# Patient Record
Sex: FEMALE | Race: BLACK OR AFRICAN AMERICAN | NOT HISPANIC OR LATINO | Employment: UNEMPLOYED | URBAN - METROPOLITAN AREA
[De-identification: names, ages, dates, MRNs, and addresses within clinical notes are randomized per-mention and may not be internally consistent; named-entity substitution may affect disease eponyms.]

---

## 2020-06-12 ENCOUNTER — ANESTHESIA EVENT (OUTPATIENT)
Dept: SURGERY | Facility: CLINIC | Age: 40
End: 2020-06-12
Payer: MEDICAID

## 2020-06-12 ENCOUNTER — APPOINTMENT (OUTPATIENT)
Dept: ULTRASOUND IMAGING | Facility: CLINIC | Age: 40
End: 2020-06-12
Attending: EMERGENCY MEDICINE
Payer: MEDICAID

## 2020-06-12 ENCOUNTER — HOSPITAL ENCOUNTER (OUTPATIENT)
Facility: CLINIC | Age: 40
Discharge: HOME | End: 2020-06-13
Attending: EMERGENCY MEDICINE | Admitting: OBSTETRICS & GYNECOLOGY
Payer: MEDICAID

## 2020-06-12 ENCOUNTER — ANESTHESIA (OUTPATIENT)
Dept: SURGERY | Facility: CLINIC | Age: 40
End: 2020-06-12
Payer: MEDICAID

## 2020-06-12 ENCOUNTER — APPOINTMENT (OUTPATIENT)
Dept: GENERAL RADIOLOGY | Facility: CLINIC | Age: 40
End: 2020-06-12
Attending: EMERGENCY MEDICINE
Payer: MEDICAID

## 2020-06-12 DIAGNOSIS — N83.8 OVARIAN MASS: ICD-10-CM

## 2020-06-12 DIAGNOSIS — Z98.890 POST-OPERATIVE STATE: Primary | ICD-10-CM

## 2020-06-12 DIAGNOSIS — N94.89 ADNEXAL MASS: ICD-10-CM

## 2020-06-12 LAB
ALBUMIN SERPL-MCNC: 3.6 G/DL (ref 3.4–5)
ALBUMIN UR-MCNC: 10 MG/DL
ALP SERPL-CCNC: 56 U/L (ref 40–150)
ALT SERPL W P-5'-P-CCNC: 21 U/L (ref 0–50)
ANION GAP SERPL CALCULATED.3IONS-SCNC: 7 MMOL/L (ref 3–14)
APPEARANCE UR: CLEAR
AST SERPL W P-5'-P-CCNC: 20 U/L (ref 0–45)
B-HCG FREE SERPL-ACNC: <5 IU/L
BASOPHILS # BLD AUTO: 0 10E9/L (ref 0–0.2)
BASOPHILS NFR BLD AUTO: 0.3 %
BILIRUB SERPL-MCNC: 0.6 MG/DL (ref 0.2–1.3)
BILIRUB UR QL STRIP: NEGATIVE
BUN SERPL-MCNC: 8 MG/DL (ref 7–30)
CALCIUM SERPL-MCNC: 8.9 MG/DL (ref 8.5–10.1)
CHLORIDE SERPL-SCNC: 109 MMOL/L (ref 94–109)
CO2 SERPL-SCNC: 25 MMOL/L (ref 20–32)
COLOR UR AUTO: YELLOW
CREAT SERPL-MCNC: 0.76 MG/DL (ref 0.52–1.04)
DIFFERENTIAL METHOD BLD: NORMAL
EOSINOPHIL # BLD AUTO: 0 10E9/L (ref 0–0.7)
EOSINOPHIL NFR BLD AUTO: 0.8 %
ERYTHROCYTE [DISTWIDTH] IN BLOOD BY AUTOMATED COUNT: 14.1 % (ref 10–15)
GFR SERPL CREATININE-BSD FRML MDRD: >90 ML/MIN/{1.73_M2}
GLUCOSE SERPL-MCNC: 83 MG/DL (ref 70–99)
GLUCOSE UR STRIP-MCNC: NEGATIVE MG/DL
HCT VFR BLD AUTO: 36.6 % (ref 35–47)
HGB BLD-MCNC: 12.2 G/DL (ref 11.7–15.7)
HGB UR QL STRIP: ABNORMAL
IMM GRANULOCYTES # BLD: 0 10E9/L (ref 0–0.4)
IMM GRANULOCYTES NFR BLD: 0.3 %
KETONES UR STRIP-MCNC: 10 MG/DL
LEUKOCYTE ESTERASE UR QL STRIP: ABNORMAL
LYMPHOCYTES # BLD AUTO: 1.5 10E9/L (ref 0.8–5.3)
LYMPHOCYTES NFR BLD AUTO: 37.7 %
MCH RBC QN AUTO: 27.1 PG (ref 26.5–33)
MCHC RBC AUTO-ENTMCNC: 33.3 G/DL (ref 31.5–36.5)
MCV RBC AUTO: 81 FL (ref 78–100)
MONOCYTES # BLD AUTO: 0.3 10E9/L (ref 0–1.3)
MONOCYTES NFR BLD AUTO: 7.3 %
MUCOUS THREADS #/AREA URNS LPF: PRESENT /LPF
NEUTROPHILS # BLD AUTO: 2.1 10E9/L (ref 1.6–8.3)
NEUTROPHILS NFR BLD AUTO: 53.6 %
NITRATE UR QL: NEGATIVE
NRBC # BLD AUTO: 0 10*3/UL
NRBC BLD AUTO-RTO: 0 /100
PH UR STRIP: 5 PH (ref 5–7)
PLATELET # BLD AUTO: 260 10E9/L (ref 150–450)
POTASSIUM SERPL-SCNC: 3.6 MMOL/L (ref 3.4–5.3)
PROT SERPL-MCNC: 7.3 G/DL (ref 6.8–8.8)
RBC # BLD AUTO: 4.5 10E12/L (ref 3.8–5.2)
RBC #/AREA URNS AUTO: 76 /HPF (ref 0–2)
SARS-COV-2 PCR COMMENT: NORMAL
SARS-COV-2 RNA SPEC QL NAA+PROBE: NEGATIVE
SARS-COV-2 RNA SPEC QL NAA+PROBE: NORMAL
SODIUM SERPL-SCNC: 141 MMOL/L (ref 133–144)
SOURCE: ABNORMAL
SP GR UR STRIP: 1.02 (ref 1–1.03)
SPECIMEN SOURCE: NORMAL
SPECIMEN SOURCE: NORMAL
SQUAMOUS #/AREA URNS AUTO: 2 /HPF (ref 0–1)
UROBILINOGEN UR STRIP-MCNC: NORMAL MG/DL (ref 0–2)
WBC # BLD AUTO: 4 10E9/L (ref 4–11)
WBC #/AREA URNS AUTO: 5 /HPF (ref 0–5)

## 2020-06-12 PROCEDURE — 74018 RADEX ABDOMEN 1 VIEW: CPT

## 2020-06-12 PROCEDURE — 25000125 ZZHC RX 250: Performed by: NURSE ANESTHETIST, CERTIFIED REGISTERED

## 2020-06-12 PROCEDURE — 99285 EMERGENCY DEPT VISIT HI MDM: CPT | Mod: 25

## 2020-06-12 PROCEDURE — 27210794 ZZH OR GENERAL SUPPLY STERILE: Performed by: OBSTETRICS & GYNECOLOGY

## 2020-06-12 PROCEDURE — 25000128 H RX IP 250 OP 636: Performed by: ANESTHESIOLOGY

## 2020-06-12 PROCEDURE — 36000058 ZZH SURGERY LEVEL 3 EA 15 ADDTL MIN: Performed by: OBSTETRICS & GYNECOLOGY

## 2020-06-12 PROCEDURE — 96361 HYDRATE IV INFUSION ADD-ON: CPT | Mod: 59

## 2020-06-12 PROCEDURE — 36000056 ZZH SURGERY LEVEL 3 1ST 30 MIN: Performed by: OBSTETRICS & GYNECOLOGY

## 2020-06-12 PROCEDURE — 88313 SPECIAL STAINS GROUP 2: CPT | Mod: 26 | Performed by: OBSTETRICS & GYNECOLOGY

## 2020-06-12 PROCEDURE — 88313 SPECIAL STAINS GROUP 2: CPT | Performed by: OBSTETRICS & GYNECOLOGY

## 2020-06-12 PROCEDURE — 25800030 ZZH RX IP 258 OP 636: Performed by: ANESTHESIOLOGY

## 2020-06-12 PROCEDURE — 25000128 H RX IP 250 OP 636: Performed by: NURSE ANESTHETIST, CERTIFIED REGISTERED

## 2020-06-12 PROCEDURE — 96374 THER/PROPH/DIAG INJ IV PUSH: CPT

## 2020-06-12 PROCEDURE — 81001 URINALYSIS AUTO W/SCOPE: CPT | Performed by: EMERGENCY MEDICINE

## 2020-06-12 PROCEDURE — 84702 CHORIONIC GONADOTROPIN TEST: CPT

## 2020-06-12 PROCEDURE — 25000128 H RX IP 250 OP 636: Performed by: EMERGENCY MEDICINE

## 2020-06-12 PROCEDURE — 88341 IMHCHEM/IMCYTCHM EA ADD ANTB: CPT | Mod: 26 | Performed by: OBSTETRICS & GYNECOLOGY

## 2020-06-12 PROCEDURE — 37000008 ZZH ANESTHESIA TECHNICAL FEE, 1ST 30 MIN: Performed by: OBSTETRICS & GYNECOLOGY

## 2020-06-12 PROCEDURE — 76830 TRANSVAGINAL US NON-OB: CPT

## 2020-06-12 PROCEDURE — 40000170 ZZH STATISTIC PRE-PROCEDURE ASSESSMENT II: Performed by: OBSTETRICS & GYNECOLOGY

## 2020-06-12 PROCEDURE — 88305 TISSUE EXAM BY PATHOLOGIST: CPT | Performed by: OBSTETRICS & GYNECOLOGY

## 2020-06-12 PROCEDURE — C9803 HOPD COVID-19 SPEC COLLECT: HCPCS

## 2020-06-12 PROCEDURE — 25000566 ZZH SEVOFLURANE, EA 15 MIN: Performed by: OBSTETRICS & GYNECOLOGY

## 2020-06-12 PROCEDURE — 71000012 ZZH RECOVERY PHASE 1 LEVEL 1 FIRST HR: Performed by: OBSTETRICS & GYNECOLOGY

## 2020-06-12 PROCEDURE — 80053 COMPREHEN METABOLIC PANEL: CPT | Performed by: EMERGENCY MEDICINE

## 2020-06-12 PROCEDURE — 71000013 ZZH RECOVERY PHASE 1 LEVEL 1 EA ADDTL HR: Performed by: OBSTETRICS & GYNECOLOGY

## 2020-06-12 PROCEDURE — 37000009 ZZH ANESTHESIA TECHNICAL FEE, EACH ADDTL 15 MIN: Performed by: OBSTETRICS & GYNECOLOGY

## 2020-06-12 PROCEDURE — 25800030 ZZH RX IP 258 OP 636: Performed by: EMERGENCY MEDICINE

## 2020-06-12 PROCEDURE — 85025 COMPLETE CBC W/AUTO DIFF WBC: CPT | Performed by: EMERGENCY MEDICINE

## 2020-06-12 PROCEDURE — 88305 TISSUE EXAM BY PATHOLOGIST: CPT | Mod: 26 | Performed by: OBSTETRICS & GYNECOLOGY

## 2020-06-12 PROCEDURE — 88112 CYTOPATH CELL ENHANCE TECH: CPT | Performed by: OBSTETRICS & GYNECOLOGY

## 2020-06-12 PROCEDURE — U0003 INFECTIOUS AGENT DETECTION BY NUCLEIC ACID (DNA OR RNA); SEVERE ACUTE RESPIRATORY SYNDROME CORONAVIRUS 2 (SARS-COV-2) (CORONAVIRUS DISEASE [COVID-19]), AMPLIFIED PROBE TECHNIQUE, MAKING USE OF HIGH THROUGHPUT TECHNOLOGIES AS DESCRIBED BY CMS-2020-01-R: HCPCS | Performed by: EMERGENCY MEDICINE

## 2020-06-12 PROCEDURE — 88342 IMHCHEM/IMCYTCHM 1ST ANTB: CPT | Mod: 26 | Performed by: OBSTETRICS & GYNECOLOGY

## 2020-06-12 PROCEDURE — 88112 CYTOPATH CELL ENHANCE TECH: CPT | Mod: 26 | Performed by: OBSTETRICS & GYNECOLOGY

## 2020-06-12 PROCEDURE — 25800025 ZZH RX 258: Performed by: OBSTETRICS & GYNECOLOGY

## 2020-06-12 PROCEDURE — 88341 IMHCHEM/IMCYTCHM EA ADD ANTB: CPT | Performed by: OBSTETRICS & GYNECOLOGY

## 2020-06-12 PROCEDURE — 88342 IMHCHEM/IMCYTCHM 1ST ANTB: CPT | Performed by: OBSTETRICS & GYNECOLOGY

## 2020-06-12 PROCEDURE — 00000155 ZZHCL STATISTIC H-CELL BLOCK W/STAIN: Performed by: OBSTETRICS & GYNECOLOGY

## 2020-06-12 PROCEDURE — 25000125 ZZHC RX 250: Performed by: OBSTETRICS & GYNECOLOGY

## 2020-06-12 RX ORDER — NALOXONE HYDROCHLORIDE 0.4 MG/ML
.1-.4 INJECTION, SOLUTION INTRAMUSCULAR; INTRAVENOUS; SUBCUTANEOUS
Status: DISCONTINUED | OUTPATIENT
Start: 2020-06-12 | End: 2020-06-13 | Stop reason: HOSPADM

## 2020-06-12 RX ORDER — LIDOCAINE 40 MG/G
CREAM TOPICAL
Status: DISCONTINUED | OUTPATIENT
Start: 2020-06-12 | End: 2020-06-13 | Stop reason: HOSPADM

## 2020-06-12 RX ORDER — DEXAMETHASONE SODIUM PHOSPHATE 4 MG/ML
INJECTION, SOLUTION INTRA-ARTICULAR; INTRALESIONAL; INTRAMUSCULAR; INTRAVENOUS; SOFT TISSUE PRN
Status: DISCONTINUED | OUTPATIENT
Start: 2020-06-12 | End: 2020-06-12

## 2020-06-12 RX ORDER — GLYCOPYRROLATE 0.2 MG/ML
INJECTION, SOLUTION INTRAMUSCULAR; INTRAVENOUS PRN
Status: DISCONTINUED | OUTPATIENT
Start: 2020-06-12 | End: 2020-06-12

## 2020-06-12 RX ORDER — PROPOFOL 10 MG/ML
INJECTION, EMULSION INTRAVENOUS PRN
Status: DISCONTINUED | OUTPATIENT
Start: 2020-06-12 | End: 2020-06-12

## 2020-06-12 RX ORDER — LIDOCAINE HYDROCHLORIDE 20 MG/ML
INJECTION, SOLUTION INFILTRATION; PERINEURAL PRN
Status: DISCONTINUED | OUTPATIENT
Start: 2020-06-12 | End: 2020-06-12

## 2020-06-12 RX ORDER — HYDROMORPHONE HYDROCHLORIDE 1 MG/ML
.3-.5 INJECTION, SOLUTION INTRAMUSCULAR; INTRAVENOUS; SUBCUTANEOUS EVERY 5 MIN PRN
Status: DISCONTINUED | OUTPATIENT
Start: 2020-06-12 | End: 2020-06-12 | Stop reason: HOSPADM

## 2020-06-12 RX ORDER — DOCUSATE SODIUM 100 MG/1
100 CAPSULE, LIQUID FILLED ORAL 2 TIMES DAILY
Qty: 60 CAPSULE | Refills: 0 | Status: SHIPPED | OUTPATIENT
Start: 2020-06-12 | End: 2020-07-24

## 2020-06-12 RX ORDER — HYDROCODONE BITARTRATE AND ACETAMINOPHEN 5; 325 MG/1; MG/1
1 TABLET ORAL EVERY 6 HOURS PRN
Qty: 10 TABLET | Refills: 0 | Status: ON HOLD | OUTPATIENT
Start: 2020-06-12 | End: 2020-07-27

## 2020-06-12 RX ORDER — MEPERIDINE HYDROCHLORIDE 25 MG/ML
12.5 INJECTION INTRAMUSCULAR; INTRAVENOUS; SUBCUTANEOUS EVERY 5 MIN PRN
Status: DISCONTINUED | OUTPATIENT
Start: 2020-06-12 | End: 2020-06-12 | Stop reason: HOSPADM

## 2020-06-12 RX ORDER — PROPOFOL 10 MG/ML
INJECTION, EMULSION INTRAVENOUS CONTINUOUS PRN
Status: DISCONTINUED | OUTPATIENT
Start: 2020-06-12 | End: 2020-06-12

## 2020-06-12 RX ORDER — KETOROLAC TROMETHAMINE 10 MG/1
10 TABLET, FILM COATED ORAL EVERY 6 HOURS PRN
Qty: 20 TABLET | Refills: 0 | Status: SHIPPED | OUTPATIENT
Start: 2020-06-12 | End: 2021-04-15

## 2020-06-12 RX ORDER — ALBUTEROL SULFATE 0.83 MG/ML
2.5 SOLUTION RESPIRATORY (INHALATION) EVERY 4 HOURS PRN
Status: DISCONTINUED | OUTPATIENT
Start: 2020-06-12 | End: 2020-06-12 | Stop reason: HOSPADM

## 2020-06-12 RX ORDER — NEOSTIGMINE METHYLSULFATE 1 MG/ML
VIAL (ML) INJECTION PRN
Status: DISCONTINUED | OUTPATIENT
Start: 2020-06-12 | End: 2020-06-12

## 2020-06-12 RX ORDER — METOCLOPRAMIDE HYDROCHLORIDE 5 MG/ML
10 INJECTION INTRAMUSCULAR; INTRAVENOUS EVERY 6 HOURS PRN
Status: DISCONTINUED | OUTPATIENT
Start: 2020-06-12 | End: 2020-06-13 | Stop reason: HOSPADM

## 2020-06-12 RX ORDER — BUPIVACAINE HYDROCHLORIDE AND EPINEPHRINE 2.5; 5 MG/ML; UG/ML
INJECTION, SOLUTION EPIDURAL; INFILTRATION; INTRACAUDAL; PERINEURAL PRN
Status: DISCONTINUED | OUTPATIENT
Start: 2020-06-12 | End: 2020-06-12 | Stop reason: HOSPADM

## 2020-06-12 RX ORDER — SODIUM CHLORIDE, SODIUM LACTATE, POTASSIUM CHLORIDE, CALCIUM CHLORIDE 600; 310; 30; 20 MG/100ML; MG/100ML; MG/100ML; MG/100ML
INJECTION, SOLUTION INTRAVENOUS CONTINUOUS
Status: DISCONTINUED | OUTPATIENT
Start: 2020-06-12 | End: 2020-06-12 | Stop reason: HOSPADM

## 2020-06-12 RX ORDER — ONDANSETRON 2 MG/ML
4 INJECTION INTRAMUSCULAR; INTRAVENOUS EVERY 6 HOURS PRN
Status: DISCONTINUED | OUTPATIENT
Start: 2020-06-12 | End: 2020-06-13 | Stop reason: HOSPADM

## 2020-06-12 RX ORDER — NALOXONE HYDROCHLORIDE 0.4 MG/ML
INJECTION, SOLUTION INTRAMUSCULAR; INTRAVENOUS; SUBCUTANEOUS PRN
Status: DISCONTINUED | OUTPATIENT
Start: 2020-06-12 | End: 2020-06-12

## 2020-06-12 RX ORDER — MAGNESIUM HYDROXIDE 1200 MG/15ML
LIQUID ORAL PRN
Status: DISCONTINUED | OUTPATIENT
Start: 2020-06-12 | End: 2020-06-12 | Stop reason: HOSPADM

## 2020-06-12 RX ORDER — HYDROCODONE BITARTRATE AND ACETAMINOPHEN 5; 325 MG/1; MG/1
1 TABLET ORAL
Status: DISCONTINUED | OUTPATIENT
Start: 2020-06-12 | End: 2020-06-13 | Stop reason: HOSPADM

## 2020-06-12 RX ORDER — METOCLOPRAMIDE 5 MG/1
10 TABLET ORAL EVERY 6 HOURS PRN
Status: DISCONTINUED | OUTPATIENT
Start: 2020-06-12 | End: 2020-06-13 | Stop reason: HOSPADM

## 2020-06-12 RX ORDER — ACETAMINOPHEN 325 MG/1
650 TABLET ORAL EVERY 6 HOURS PRN
Status: DISCONTINUED | OUTPATIENT
Start: 2020-06-12 | End: 2020-06-13 | Stop reason: HOSPADM

## 2020-06-12 RX ORDER — ONDANSETRON 4 MG/1
4 TABLET, ORALLY DISINTEGRATING ORAL EVERY 30 MIN PRN
Status: DISCONTINUED | OUTPATIENT
Start: 2020-06-12 | End: 2020-06-12 | Stop reason: HOSPADM

## 2020-06-12 RX ORDER — ONDANSETRON 4 MG/1
4 TABLET, ORALLY DISINTEGRATING ORAL EVERY 6 HOURS PRN
Status: DISCONTINUED | OUTPATIENT
Start: 2020-06-12 | End: 2020-06-13 | Stop reason: HOSPADM

## 2020-06-12 RX ORDER — KETOROLAC TROMETHAMINE 15 MG/ML
15 INJECTION, SOLUTION INTRAMUSCULAR; INTRAVENOUS ONCE
Status: COMPLETED | OUTPATIENT
Start: 2020-06-12 | End: 2020-06-12

## 2020-06-12 RX ORDER — ONDANSETRON 4 MG/1
4 TABLET, ORALLY DISINTEGRATING ORAL
Status: COMPLETED | OUTPATIENT
Start: 2020-06-12 | End: 2020-06-12

## 2020-06-12 RX ORDER — FENTANYL CITRATE 50 UG/ML
INJECTION, SOLUTION INTRAMUSCULAR; INTRAVENOUS PRN
Status: DISCONTINUED | OUTPATIENT
Start: 2020-06-12 | End: 2020-06-12

## 2020-06-12 RX ORDER — IBUPROFEN 600 MG/1
600 TABLET, FILM COATED ORAL ONCE
Status: DISCONTINUED | OUTPATIENT
Start: 2020-06-12 | End: 2020-06-12

## 2020-06-12 RX ORDER — PROCHLORPERAZINE MALEATE 10 MG
10 TABLET ORAL EVERY 6 HOURS PRN
Status: DISCONTINUED | OUTPATIENT
Start: 2020-06-12 | End: 2020-06-13 | Stop reason: HOSPADM

## 2020-06-12 RX ORDER — ONDANSETRON 2 MG/ML
INJECTION INTRAMUSCULAR; INTRAVENOUS PRN
Status: DISCONTINUED | OUTPATIENT
Start: 2020-06-12 | End: 2020-06-12

## 2020-06-12 RX ORDER — FAMOTIDINE 20 MG/1
20 TABLET, FILM COATED ORAL 2 TIMES DAILY
Status: DISCONTINUED | OUTPATIENT
Start: 2020-06-13 | End: 2020-06-13 | Stop reason: HOSPADM

## 2020-06-12 RX ORDER — HYDROMORPHONE HYDROCHLORIDE 1 MG/ML
0.2 INJECTION, SOLUTION INTRAMUSCULAR; INTRAVENOUS; SUBCUTANEOUS
Status: DISCONTINUED | OUTPATIENT
Start: 2020-06-12 | End: 2020-06-13 | Stop reason: HOSPADM

## 2020-06-12 RX ORDER — KETOROLAC TROMETHAMINE 30 MG/ML
30 INJECTION, SOLUTION INTRAMUSCULAR; INTRAVENOUS EVERY 6 HOURS
Status: DISCONTINUED | OUTPATIENT
Start: 2020-06-13 | End: 2020-06-13 | Stop reason: HOSPADM

## 2020-06-12 RX ORDER — FENTANYL CITRATE 50 UG/ML
25-50 INJECTION, SOLUTION INTRAMUSCULAR; INTRAVENOUS EVERY 5 MIN PRN
Status: DISCONTINUED | OUTPATIENT
Start: 2020-06-12 | End: 2020-06-12 | Stop reason: HOSPADM

## 2020-06-12 RX ORDER — ONDANSETRON 2 MG/ML
4 INJECTION INTRAMUSCULAR; INTRAVENOUS EVERY 30 MIN PRN
Status: DISCONTINUED | OUTPATIENT
Start: 2020-06-12 | End: 2020-06-12 | Stop reason: HOSPADM

## 2020-06-12 RX ORDER — KETOROLAC TROMETHAMINE 30 MG/ML
INJECTION, SOLUTION INTRAMUSCULAR; INTRAVENOUS PRN
Status: DISCONTINUED | OUTPATIENT
Start: 2020-06-12 | End: 2020-06-12

## 2020-06-12 RX ADMIN — ONDANSETRON 4 MG: 2 INJECTION INTRAMUSCULAR; INTRAVENOUS at 22:19

## 2020-06-12 RX ADMIN — SODIUM CHLORIDE 1000 ML: 9 INJECTION, SOLUTION INTRAVENOUS at 11:13

## 2020-06-12 RX ADMIN — PROPOFOL 150 MCG/KG/MIN: 10 INJECTION, EMULSION INTRAVENOUS at 18:03

## 2020-06-12 RX ADMIN — SODIUM CHLORIDE, POTASSIUM CHLORIDE, SODIUM LACTATE AND CALCIUM CHLORIDE: 600; 310; 30; 20 INJECTION, SOLUTION INTRAVENOUS at 17:15

## 2020-06-12 RX ADMIN — MIDAZOLAM 2 MG: 1 INJECTION INTRAMUSCULAR; INTRAVENOUS at 17:55

## 2020-06-12 RX ADMIN — PROPOFOL 50 MG: 10 INJECTION, EMULSION INTRAVENOUS at 19:08

## 2020-06-12 RX ADMIN — NEOSTIGMINE METHYLSULFATE 3.5 MG: 1 INJECTION, SOLUTION INTRAVENOUS at 20:20

## 2020-06-12 RX ADMIN — KETOROLAC TROMETHAMINE 15 MG: 30 INJECTION, SOLUTION INTRAMUSCULAR at 20:08

## 2020-06-12 RX ADMIN — HYDROMORPHONE HYDROCHLORIDE 0.5 MG: 1 INJECTION, SOLUTION INTRAMUSCULAR; INTRAVENOUS; SUBCUTANEOUS at 19:12

## 2020-06-12 RX ADMIN — KETOROLAC TROMETHAMINE 15 MG: 15 INJECTION, SOLUTION INTRAMUSCULAR; INTRAVENOUS at 12:46

## 2020-06-12 RX ADMIN — ONDANSETRON 4 MG: 4 TABLET, ORALLY DISINTEGRATING ORAL at 10:46

## 2020-06-12 RX ADMIN — GLYCOPYRROLATE 0.4 MG: 0.2 INJECTION, SOLUTION INTRAMUSCULAR; INTRAVENOUS at 20:20

## 2020-06-12 RX ADMIN — SODIUM CHLORIDE, POTASSIUM CHLORIDE, SODIUM LACTATE AND CALCIUM CHLORIDE: 600; 310; 30; 20 INJECTION, SOLUTION INTRAVENOUS at 17:55

## 2020-06-12 RX ADMIN — FENTANYL CITRATE 50 MCG: 50 INJECTION, SOLUTION INTRAMUSCULAR; INTRAVENOUS at 18:26

## 2020-06-12 RX ADMIN — FENTANYL CITRATE 100 MCG: 50 INJECTION, SOLUTION INTRAMUSCULAR; INTRAVENOUS at 18:03

## 2020-06-12 RX ADMIN — DEXAMETHASONE SODIUM PHOSPHATE 4 MG: 4 INJECTION, SOLUTION INTRA-ARTICULAR; INTRALESIONAL; INTRAMUSCULAR; INTRAVENOUS; SOFT TISSUE at 18:03

## 2020-06-12 RX ADMIN — HYDROMORPHONE HYDROCHLORIDE 0.5 MG: 1 INJECTION, SOLUTION INTRAMUSCULAR; INTRAVENOUS; SUBCUTANEOUS at 18:49

## 2020-06-12 RX ADMIN — NALOXONE HYDROCHLORIDE 40 MCG: 0.4 INJECTION, SOLUTION INTRAMUSCULAR; INTRAVENOUS; SUBCUTANEOUS at 20:45

## 2020-06-12 RX ADMIN — LIDOCAINE HYDROCHLORIDE 100 MG: 20 INJECTION, SOLUTION INFILTRATION; PERINEURAL at 18:03

## 2020-06-12 RX ADMIN — ONDANSETRON 4 MG: 2 INJECTION INTRAMUSCULAR; INTRAVENOUS at 18:03

## 2020-06-12 RX ADMIN — SODIUM CHLORIDE, POTASSIUM CHLORIDE, SODIUM LACTATE AND CALCIUM CHLORIDE: 600; 310; 30; 20 INJECTION, SOLUTION INTRAVENOUS at 18:35

## 2020-06-12 RX ADMIN — ROCURONIUM BROMIDE 50 MG: 10 INJECTION INTRAVENOUS at 18:03

## 2020-06-12 RX ADMIN — FENTANYL CITRATE 50 MCG: 50 INJECTION, SOLUTION INTRAMUSCULAR; INTRAVENOUS at 18:42

## 2020-06-12 RX ADMIN — PROPOFOL 200 MG: 10 INJECTION, EMULSION INTRAVENOUS at 18:03

## 2020-06-12 RX ADMIN — ROCURONIUM BROMIDE 10 MG: 10 INJECTION INTRAVENOUS at 18:35

## 2020-06-12 ASSESSMENT — MIFFLIN-ST. JEOR: SCORE: 1338.6

## 2020-06-12 ASSESSMENT — ENCOUNTER SYMPTOMS
FEVER: 0
DYSURIA: 0
BACK PAIN: 1
CHILLS: 1
NAUSEA: 1
FREQUENCY: 0
VOMITING: 0
ABDOMINAL PAIN: 1
DIARRHEA: 0

## 2020-06-12 NOTE — ANESTHESIA PREPROCEDURE EVALUATION
Anesthesia Pre-Procedure Evaluation    Patient: Jefe Blanc   MRN: [id number] : 1980          Preoperative Diagnosis: Pelvic pain [R10.2]    Procedure(s):  Diagnostic laparoscopy, possible ovarian torsion, bilateral ovarian cysts.    Past Medical History:   Diagnosis Date     Diverticulitis     of esophagus     Ovarian cyst      Past Surgical History:   Procedure Laterality Date     ADENOIDECTOMY       ENT SURGERY      tubes     post partum tubal ligation         Anesthesia Evaluation     .             ROS/MED HX    ENT/Pulmonary:  - neg pulmonary ROS    (-) sleep apnea   Neurologic:  - neg neurologic ROS     Cardiovascular:  - neg cardiovascular ROS       METS/Exercise Tolerance:     Hematologic:         Musculoskeletal:         GI/Hepatic:  - neg GI/hepatic ROS      (-) GERD   Renal/Genitourinary:  - ROS Renal section negative       Endo:  - neg endo ROS       Psychiatric:         Infectious Disease:         Malignancy:         Other: Comment: Ovarian torsion                         Physical Exam  Normal systems: dental    Airway   Mallampati: II  TM distance: >3 FB  Neck ROM: full    Dental     Cardiovascular   Rhythm and rate: regular      Pulmonary    breath sounds clear to auscultation            Lab Results   Component Value Date    WBC 4.0 2020    HGB 12.2 2020    HCT 36.6 2020     2020     2020    POTASSIUM 3.6 2020    CHLORIDE 109 2020    CO2 25 2020    BUN 8 2020    CR 0.76 2020    GLC 83 2020    IRON 8.9 2020    ALBUMIN 3.6 2020    PROTTOTAL 7.3 2020    ALT 21 2020    AST 20 2020    ALKPHOS 56 2020    BILITOTAL 0.6 2020       Preop Vitals  BP Readings from Last 3 Encounters:   20 (!) 131/99    Pulse Readings from Last 3 Encounters:   20 64      Resp Readings from Last 3 Encounters:   20 14    SpO2 Readings from Last 3 Encounters:   20 100%      Temp  "Readings from Last 1 Encounters:   06/12/20 36.7  C (98  F) (Oral)    Ht Readings from Last 1 Encounters:   06/12/20 1.6 m (5' 3\")      Wt Readings from Last 1 Encounters:   06/12/20 68.9 kg (152 lb)    Estimated body mass index is 26.93 kg/m  as calculated from the following:    Height as of this encounter: 1.6 m (5' 3\").    Weight as of this encounter: 68.9 kg (152 lb).       Anesthesia Plan      History & Physical Review  History and physical reviewed and following examination; no interval change.    ASA Status:  2 .    NPO Status:  > 8 hours    Plan for General (ETT) with Intravenous and Propofol induction. Maintenance will be Balanced.    PONV prophylaxis:  Ondansetron (or other 5HT-3) and Dexamethasone or Solumedrol  Low dose propofol infusion for PONV prophylaxis (20-30 mcg/kg/min)          Postoperative Care  Postoperative pain management:  Multi-modal analgesia.      Consents  Anesthetic plan, risks, benefits and alternatives discussed with:  Patient..                 Chavez Patel MD  "

## 2020-06-12 NOTE — ED PROVIDER NOTES
History     Chief Complaint:  Abdominal Pain    HPI   Jefe Blanc is a 40 year old female with a previous history of ovarian cysts who presents with abdominal pain. The patient reports that since yesterday she has been experiencing left lower abdominal pain that is describes as a cramping sensation. She has associated nausea when there is pain, but notes that this is typical for her with pain. The patient also has some radiating pain to her back but is unsure if this related to her being busy at work. She stated that the pain is similar to her menstrual cycles. She has urgency to urinate which has been occurring for the past 6 months, but there are no other urinary symptoms including dysuria or frequency. The reports she does get the chills and is cold and this is believed to be due to her low iron. She denies a fever, diarrhea and vomiting. She states that she still has her appendix. In October she had an ovarian cyst that ruptured. The patient has had an IUD in place since February.     Allergies:  Azithromyin     Medications:    No current medications.     Past Medical History:    No known past medical history.    Past Surgical History:    No know past surgical history.    Family History:    No family history on file.    Social History:  The patient presented alone.  Smoking Status: Never  Smokeless Tobacco: Never Used  Alcohol Use: Positive   Drug Use: Negative    Review of Systems   Constitutional: Positive for chills. Negative for fever.   Gastrointestinal: Positive for abdominal pain and nausea. Negative for diarrhea and vomiting.   Genitourinary: Positive for urgency. Negative for dysuria and frequency.   Musculoskeletal: Positive for back pain.   10 point review of systems performed and is negative except as above and in HPI.  Physical Exam     Patient Vitals for the past 24 hrs:   BP Temp Temp src Pulse Heart Rate Resp SpO2 Height Weight   06/12/20 1530 -- -- -- -- -- -- 100 % -- --   06/12/20 1520  "118/87 -- -- 64 -- -- -- -- --   06/12/20 1415 (!) 149/110 -- -- 65 -- -- 100 % -- --   06/12/20 1036 (!) 143/95 98  F (36.7  C) Oral -- 77 16 98 % 1.6 m (5' 3\") 68.9 kg (152 lb)      Physical Exam  General: Resting on the gurney, appears uncomfortable  Head:  The scalp, face, and head appear normal  Mouth/Throat: Mucus membranes are moist  CV:  Regular rate    Normal S1 and S2  No pathological murmur   Resp:  Breath sounds clear and equal bilaterally    Non-labored, no retractions or accessory muscle use    No coarseness    No wheezing   GI:  Abdomen is soft, no rigidity    Diffuse lower abdominal tenderness to palpation right worse than left    No voluntary guarding     No rebound  MS:  Normal motor assessment of all extremities.    Good capillary refill noted.     Skin:  No rash or lesions noted.  Neuro:   Speech is normal and fluent. No apparent deficit.  Psych: Awake. Alert.  Normal affect.      Appropriate interactions.     Emergency Department Course     Imaging:  Radiology findings were communicated with the patient who voiced understanding of the findings.    Abdomen XR 1 vw  Intrauterine device projects centrally within the pelvis.  Minimal amount of stool. Nonobstructed bowel gas pattern.  MEL HOLDEN MD  Reading per radiology    US Pelvis Cmplt w Transvag & Doppler LmtPel Duplex Limited  1.  Negative for ovarian torsion.    2.  There are bilateral ovarian masses. The largest measures 6.8 cm on  the left, and the largest on the right measures 4.0 cm. These are  indeterminate in nature. They may represent endometriomas, hemorrhagic  cysts, or mature cystic teratomas, despite the lack of a visualized  cystic component or ectodermal elements. Please consider outpatient  gynecologic surgical consultation and consideration of further  evaluation with MRI.    3.  IUD present but not well seen. Although it appears in good  position, the integrity of the IUD is not confirmed, and can be  assessed by plain " pelvic radiograph.    4.  There are a couple of fibroids and uterus. In addition, the  myometrium is heterogeneous and the endometrium is ill-defined.  Underlying adenomyosis is not excluded.    Discussed with Genesis Chapman by Dr. Perez via telephone at 1313 on  6/12/2020.  KAMALJIT PEREZ MD  Reading per radiology     Laboratory:  Laboratory findings were communicated with the patient who voiced understanding of the findings.    Asymptomatic COVID-19 Virus (Coronavirus) by PCR Nasopharyngeal swab: Pending     UA: [place] 10 (A), Blood moderate (A), Protein Albumin 10 (A), Leukocyte Esterase trace (A), RBC 76 (H), Squamous Epithelial 2 (H), Mucous present (A), o/w WNL.    CBC: WBC 4.0, HGB 12.2,   CMP: WNL (Creatinine 0.76)    ISTAT HCG Quantitative: <5.0     Interventions:  1046 Zofran 4 mg PO  1113 NS 1000 mL IV  1246 Toradol 15 mg IV      Emergency Department Course:    1040 Nursing notes and vitals reviewed. I performed an exam of the patient as documented above.     1055 IV was inserted and blood was drawn for laboratory testing, results above.     1149 The patient was sent for a US while in the emergency department, results above.      1240 The patient provided a urine sample here in the emergency department. This was sent for laboratory testing, findings above.     1313 I spoke with Dr. Perez of the radiology service regarding patient's presentation, findings, and plan of care.     1411 I spoke with Dr. Rocha of the OB/GYN service regarding patient's presentation, findings, and plan of care.     1416 The patient was sent for a XR while in the emergency department, results above.      1536 I spoke with OB/GYN post assessment. A COVID-19 nasal swab sample was obtained for laboratory testing as documented above.      Prior to transfer to OR, I personally reviewed the results with the patient and all related questions were answered. The patient verbalized understanding and is amenable to plan.      Impression & Plan      Medical Decision Making:  Jefe Blanc is a 40 year old female who presents with pelvic pain.  They look overall well.  A broad differential diagnosis was considered including colitis, appendicitis, intestinal cramping, pyelonephritis, UTI, kidney stone, constipation, diverticulitis, volvulus, ileus, obstruction, pregnancy (ectopic or intrauterine), ovarian cyst (enlarged or ruptured), ovarian torsion, PID, etc as possibilities.    The workup in the ED shows likely ovarian cyst as source of pain, however there is a large concern for ovarian torsion therefore I consulted with OB/GYN who is planning to bring her to the OR. OB/GYN evaluated the patient in the ER after imaging obtained, they discussed the plan to go to the OR with the patient and she agreed.  Patient is hemodynamically stable in ED. The patient is in agreement with the plan and is stable to transfer to the OR.    Covid-19  Jefe Blanc was evaluated during a global COVID-19 pandemic. Applicable protocols for evaluation were followed during the patient's care. COVID-19 was considered as part of the patient's evaluation.   The plan for testing is:  An asymptomatic test was obtained during this visit prior to transfer to OR.    Diagnosis:    ICD-10-CM    1. Ovarian mass  N83.8 Asymptomatic COVID-19 Virus (Coronavirus) by PCR     Disposition:   The patient was sent to the OR under the care of OB/GYN.     Scribe Disclosure:  I, Orla Severson, am serving as a scribe at 11:00 AM on 6/12/2020 to document services personally performed by Genesis Alanis MD based on my observations and the provider's statements to me.    EMERGENCY DEPARTMENT       Genesis Alanis MD  06/12/20 [id number]

## 2020-06-12 NOTE — ED NOTES
Report received. Patient visualized. Vital signs stable. COVID swab obtained and sent. OBGYN at bedside. Will continue to monitor

## 2020-06-12 NOTE — CONSULTS
OB/GYN Consult  Jefe Blanc   1980  MRN [id number]    CC: pelvic pain     Consultation requested by Dr. Alanis     HPI: Jefe Blanc is a 40 year old  who presents with pelvic pain. She notes that she has had pain for the last several months since september, intermittent in nature, became more severe and constant today. She notes she came in due to pain being an 8/10 that was cramping feeling in nature. She notes that she was evaluated for this pain 1 time previously in 10/2019 at [facility]. She notes that was the time she was dx with ovarian cysts. She notes heat and ibuprofen help with pain. She got some toradol and notes some decrease in pain.     She notes nausea today with the pain, no vomiting. She last ate at 6:30 am a mini muffin as well as some water.     She previously saw [facility] who does not come to [facility]. She last saw them 1 year ago when her IUD was placed. She has had irregular periods since IUD. She notes that prior to the IUD, she had heavy periods with clots and severe pain with cramping.  Since the IUD, she notes that periods are much lighter, but abnormal.     She finished her period 1 week ago and then has had some bleeding again today.     Last BM was this am and was softer than usual, but formed.     Gyn Hx:  Contraception: IUD , tubal ligation   LMP: Patient's last menstrual period was 2020.  OB Hx: ,  5 , 1 EAB   Pap smears: no abnormals per patient     Past Medical History:   Diagnosis Date     Diverticulitis     of esophagus     Ovarian cyst     denies bleeding/clotting disorders. Denies HTN    Past Surgical History:   Procedure Laterality Date     ADENOIDECTOMY       ENT SURGERY      tubes     post partum tubal ligation          No current facility-administered medications on file prior to encounter.   No current outpatient medications on file prior to encounter.       Allergies   Allergen Reactions     Azithromycin         Social  "History     Socioeconomic History     Marital status: Single     Spouse name: Not on file     Number of children: Not on file     Years of education: Not on file     Highest education level: Not on file   Occupational History     Not on file   Social Needs     Financial resource strain: Not on file     Food insecurity     Worry: Not on file     Inability: Not on file     Transportation needs     Medical: Not on file     Non-medical: Not on file   Tobacco Use     Smoking status: Never Smoker     Smokeless tobacco: Never Used   Substance and Sexual Activity     Alcohol use: Yes     Comment: rare     Drug use: Never     Sexual activity: Not on file   Lifestyle     Physical activity     Days per week: Not on file     Minutes per session: Not on file     Stress: Not on file   Relationships     Social connections     Talks on phone: Not on file     Gets together: Not on file     Attends Church service: Not on file     Active member of club or organization: Not on file     Attends meetings of clubs or organizations: Not on file     Relationship status: Not on file     Intimate partner violence     Fear of current or ex partner: Not on file     Emotionally abused: Not on file     Physically abused: Not on file     Forced sexual activity: Not on file   Other Topics Concern     Not on file   Social History Narrative     Not on file        Objective:  Vitals:    06/12/20 1036 06/12/20 1415 06/12/20 1520 06/12/20 1530   BP: (!) 143/95 (!) 149/110 118/87    Pulse:  65 64    Resp: 16      Temp: 98  F (36.7  C)      TempSrc: Oral      SpO2: 98% 100%  100%   Weight: 68.9 kg (152 lb)      Height: 1.6 m (5' 3\")        Gen: appears uncomfortable, cooperative  Heart: RRR with no murmurs noted  Lungs: CTAB, no wheezes, rubs or rhonchi noted. Good air movement throughout and no labored breathing  Abd: Soft, non distended,  no organomegaly or masses noted. Tender in lower abdomen.     Labs/Imaging:  Results for orders placed or " performed during the hospital encounter of 06/12/20 (from the past 24 hour(s))   CBC with platelets differential   Result Value Ref Range    WBC 4.0 4.0 - 11.0 10e9/L    RBC Count 4.50 3.8 - 5.2 10e12/L    Hemoglobin 12.2 11.7 - 15.7 g/dL    Hematocrit 36.6 35.0 - 47.0 %    MCV 81 78 - 100 fl    MCH 27.1 26.5 - 33.0 pg    MCHC 33.3 31.5 - 36.5 g/dL    RDW 14.1 10.0 - 15.0 %    Platelet Count 260 150 - 450 10e9/L    Diff Method Automated Method     % Neutrophils 53.6 %    % Lymphocytes 37.7 %    % Monocytes 7.3 %    % Eosinophils 0.8 %    % Basophils 0.3 %    % Immature Granulocytes 0.3 %    Nucleated RBCs 0 0 /100    Absolute Neutrophil 2.1 1.6 - 8.3 10e9/L    Absolute Lymphocytes 1.5 0.8 - 5.3 10e9/L    Absolute Monocytes 0.3 0.0 - 1.3 10e9/L    Absolute Eosinophils 0.0 0.0 - 0.7 10e9/L    Absolute Basophils 0.0 0.0 - 0.2 10e9/L    Abs Immature Granulocytes 0.0 0 - 0.4 10e9/L    Absolute Nucleated RBC 0.0    Comprehensive metabolic panel   Result Value Ref Range    Sodium 141 133 - 144 mmol/L    Potassium 3.6 3.4 - 5.3 mmol/L    Chloride 109 94 - 109 mmol/L    Carbon Dioxide 25 20 - 32 mmol/L    Anion Gap 7 3 - 14 mmol/L    Glucose 83 70 - 99 mg/dL    Urea Nitrogen 8 7 - 30 mg/dL    Creatinine 0.76 0.52 - 1.04 mg/dL    GFR Estimate >90 >60 mL/min/[1.73_m2]    GFR Estimate If Black >90 >60 mL/min/[1.73_m2]    Calcium 8.9 8.5 - 10.1 mg/dL    Bilirubin Total 0.6 0.2 - 1.3 mg/dL    Albumin 3.6 3.4 - 5.0 g/dL    Protein Total 7.3 6.8 - 8.8 g/dL    Alkaline Phosphatase 56 40 - 150 U/L    ALT 21 0 - 50 U/L    AST 20 0 - 45 U/L   ISTAT HCG Quantitative Pregnancy POCT   Result Value Ref Range    HCG Quantitative Serum <5.0 <5.0 IU/L   UA with Microscopic   Result Value Ref Range    Color Urine Yellow     Appearance Urine Clear     Glucose Urine Negative NEG^Negative mg/dL    Bilirubin Urine Negative NEG^Negative    Ketones Urine 10 (A) NEG^Negative mg/dL    Specific Gravity Urine 1.017 1.003 - 1.035    Blood Urine Moderate  (A) NEG^Negative    pH Urine 5.0 5.0 - 7.0 pH    Protein Albumin Urine 10 (A) NEG^Negative mg/dL    Urobilinogen mg/dL Normal 0.0 - 2.0 mg/dL    Nitrite Urine Negative NEG^Negative    Leukocyte Esterase Urine Trace (A) NEG^Negative    Source Midstream Urine     WBC Urine 5 0 - 5 /HPF    RBC Urine 76 (H) 0 - 2 /HPF    Squamous Epithelial /HPF Urine 2 (H) 0 - 1 /HPF    Mucous Urine Present (A) NEG^Negative /LPF   US Pelvis Cmplt w Transvag & Doppler LmtPel Duplex Limited    Narrative    EXAMINATION: US PELVIS COMPLETE W TRANSVAGINAL AND DOPPLER LIMITED,  6/12/2020 12:50 PM     COMPARISON: None.    HISTORY: Pelvic pain    TECHNIQUE: The pelvis was scanned in standard fashion with  transabdominal and transvaginal transducer(s) using both grey scale  and limited color Doppler techniques as needed.    FINDINGS:    Uterus measures 9.6 x 5.9 x 5.6 cm. Heterogeneous, poorly visualized  myometrium with a 2.6 x 2.3 x 2.4 cm subserosal or intramural fibroid  on the right and a 1.4 x 2.4 x 1.4 cm intramural or submucosal fibroid  in the mid uterine body.    Endometrium measures 6 mm and is difficult to visualize. An IUD is  present. It is difficult to visualize in its entirety. In particular,  the arms are not well seen.    The right ovary measures 7.1 x 3.7 x 3.8 cm. Normal flow. In the right  ovary, there is a homogeneously hypoechoic lesion measuring 1.6 x 1.3  cm. There is also a larger, homogeneously hyperechoic lesion with  increased through transmission, measuring 3.0 x 3.7 x 4.0 cm. There is  some detectable internal vascularity. No significant large shadowing  calcification. There are some small, separate simple appearing  follicles in the right ovary.    The left ovary measures 6.8 x 7.3 x 4.2 cm. Normal flow. In the left  ovary is a homogeneously hyperechoic lobulated mass measuring 6.8 x  5.9 x 3.6 cm. This also has increased through transmission and is  without significant shadowing calcification. There is some  detectable  internal vascularity.    There is mild anechoic free fluid.      Impression    IMPRESSION:     1.  Negative for ovarian torsion.    2.  There are bilateral ovarian masses. The largest measures 6.8 cm on  the left, and the largest on the right measures 4.0 cm. These are  indeterminate in nature. They may represent endometriomas, hemorrhagic  cysts, or mature cystic teratomas, despite the lack of a visualized  cystic component or ectodermal elements. Please consider outpatient  gynecologic surgical consultation and consideration of further  evaluation with MRI.    3.  IUD present but not well seen. Although it appears in good  position, the integrity of the IUD is not confirmed, and can be  assessed by plain pelvic radiograph.    4.  There are a couple of fibroids and uterus. In addition, the  myometrium is heterogeneous and the endometrium is ill-defined.  Underlying adenomyosis is not excluded.    Discussed with Genesis Chapman by Dr. Perez via telephone at 1313 on  6/12/2020.    KAMALJIT PEREZ MD   Abdomen XR 1 vw    Narrative    ABDOMEN ONE VIEW June 12, 2020 2:24 PM     HISTORY: Intrauterine device, unclear on ultrasound, rad request.    COMPARISON: None.       Impression    IMPRESSION: Intrauterine device projects centrally within the pelvis.  Minimal amount of stool. Nonobstructed bowel gas pattern.    MEL HOLDEN MD       Assessment/Plan: Jefe Blanc is a 40 year old  here with pelvic pain, found to have bilateral ovarian masses, largest 6.8cm, concerning for possible ovarian torsion due to large ovarian cysts, appear to be endometriomas.     Plan for dx laparoscopy, discussed risks/benefits/alternatives of procedure. Patient consents.   NPO until then.   Pain mgmt per ER  Plan for discontinue to home post op     Odilia Nieves MD  6/12/2020  3:57 PM    Mona JIMENEZ

## 2020-06-12 NOTE — ED NOTES
Bed: ED19  Expected date: 6/12/20  Expected time: 10:30 AM  Means of arrival:   Comments:  Triage

## 2020-06-13 VITALS
TEMPERATURE: 97.9 F | RESPIRATION RATE: 16 BRPM | HEIGHT: 63 IN | DIASTOLIC BLOOD PRESSURE: 71 MMHG | WEIGHT: 152 LBS | BODY MASS INDEX: 26.93 KG/M2 | HEART RATE: 68 BPM | OXYGEN SATURATION: 98 % | SYSTOLIC BLOOD PRESSURE: 120 MMHG

## 2020-06-13 PROBLEM — N94.89 ADNEXAL MASS: Status: ACTIVE | Noted: 2020-06-13

## 2020-06-13 PROCEDURE — 25000132 ZZH RX MED GY IP 250 OP 250 PS 637: Performed by: OBSTETRICS & GYNECOLOGY

## 2020-06-13 PROCEDURE — 25000128 H RX IP 250 OP 636: Performed by: OBSTETRICS & GYNECOLOGY

## 2020-06-13 PROCEDURE — 25000125 ZZHC RX 250: Performed by: OBSTETRICS & GYNECOLOGY

## 2020-06-13 RX ADMIN — FAMOTIDINE 20 MG: 20 TABLET ORAL at 07:57

## 2020-06-13 RX ADMIN — KETOROLAC TROMETHAMINE 30 MG: 30 INJECTION, SOLUTION INTRAMUSCULAR at 06:53

## 2020-06-13 RX ADMIN — KETOROLAC TROMETHAMINE 30 MG: 30 INJECTION, SOLUTION INTRAMUSCULAR at 01:24

## 2020-06-13 RX ADMIN — FAMOTIDINE 20 MG: 10 INJECTION, SOLUTION INTRAVENOUS at 01:25

## 2020-06-13 RX ADMIN — ONDANSETRON 4 MG: 4 TABLET, ORALLY DISINTEGRATING ORAL at 01:47

## 2020-06-13 NOTE — PROGRESS NOTES
"Gyn Post-operative Note POD# 1    S:  Patient doing well.  Pain much better than when she came in to the ED last night.  Well controlled on pain medication. Ambulating. Tolerating small amounts of regular diet.  Bryan out.  Voiding.  Passing flatus.      O:   /70 (BP Location: Right arm)   Pulse 68   Temp 98  F (36.7  C) (Oral)   Resp 10   Ht 1.6 m (5' 3\")   Wt 68.9 kg (152 lb)   LMP 06/05/2020   SpO2 97%   BMI 26.93 kg/m     No intake/output data recorded.  Gen- A&O, NAD  Abd- soft, non-tender, non-distended, no guarding or rebound  Incision(s)- C/D/I (surgical glue)  Ext- Non-tender, no edema    Labs:    Hemoglobin   Date Value Ref Range Status   06/12/2020 12.2 11.7 - 15.7 g/dL Final       A/P:  40 year old POD# 1 s/p laparoscopic left ovarian detorsion, left salpingo-oophorectomy, right ovarian cystectomy and removal of right ovarian solid mass.       1.  Routine post-op cares  2.  Advance cares per kiki  3.  Anticipate d/c home today.  4.  Pathology pending    Arelis Montero MD  6/13/2020  6:39 AM   "

## 2020-06-13 NOTE — PROGRESS NOTES
Called by RN due to concern for sleepiness in going home.     Patient unable to go home too sleepy.     Will admit pt overnight to outpt in a bed. Plan for discharge to home tomorrow am.     Discussed with on call MD to make aware   Orders written     Odilia Nieves MD  6/12/2020  10:42 PM

## 2020-06-13 NOTE — DISCHARGE INSTRUCTIONS
We took your left ovary and fallopian tube due to torsion (twisting) and solid mass on your left ovary. Your right ovary had a cyst that was removed.     Please call [phone] ([facility]) to schedule your post op exam. You will need to be seen in 2 weeks to discuss your surgery.     Please call if you have fever, severe abdominal pain, significant bleeding.       Today you received Toradol, an antiinflammatory medication similar to Ibuprofen.  You should not take other antiinflammatory medication, such as Ibuprofen, Motrin, Advil, Aleve, Naprosyn, etc until 0200 6/13/2020.       Same Day Surgery Discharge Instructions for  Sedation and General Anesthesia       It's not unusual to feel dizzy, light-headed or faint for up to 24 hours after surgery or while taking pain medication.  If you have these symptoms: sit for a few minutes before standing and have someone assist you when you get up to walk or use the bathroom.      You should rest and relax for the next 24 hours. We recommend you make arrangements to have an adult stay with you for at least 24 hours after your discharge.  Avoid hazardous and strenuous activity.      DO NOT DRIVE any vehicle or operate mechanical equipment for 24 hours following the end of your surgery.  Even though you may feel normal, your reactions may be affected by the medication you have received.      Do not drink alcoholic beverages for 24 hours following surgery.       Slowly progress to your regular diet as you feel able. It's not unusual to feel nauseated and/or vomit after receiving anesthesia.  If you develop these symptoms, drink clear liquids (apple juice, ginger ale, broth, 7-up, etc. ) until you feel better.  If your nausea and vomiting persists for 24 hours, please notify your surgeon.        All narcotic pain medications, along with inactivity and anesthesia, can cause constipation. Drinking plenty of liquids and increasing fiber intake will help.      For any  questions of a medical nature, call your surgeon.      Do not make important decisions for 24 hours.      If you had general anesthesia, you may have a sore throat for a couple of days related to the breathing tube used during surgery.  You may use Cepacol lozenges to help with this discomfort.  If it worsens or if you develop a fever, contact your surgeon.       If you feel your pain is not well managed with the pain medications prescribed by your surgeon, please contact your surgeon's office to let them know so they can address your concerns.       CoVid 19 Information    We want to give you information regarding Covid. Please consult your primary care provider with any questions you might have.     Patient who have symptoms (cough, fever, or shortness of breath), need to isolate for 7 days from when symptoms started OR 72 hours after fever resolves (without fever reducing medications) AND improvement of respiratory symptoms (whichever is longer).      Isolate yourself at home (in own room/own bathroom if possible)    Do Not allow any visitors    Do Not go to work or school    Do Not go to Jehovah's witness,  centers, shopping, or other public places.    Do Not shake hands.    Avoid close and intimate contact with others (hugging, kissing).    Follow CDC recommendations for household cleaning of frequently touched services.     After the initial 7 days, continue to isolate yourself from household members as much as possible. To continue decrease the risk of community spread and exposure, you and any members of your household should limit activities in public for 14 days after starting home isolation.     You can reference the following CDC link for helpful home isolation/care tips:  https://www.cdc.gov/coronavirus/2019-ncov/downloads/10Things.pdf    Protect Others:    Cover Your Mouth and Nose with a mask, disposable tissue or wash cloth to avoid spreading germs to others.    Wash your hands and face frequently  with soap and water    Call Your Primary Doctor If: Breathing difficulty develops or you become worse.    For more information about COVID19 and options for caring for yourself at home, please visit the CDC website at https://www.cdc.gov/coronavirus/2019-ncov/about/steps-when-sick.html  For more options for care at [facility], please visit our website at [email or web address]          HOME CARE FOLLOWING LAPAROSCOPY    Diet  You have no restrictions on your diet.  During the evening following surgery, drink plenty of fluids and eat a light supper.    Nausea  The anesthesia may produce some nausea.  If you feel nauseated, stay in bed and try drinking fluids such as 7-Up, tea, or soup.    Discomfort  The amount of discomfort you can expect is very unpredictable.  If you have pain that cannot be controlled with Tylenol or with the prescription you may have received, you should notify your physician.  The following complaints are not uncommon and should not be cause for concern:   1.  Abdominal tenderness; abdominal cramping   2.  Low backache or pain radiating to your shoulders, chest or back. This is a result of the gas used to inflate your abdomen during surgery. Lying flat in bed seems to help relieve this.   3.  Sore throat for a day or two resulting from the anesthesia tube used during surgery.   4.  Black or blue pablo on your abdomen.     Drainage  You may expect a small amount of drainage from the incision on your abdomen and you may change the bandage when necessary.  You will also have a small amount of vaginal drainage for several days; this is normal and no cause for concern.  If excessive bleeding occurs, notify your physician.      If dye was used during your procedure, your urine will initially be bright blue. It will gradually return to yellow throughout the day. Drinking plenty of fluids will help to filter the dye from your urine.    Fever  A low grade fever (not over  100 F) is usual after this procedure.  Do not hesitate to notify your physician if your fever seems excessive.    Stitches  If your stitches are the type that must be removed, your doctor will instruct you to return to their office.    Activity  Rest on the day of surgery then you may resume your normal activity, as tolerated. Avoid heavy lifting for one week.    You may shower.  Do not douche, and do not use tampons.             Emergency Care  Contact your physician if you have any of these problems:   1.  A fever over 100 F   2.  A large amount of bleeding or drainage   3.  Severe pain        **If you have questions or concerns about your procedure,   call Dr. Nieves at [phone]**

## 2020-06-13 NOTE — PROGRESS NOTES
Discharge to home. VSS. Pain well controlled. Incisions CDI. No nausea. Tolerated breakfast. Voiding. Ambulatory. Went over discharge instructions with patient. Questions answered. Meds picked up by family yesterday from pharmacy. PIV removed. Med rec was showing as incompleted; verified with Dr. Montero that she had reviewed meds that were initiated. Also, electronic signature order not showing as complete but verbal order from Dr. Montero, ok to discharge patient. Belongings sent with pt. Pt left unit via wheelchair with NA at 1045 to discharge home.

## 2020-06-13 NOTE — OR NURSING
Per patient request, her visa credit card was given to her sister to pay for the prescriptions.   Sister will pick them up from the pharmacy tomorrow when patient is discharged.

## 2020-06-13 NOTE — PLAN OF CARE
Patient arrived from PACU at approximately 2330.  A&Ox4.  VSS, RA, Capno WNL.  Complained of abdominal pain; controlled with Toradol.  Zofran x 1 given for mild nausea.  Regular diet.  PIV SL.  3 lap sites to abdomen, covered with band-aids; CDI.  Discharge to home probably today.

## 2020-06-13 NOTE — BRIEF OP NOTE
[facility] [place]    Brief Operative Note    Pre-operative diagnosis: Pelvic pain [R10.2]  Post-operative diagnosis   1. Pelvic pain  2. bilateral ovarian masses (pathology pending)  3. left ovarian torsion and pelvic congestion  4. right ovarian simple and hemorrhagic cysts   5. Endometriosis   6. Fibroid uterus     Procedure:   1.laparoscopic left ovarian detorsion  2.left salpingo-oophorectomy  3.right ovarian cystectomy and removal of right ovarian solid mass    Surgeon: Surgeon(s) and Role:     * Odilia Nieves MD - Primary     * Arelis Montero MD - Assisting  Anesthesia: General   Estimated blood loss: 25 ml   Drains: None  Specimens:   ID Type Source Tests Collected by Time Destination   1 :  Washings Abdomen CYTOLOGY NON GYN Odilia Nieves MD 6/12/2020  6:52 PM    A : RIGHT OVARIAN CYST Tissue Ovary, Right SURGICAL PATHOLOGY EXAM Odilia Nieves MD 6/12/2020  7:20 PM    B : PORTION OF RIGHT OVARY Tissue Ovary, Right SURGICAL PATHOLOGY EXAM Odilia Nieves MD 6/12/2020  7:40 PM    C : LEFT FALLOPIAN TUBE AND OVARY Tissue Fallopian Tube and Ovary, Left SURGICAL PATHOLOGY EXAM Odilia Nieves MD 6/12/2020  7:49 PM      Findings:   See op note   Complications: None.  Implants: * No implants in log *    ml clear yellow  IVF 1800 ml.

## 2020-06-13 NOTE — PROGRESS NOTES
To Whom It May Concern:    Jefe Blanc was treated on an emergency basis at [facility] on 6/12-6/13/2020. She underwent surgery and requires post operative recovery. I recommend she stay home from work through at least Thursday 6/18. If returning to work this week, reduced hours (1/2 days) would be preferred.    Her activity restrictions are: no lifting >15 lbs x 3 weeks.    Thank you,    Arelis Montero MD

## 2020-06-13 NOTE — ANESTHESIA CARE TRANSFER NOTE
Patient: Jefe Blanc    Procedure(s):  Diagnostic laparoscopy, possible ovarian torsion, bilateral ovarian cysts.    Diagnosis: Pelvic pain [R10.2]  Diagnosis Additional Information: No value filed.    Anesthesia Type:   General     Note:  Airway :Face Mask  Patient transferred to:PACU  Handoff Report: Identifed the Patient, Identified the Reponsible Provider, Reviewed the pertinent medical history, Discussed the surgical course, Reviewed Intra-OP anesthesia mangement and issues during anesthesia, Set expectations for post-procedure period and Allowed opportunity for questions and acknowledgement of understanding      Vitals: (Last set prior to Anesthesia Care Transfer)    CRNA VITALS  6/12/2020 2018 - 6/12/2020 2054 6/12/2020             NIBP:  (!) 151/123    NIBP Mean:  136                Electronically Signed By: TORRES Hammond CRNA  June 12, 2020  8:54 PM

## 2020-06-13 NOTE — ANESTHESIA POSTPROCEDURE EVALUATION
Patient: Jefe Blanc    Procedure(s):  Diagnostic laparoscopy, possible ovarian torsion, bilateral ovarian cysts.    Diagnosis:Pelvic pain [R10.2]  Diagnosis Additional Information: No value filed.    Anesthesia Type:  General    Note:  Anesthesia Post Evaluation    Patient location during evaluation: Bedside  Patient participation: Able to fully participate in evaluation  Level of consciousness: awake and alert  Pain management: adequate  Airway patency: patent  Cardiovascular status: acceptable  Respiratory status: acceptable  Hydration status: acceptable  PONV: none             Last vitals:  Vitals:    06/12/20 2130 06/12/20 2140 06/12/20 2150   BP: (!) 131/90 132/88 (!) 132/90   Pulse:  55 56   Resp: 9 10 9   Temp:   36.5  C (97.7  F)   SpO2: 100% 99% 96%         Electronically Signed By: Chavez Patel MD  June 12, 2020  10:26 PM

## 2020-06-13 NOTE — OR NURSING
Patient still very sleepy, arousable, but drifts off to sleep midsentence.  Spoke with anesthesiologist, Dr. Patel, will call Surgeon to see if pt can spend the night.

## 2020-06-14 NOTE — OP NOTE
Procedure Date: 06/12/2020      SURGEON:  Odilia Nieves MD      ASSISTANT:  Arelis Montero MD.  Dr. Montero was medically necessary due to the difficulty in procedure and need for further assistance with retraction and assistance with surgical removal of right ovarian cyst due to the large nature of the ovaries.      COMPLICATIONS:  None.      PROCEDURES PERFORMED:   1. Laparoscopic left ovarian detorsion.   2. Left salpingo-oophorectomy.   3. Right ovarian cystectomy and removal of right ovarian solid mass.      PREOPERATIVE DIAGNOSES:  Pelvic pain, bilateral ovarian enlargement with bilateral ovarian cystic masses and concern for ovarian torsion.      POSTOPERATIVE DIAGNOSES:   1. Pelvic pain.   2. Bilateral ovarian masses.   3. Left ovarian torsion and pelvic congestion with solid mass   4. Right ovarian simple hemorrhagic cyst.   5. Endometriosis.   6. Fibroid uterus.      ESTIMATED BLOOD LOSS:  25 mL.      INTRAVENOUS FLUIDS:  1800 mL.      URINE OUTPUT:  350 mL of clear yellow urine.      ANESTHESIA:  General with endotracheal tube.      SPECIMENS:   1. Pelvic washings.   2. Right ovarian cyst.   3. right ovary - solid nodule.   4. Left fallopian tube and ovary.      FINDINGS:   1. Normal-appearing external genitalia.   2. Cervix with benign appearance and IUD strings coming from the external os.   3. Bimanual revealed a pelvic mass in the pelvis.  The uterus was freely mobile, but palpates enlarged.   4. Bilateral ovaries significantly enlarged laparoscopically.  Right ovary with a hemorrhagic as well as simple cyst as well as solid component of the right ovary filled with gelatinous cyst tissue.   No torsion evident.   5. Left ovary was significantly enlarged and entirely solid in nature with fatty predominance, cystic areas torsion x1 with left ovary.   6. Bilateral fallopian tubes with evidence of prior tubal ligation.   7. Cul-de-sac with endometriosis-appearing lesion.   8. Enlarged fibroid uterus  "with large fibroid on the anterior fundal surface that was approximately 4 cm in diameter.   9. Liver edge and gallbladder appeared normal.   10. Endometriosis appearing lesions in cul de sac      INDICATIONS FOR PROCEDURE:  Ms. Blanc is a 40-year-old, -0-1-5 who presented to the ER with pelvic pain.  She noted pelvic pain that was chronic in nature for the past several months since 2019 and initially intermittent.  She was seen previously in the ER in 10/2019 for worsening pain and was diagnosed with pelvic cysts at that time.  She was having severe worsening of her pelvic pain today as well as vaginal bleeding.  She does have nausea today and last ate at 6:30 in the morning.  She had an ultrasound in the ER, which showed right ovary measuring 7.1 x 3.7 x 3.8 cm with normal blood flow with a homogeneously hypoechoic lesion measuring 1.6 x 1.3 cm as well as a hyperechoic lesion measuring 3 x 3.7 x 4 cm with internal vascularity.  Septate follicles are also notable in the right ovary.  The left ovary measured 6.8 x 7.3 x 4.2 cm with normal flow.  The left ovary had a hyperechoic, lobulated mass measuring 6.8 x 5.1 x 3.6 with increased hyperechoicity and internal vascularity as well as some mild \"free fluid.\"      Due to the severity of the patient's pain and concern for intermittent ovarian torsion due to large size of the ovaries with ovarian cyst, the patient was recommended to proceed with a diagnostic laparoscopy, possible detorsion of ovary as well as possible ovarian cystectomy.  The patient understood and consented.      Risks and benefits were discussed with the patient including risk of injury to bladder, bowel and surrounding organs, infection, bleeding requiring a blood transfusion or further surgeries, risk of need for additional procedures if extensive endometriosis was diagnosed, risk of anesthesia including stroke, heart attack or death.  The patient understood.      DESCRIPTION:  The patient " was taken to the OR, where a timeout was performed to confirm correct patient and correct procedure.  The patient was established under general anesthesia with endotracheal tube, and appropriate wait time of 14 minutes was done prior to entering the OR due to COVID unknown status.  The patient was then prepped and draped after positioning in the dorsal lithotomy position.  The attention was then turned to the pelvis, where Bryan catheter was placed.  [place] speculum was placed into the vagina, and the cervix was grasped on the anterior lip of the cervix with a single-tooth tenaculum.  The acorn manipulator was then placed in the cervix.  Prior to starting the procedure, bimanual examination was performed.  The uterus was found to be enlarged, and bilateral pelvic masses were notable on exam.      Gloves were changed and attention was turned to the laparoscopic portion of this procedure.  A scalpel was used to make a small umbilical incision that was 5 mm after injection with local.  The Veress needle was inserted, and opening pressure was obtained at 5 mmHg, and insufflation was obtained up to 15 mmHg with carbon dioxide.  The laparoscopic port was placed under direct visualization with the laparoscopic camera, and entry was confirmed with laparoscopic camera. Directly under the laparoscope was inspected and found to be free of injury.  The patient was positioned in Trendelenburg, and the pelvis was inspected.  Here, moderate amount of clear free fluid was notable.  Large bilateral ovarian masses were notable.  The left ovary was primarily solid in component and had a torsion x 1.  The infundibulopelvic ligament appeared to be congested and dilated, confirming intermittent torsion.  The uterus was enlarged, and a large anterior fundal fibroid was notable, approximately 4 cm. The right ovary was enlarged as well and solid and cystic in appearance.     The local was injected into the right lower quadrant.  A scalpel  was used to make a 5 mm incision, and trocar was inserted under direct visualization.  A similar 5 mm port was made on the left lower quadrant.  Due to the large nature of the ovaries, although likely benign, pelvic washings were obtained.  The liver edge was identified and normal, as well as the gallbladder.  In the cul-de-sac, there were endometriosis-appearing lesions notable with white lesions on the serosa.  The bilateral fallopian tubes had the appearance of a prior tubal ligation.      Decision at this point in time was made to proceed with a left salpingo-oophorectomy due to the large nature and torsion and inability to debulk the ovary based noncystic nature.  The left IP ligament was grasped with the LigaSure device, and it was cauterized and transected with good hemostasis.  The left utero-ovarian ligament was then grasped with the LigaSure device, and it was cauterized and transected.  The ovary and fallopian tube were the freed and hemostasis was confirmed.      The attention was then turned to the right ovary, which was inspected and found to be similarly sized, approximately 7 cm, but had a less solid nature and had 2 apparent large ovarian cysts, one hemorrhagic in nature, which was ruptured with the monopolar cautery device and deflated.  The serous ovarian cyst was also similarly ruptured.  The central portion of the ovary appeared to be cystic in nature, and so the wall of the ovary was incised with the monopolar cutting device, and cyst wall was undermined with the laparoscopic graspers as well as the [place] graspers, and ovarian cyst material was removed and sent to Pathology for further review.  The ovary also had a small nodule in the right ovary.  The apical portion of this was excised with the LigaSure device and sent to Pathology separately.      The specimens were placed into a 15 mm bag after extending th LLQ incision and placing a 12 mm port. The mass was brought to the surface and  removed with dissection.  Left ovarian tissue was solid and fat-like in appearance, and specimens were sent to Pathology separately.  The pelvis was then irrigated thoroughly, and right ovary was not hemostatic in the lateral portion of the incision, and this was cauterized with the LigaSure device, and Surgicel surgical powder was placed into the ovarian cystectomy site with optimal hemostasis.  Left salpingo-oophorectomy site was inspected and found to be hemostatic.      The larger incision that was approximately 2-3 cm long was closed in the fascia with 0 Vicryl on a UR-6 needle with good fascial closure.  Pneumoperitoneum was evacuated and other ports removed. The 5 mm port incisions were closed with Monocryl in a single interrupted stitch.  The larger port skin incision was closed with 4-0 Monocryl in a running nonlocked fashion.  Good hemostasis was noted.  Exofin skin glue was applied.      Attention was then turned to the pelvis, where a single-tooth tenaculum and acorn manipulator were removed.  Good hemostasis was noted on the cervix, and IUD strings were still visualized in the external cervical os.  The patient's course as well as plan of care was discussed with the patient's sister, and the patient was moved to the recovery area in a stable fashion.  All needle, instrument and sponge counts were correct x 2 at the end of the case.            ODILIA NIEVES MD             D: 2020   T: 2020   MT: SERGIO      Name:     SHANIQUE KUNZ   MRN:      [id number]        Account:        [id number]   :      1980           Procedure Date: 2020      Document: [id number]       cc: Odilia Nieves MD

## 2020-06-16 LAB — COPATH REPORT: NORMAL

## 2020-06-17 ENCOUNTER — TELEPHONE (OUTPATIENT)
Dept: OBGYN | Facility: CLINIC | Age: 40
End: 2020-06-17

## 2020-06-17 LAB — COPATH REPORT: NORMAL

## 2020-06-17 NOTE — TELEPHONE ENCOUNTER
Called patient post op to discuss her surgery with her and see how she was doing post op. Called to discuss her pathology report.     Patient did not  (5:24 PM)    Called again to see if she would  (5:35pm )    She picked up and is doing okay from surgical standpoint. She is taking the toradol Q 6 hrs, only required norco x 3 doses. LLQ incision is the most painful.     Some pain overall with movement. Has voided, had first BM yesterday, no issues with this.     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 Discussed usually bowel in origin and we cannot know further prognosis without further primary info/if there are other mets. Discussed recommendation to send to GI as well as Gyn Onc for further care. Pt without insurance, will also have  from our office call her and help see if she can set something up.     Patient understands POC.    Odilia Nieves MD   6/17/2020  5:46 PM

## 2020-06-23 ENCOUNTER — HOSPITAL ENCOUNTER (OUTPATIENT)
Dept: MAMMOGRAPHY | Facility: CLINIC | Age: 40
Discharge: HOME | End: 2020-06-23
Attending: OBSTETRICS & GYNECOLOGY | Admitting: OBSTETRICS & GYNECOLOGY
Payer: MEDICAID

## 2020-06-23 DIAGNOSIS — Z12.31 VISIT FOR SCREENING MAMMOGRAM: ICD-10-CM

## 2020-06-23 PROCEDURE — 77067 SCR MAMMO BI INCL CAD: CPT

## 2020-06-26 ENCOUNTER — TRANSFERRED RECORDS (OUTPATIENT)
Dept: HEALTH INFORMATION MANAGEMENT | Facility: CLINIC | Age: 40
End: 2020-06-26

## 2020-07-13 DIAGNOSIS — Z11.59 ENCOUNTER FOR SCREENING FOR OTHER VIRAL DISEASES: Primary | ICD-10-CM

## 2020-07-13 PROBLEM — C56.3 OVARIAN CANCER, BILATERAL (H): Status: ACTIVE | Noted: 2020-07-13

## 2020-07-24 ENCOUNTER — PATIENT OUTREACH (OUTPATIENT)
Dept: CARE COORDINATION | Facility: CLINIC | Age: 40
End: 2020-07-24

## 2020-07-24 DIAGNOSIS — Z11.59 ENCOUNTER FOR SCREENING FOR OTHER VIRAL DISEASES: ICD-10-CM

## 2020-07-24 DIAGNOSIS — F32.A DEPRESSION: Primary | ICD-10-CM

## 2020-07-24 PROCEDURE — U0003 INFECTIOUS AGENT DETECTION BY NUCLEIC ACID (DNA OR RNA); SEVERE ACUTE RESPIRATORY SYNDROME CORONAVIRUS 2 (SARS-COV-2) (CORONAVIRUS DISEASE [COVID-19]), AMPLIFIED PROBE TECHNIQUE, MAKING USE OF HIGH THROUGHPUT TECHNOLOGIES AS DESCRIBED BY CMS-2020-01-R: HCPCS | Performed by: OBSTETRICS & GYNECOLOGY

## 2020-07-24 NOTE — PROGRESS NOTES
PTA medications updated by Medication Scribe prior to surgery via phone call with patient      -LAST DOSES ENTERED BY NURSE-    Medication history sources: Patient, Surescripts and H&P  Medication history source reliability: Good  Adherence assessment: N/A Not Observed    Significant changes made to the medication list:  None      Additional medication history information:   None        Prior to Admission medications    Medication Sig Last Dose Taking? Auth Provider   HYDROcodone-acetaminophen (NORCO) 5-325 MG tablet Take 1 tablet by mouth every 6 hours as needed for pain  at PRN Yes Odilia Nieves MD   ketorolac (TORADOL) 10 MG tablet Take 1 tablet (10 mg) by mouth every 6 hours as needed for moderate pain  at PRN Yes Odilia Nieves MD   levonorgestrel (MIRENA) 20 MCG/24HR IUD 1 each by Intrauterine route once IN PLACE Yes Reported, Patient

## 2020-07-25 LAB
SARS-COV-2 RNA SPEC QL NAA+PROBE: NOT DETECTED
SPECIMEN SOURCE: NORMAL

## 2020-07-26 ENCOUNTER — ANESTHESIA EVENT (OUTPATIENT)
Dept: SURGERY | Facility: CLINIC | Age: 40
End: 2020-07-26
Payer: MEDICAID

## 2020-07-27 ENCOUNTER — HOSPITAL ENCOUNTER (INPATIENT)
Facility: CLINIC | Age: 40
LOS: 9 days | Discharge: HOME | End: 2020-08-05
Attending: OBSTETRICS & GYNECOLOGY | Admitting: OBSTETRICS & GYNECOLOGY
Payer: MEDICAID

## 2020-07-27 ENCOUNTER — ANESTHESIA (OUTPATIENT)
Dept: SURGERY | Facility: CLINIC | Age: 40
End: 2020-07-27
Payer: MEDICAID

## 2020-07-27 DIAGNOSIS — Z98.890 POST-OPERATIVE STATE: ICD-10-CM

## 2020-07-27 DIAGNOSIS — R19.00 PELVIC MASS IN FEMALE: ICD-10-CM

## 2020-07-27 DIAGNOSIS — C56.3 OVARIAN CANCER, BILATERAL (H): Primary | ICD-10-CM

## 2020-07-27 LAB
B-HCG SERPL-ACNC: <1 IU/L (ref 0–5)
CREAT SERPL-MCNC: 0.68 MG/DL (ref 0.52–1.04)
GFR SERPL CREATININE-BSD FRML MDRD: >90 ML/MIN/{1.73_M2}
HGB BLD-MCNC: 12.3 G/DL (ref 11.7–15.7)
PLATELET # BLD AUTO: 213 10E9/L (ref 150–450)

## 2020-07-27 PROCEDURE — 88342 IMHCHEM/IMCYTCHM 1ST ANTB: CPT | Mod: 26 | Performed by: OBSTETRICS & GYNECOLOGY

## 2020-07-27 PROCEDURE — 88305 TISSUE EXAM BY PATHOLOGIST: CPT | Mod: 26 | Performed by: OBSTETRICS & GYNECOLOGY

## 2020-07-27 PROCEDURE — 37000009 ZZH ANESTHESIA TECHNICAL FEE, EACH ADDTL 15 MIN: Performed by: OBSTETRICS & GYNECOLOGY

## 2020-07-27 PROCEDURE — 25000566 ZZH SEVOFLURANE, EA 15 MIN: Performed by: OBSTETRICS & GYNECOLOGY

## 2020-07-27 PROCEDURE — 27210794 ZZH OR GENERAL SUPPLY STERILE: Performed by: OBSTETRICS & GYNECOLOGY

## 2020-07-27 PROCEDURE — 25800030 ZZH RX IP 258 OP 636: Performed by: ANESTHESIOLOGY

## 2020-07-27 PROCEDURE — 88341 IMHCHEM/IMCYTCHM EA ADD ANTB: CPT | Mod: 26 | Performed by: OBSTETRICS & GYNECOLOGY

## 2020-07-27 PROCEDURE — 25000128 H RX IP 250 OP 636: Performed by: NURSE ANESTHETIST, CERTIFIED REGISTERED

## 2020-07-27 PROCEDURE — 36000063 ZZH SURGERY LEVEL 4 EA 15 ADDTL MIN: Performed by: OBSTETRICS & GYNECOLOGY

## 2020-07-27 PROCEDURE — 85018 HEMOGLOBIN: CPT | Performed by: OBSTETRICS & GYNECOLOGY

## 2020-07-27 PROCEDURE — 0UT00ZZ RESECTION OF RIGHT OVARY, OPEN APPROACH: ICD-10-PCS | Performed by: OBSTETRICS & GYNECOLOGY

## 2020-07-27 PROCEDURE — 0UT90ZL RESECTION OF UTERUS, SUPRACERVICAL, OPEN APPROACH: ICD-10-PCS | Performed by: OBSTETRICS & GYNECOLOGY

## 2020-07-27 PROCEDURE — 85049 AUTOMATED PLATELET COUNT: CPT | Performed by: NURSE PRACTITIONER

## 2020-07-27 PROCEDURE — P9041 ALBUMIN (HUMAN),5%, 50ML: HCPCS | Performed by: NURSE ANESTHETIST, CERTIFIED REGISTERED

## 2020-07-27 PROCEDURE — 0DBW0ZZ EXCISION OF PERITONEUM, OPEN APPROACH: ICD-10-PCS | Performed by: OBSTETRICS & GYNECOLOGY

## 2020-07-27 PROCEDURE — 25000125 ZZHC RX 250: Performed by: ANESTHESIOLOGY

## 2020-07-27 PROCEDURE — 25800030 ZZH RX IP 258 OP 636: Performed by: NURSE ANESTHETIST, CERTIFIED REGISTERED

## 2020-07-27 PROCEDURE — 0DJW4ZZ INSPECTION OF PERITONEUM, PERCUTANEOUS ENDOSCOPIC APPROACH: ICD-10-PCS | Performed by: OBSTETRICS & GYNECOLOGY

## 2020-07-27 PROCEDURE — 71000012 ZZH RECOVERY PHASE 1 LEVEL 1 FIRST HR: Performed by: OBSTETRICS & GYNECOLOGY

## 2020-07-27 PROCEDURE — 84702 CHORIONIC GONADOTROPIN TEST: CPT | Performed by: OBSTETRICS & GYNECOLOGY

## 2020-07-27 PROCEDURE — 07BD0ZZ EXCISION OF AORTIC LYMPHATIC, OPEN APPROACH: ICD-10-PCS | Performed by: OBSTETRICS & GYNECOLOGY

## 2020-07-27 PROCEDURE — 25000128 H RX IP 250 OP 636: Performed by: OBSTETRICS & GYNECOLOGY

## 2020-07-27 PROCEDURE — 25000128 H RX IP 250 OP 636: Performed by: ANESTHESIOLOGY

## 2020-07-27 PROCEDURE — 25000125 ZZHC RX 250: Performed by: NURSE ANESTHETIST, CERTIFIED REGISTERED

## 2020-07-27 PROCEDURE — 25000128 H RX IP 250 OP 636: Performed by: NURSE PRACTITIONER

## 2020-07-27 PROCEDURE — 36415 COLL VENOUS BLD VENIPUNCTURE: CPT | Performed by: OBSTETRICS & GYNECOLOGY

## 2020-07-27 PROCEDURE — 36415 COLL VENOUS BLD VENIPUNCTURE: CPT | Performed by: NURSE PRACTITIONER

## 2020-07-27 PROCEDURE — 88307 TISSUE EXAM BY PATHOLOGIST: CPT | Mod: 26 | Performed by: OBSTETRICS & GYNECOLOGY

## 2020-07-27 PROCEDURE — 0DBU0ZZ EXCISION OF OMENTUM, OPEN APPROACH: ICD-10-PCS | Performed by: OBSTETRICS & GYNECOLOGY

## 2020-07-27 PROCEDURE — 36000093 ZZH SURGERY LEVEL 4 1ST 30 MIN: Performed by: OBSTETRICS & GYNECOLOGY

## 2020-07-27 PROCEDURE — 88341 IMHCHEM/IMCYTCHM EA ADD ANTB: CPT | Performed by: OBSTETRICS & GYNECOLOGY

## 2020-07-27 PROCEDURE — 37000008 ZZH ANESTHESIA TECHNICAL FEE, 1ST 30 MIN: Performed by: OBSTETRICS & GYNECOLOGY

## 2020-07-27 PROCEDURE — 25000125 ZZHC RX 250: Performed by: OBSTETRICS & GYNECOLOGY

## 2020-07-27 PROCEDURE — 88309 TISSUE EXAM BY PATHOLOGIST: CPT | Mod: 26 | Performed by: OBSTETRICS & GYNECOLOGY

## 2020-07-27 PROCEDURE — 82565 ASSAY OF CREATININE: CPT | Performed by: NURSE PRACTITIONER

## 2020-07-27 PROCEDURE — 71000013 ZZH RECOVERY PHASE 1 LEVEL 1 EA ADDTL HR: Performed by: OBSTETRICS & GYNECOLOGY

## 2020-07-27 PROCEDURE — 12000000 ZZH R&B MED SURG/OB

## 2020-07-27 PROCEDURE — 88305 TISSUE EXAM BY PATHOLOGIST: CPT | Performed by: OBSTETRICS & GYNECOLOGY

## 2020-07-27 PROCEDURE — 0DTF0ZZ RESECTION OF RIGHT LARGE INTESTINE, OPEN APPROACH: ICD-10-PCS | Performed by: COLON & RECTAL SURGERY

## 2020-07-27 PROCEDURE — 8E0W0CZ ROBOTIC ASSISTED PROCEDURE OF TRUNK REGION, OPEN APPROACH: ICD-10-PCS | Performed by: OBSTETRICS & GYNECOLOGY

## 2020-07-27 PROCEDURE — C1765 ADHESION BARRIER: HCPCS | Performed by: OBSTETRICS & GYNECOLOGY

## 2020-07-27 PROCEDURE — 88342 IMHCHEM/IMCYTCHM 1ST ANTB: CPT | Performed by: OBSTETRICS & GYNECOLOGY

## 2020-07-27 PROCEDURE — 88307 TISSUE EXAM BY PATHOLOGIST: CPT | Performed by: OBSTETRICS & GYNECOLOGY

## 2020-07-27 PROCEDURE — 25800030 ZZH RX IP 258 OP 636: Performed by: NURSE PRACTITIONER

## 2020-07-27 PROCEDURE — 0UT50ZZ RESECTION OF RIGHT FALLOPIAN TUBE, OPEN APPROACH: ICD-10-PCS | Performed by: OBSTETRICS & GYNECOLOGY

## 2020-07-27 PROCEDURE — 88309 TISSUE EXAM BY PATHOLOGIST: CPT | Performed by: OBSTETRICS & GYNECOLOGY

## 2020-07-27 PROCEDURE — 40000170 ZZH STATISTIC PRE-PROCEDURE ASSESSMENT II: Performed by: OBSTETRICS & GYNECOLOGY

## 2020-07-27 PROCEDURE — 25000132 ZZH RX MED GY IP 250 OP 250 PS 637: Performed by: OBSTETRICS & GYNECOLOGY

## 2020-07-27 RX ORDER — ONDANSETRON 2 MG/ML
4 INJECTION INTRAMUSCULAR; INTRAVENOUS EVERY 30 MIN PRN
Status: DISCONTINUED | OUTPATIENT
Start: 2020-07-27 | End: 2020-07-27 | Stop reason: HOSPADM

## 2020-07-27 RX ORDER — DEXAMETHASONE SODIUM PHOSPHATE 4 MG/ML
INJECTION, SOLUTION INTRA-ARTICULAR; INTRALESIONAL; INTRAMUSCULAR; INTRAVENOUS; SOFT TISSUE PRN
Status: DISCONTINUED | OUTPATIENT
Start: 2020-07-27 | End: 2020-07-27

## 2020-07-27 RX ORDER — HYDROMORPHONE HYDROCHLORIDE 1 MG/ML
.3-.5 INJECTION, SOLUTION INTRAMUSCULAR; INTRAVENOUS; SUBCUTANEOUS
Status: DISCONTINUED | OUTPATIENT
Start: 2020-07-27 | End: 2020-08-05 | Stop reason: HOSPADM

## 2020-07-27 RX ORDER — ONDANSETRON 4 MG/1
4 TABLET, ORALLY DISINTEGRATING ORAL EVERY 6 HOURS PRN
Status: DISCONTINUED | OUTPATIENT
Start: 2020-07-27 | End: 2020-08-05 | Stop reason: HOSPADM

## 2020-07-27 RX ORDER — FENTANYL CITRATE 50 UG/ML
25-50 INJECTION, SOLUTION INTRAMUSCULAR; INTRAVENOUS
Status: DISCONTINUED | OUTPATIENT
Start: 2020-07-27 | End: 2020-07-27 | Stop reason: HOSPADM

## 2020-07-27 RX ORDER — NALOXONE HYDROCHLORIDE 0.4 MG/ML
.1-.4 INJECTION, SOLUTION INTRAMUSCULAR; INTRAVENOUS; SUBCUTANEOUS
Status: DISCONTINUED | OUTPATIENT
Start: 2020-07-27 | End: 2020-07-27

## 2020-07-27 RX ORDER — ALBUMIN, HUMAN INJ 5% 5 %
SOLUTION INTRAVENOUS CONTINUOUS PRN
Status: DISCONTINUED | OUTPATIENT
Start: 2020-07-27 | End: 2020-07-27

## 2020-07-27 RX ORDER — NALOXONE HYDROCHLORIDE 0.4 MG/ML
.1-.4 INJECTION, SOLUTION INTRAMUSCULAR; INTRAVENOUS; SUBCUTANEOUS
Status: DISCONTINUED | OUTPATIENT
Start: 2020-07-27 | End: 2020-07-27 | Stop reason: HOSPADM

## 2020-07-27 RX ORDER — ONDANSETRON 4 MG/1
4 TABLET, ORALLY DISINTEGRATING ORAL EVERY 30 MIN PRN
Status: DISCONTINUED | OUTPATIENT
Start: 2020-07-27 | End: 2020-07-27 | Stop reason: HOSPADM

## 2020-07-27 RX ORDER — ACETAMINOPHEN 325 MG/1
975 TABLET ORAL ONCE
Status: COMPLETED | OUTPATIENT
Start: 2020-07-27 | End: 2020-07-27

## 2020-07-27 RX ORDER — SODIUM CHLORIDE, SODIUM LACTATE, POTASSIUM CHLORIDE, CALCIUM CHLORIDE 600; 310; 30; 20 MG/100ML; MG/100ML; MG/100ML; MG/100ML
INJECTION, SOLUTION INTRAVENOUS CONTINUOUS
Status: DISCONTINUED | OUTPATIENT
Start: 2020-07-27 | End: 2020-07-27 | Stop reason: HOSPADM

## 2020-07-27 RX ORDER — MEPERIDINE HYDROCHLORIDE 25 MG/ML
12.5 INJECTION INTRAMUSCULAR; INTRAVENOUS; SUBCUTANEOUS
Status: DISCONTINUED | OUTPATIENT
Start: 2020-07-27 | End: 2020-07-27 | Stop reason: HOSPADM

## 2020-07-27 RX ORDER — HYDRALAZINE HYDROCHLORIDE 20 MG/ML
2.5-5 INJECTION INTRAMUSCULAR; INTRAVENOUS EVERY 10 MIN PRN
Status: DISCONTINUED | OUTPATIENT
Start: 2020-07-27 | End: 2020-07-27 | Stop reason: HOSPADM

## 2020-07-27 RX ORDER — MAGNESIUM HYDROXIDE 1200 MG/15ML
LIQUID ORAL PRN
Status: DISCONTINUED | OUTPATIENT
Start: 2020-07-27 | End: 2020-07-27 | Stop reason: HOSPADM

## 2020-07-27 RX ORDER — PROPOFOL 10 MG/ML
INJECTION, EMULSION INTRAVENOUS CONTINUOUS PRN
Status: DISCONTINUED | OUTPATIENT
Start: 2020-07-27 | End: 2020-07-27

## 2020-07-27 RX ORDER — HYDROCODONE BITARTRATE AND ACETAMINOPHEN 5; 325 MG/1; MG/1
1-2 TABLET ORAL EVERY 4 HOURS PRN
Status: DISCONTINUED | OUTPATIENT
Start: 2020-07-27 | End: 2020-08-05 | Stop reason: HOSPADM

## 2020-07-27 RX ORDER — KETOROLAC TROMETHAMINE 30 MG/ML
30 INJECTION, SOLUTION INTRAMUSCULAR; INTRAVENOUS EVERY 6 HOURS PRN
Status: DISCONTINUED | OUTPATIENT
Start: 2020-07-27 | End: 2020-07-27 | Stop reason: HOSPADM

## 2020-07-27 RX ORDER — DEXTROSE MONOHYDRATE, SODIUM CHLORIDE, AND POTASSIUM CHLORIDE 50; 1.49; 4.5 G/1000ML; G/1000ML; G/1000ML
INJECTION, SOLUTION INTRAVENOUS CONTINUOUS
Status: DISCONTINUED | OUTPATIENT
Start: 2020-07-27 | End: 2020-07-31

## 2020-07-27 RX ORDER — PROPOFOL 10 MG/ML
INJECTION, EMULSION INTRAVENOUS PRN
Status: DISCONTINUED | OUTPATIENT
Start: 2020-07-27 | End: 2020-07-27

## 2020-07-27 RX ORDER — HYDROMORPHONE HYDROCHLORIDE 1 MG/ML
0.5 INJECTION, SOLUTION INTRAMUSCULAR; INTRAVENOUS; SUBCUTANEOUS ONCE
Status: COMPLETED | OUTPATIENT
Start: 2020-07-27 | End: 2020-07-27

## 2020-07-27 RX ORDER — LIDOCAINE HYDROCHLORIDE 20 MG/ML
INJECTION, SOLUTION INFILTRATION; PERINEURAL PRN
Status: DISCONTINUED | OUTPATIENT
Start: 2020-07-27 | End: 2020-07-27

## 2020-07-27 RX ORDER — DEXAMETHASONE SODIUM PHOSPHATE 4 MG/ML
4 INJECTION, SOLUTION INTRA-ARTICULAR; INTRALESIONAL; INTRAMUSCULAR; INTRAVENOUS; SOFT TISSUE EVERY 10 MIN PRN
Status: DISCONTINUED | OUTPATIENT
Start: 2020-07-27 | End: 2020-07-27 | Stop reason: HOSPADM

## 2020-07-27 RX ORDER — CEFAZOLIN SODIUM 1 G/3ML
1 INJECTION, POWDER, FOR SOLUTION INTRAMUSCULAR; INTRAVENOUS SEE ADMIN INSTRUCTIONS
Status: DISCONTINUED | OUTPATIENT
Start: 2020-07-27 | End: 2020-07-27 | Stop reason: HOSPADM

## 2020-07-27 RX ORDER — LIDOCAINE 40 MG/G
CREAM TOPICAL
Status: DISCONTINUED | OUTPATIENT
Start: 2020-07-27 | End: 2020-08-05 | Stop reason: HOSPADM

## 2020-07-27 RX ORDER — KETOROLAC TROMETHAMINE 30 MG/ML
30 INJECTION, SOLUTION INTRAMUSCULAR; INTRAVENOUS EVERY 6 HOURS PRN
Status: DISCONTINUED | OUTPATIENT
Start: 2020-07-27 | End: 2020-07-28

## 2020-07-27 RX ORDER — FENTANYL CITRATE 0.05 MG/ML
25-50 INJECTION, SOLUTION INTRAMUSCULAR; INTRAVENOUS
Status: DISCONTINUED | OUTPATIENT
Start: 2020-07-27 | End: 2020-07-27 | Stop reason: HOSPADM

## 2020-07-27 RX ORDER — LABETALOL HYDROCHLORIDE 5 MG/ML
10 INJECTION, SOLUTION INTRAVENOUS
Status: DISCONTINUED | OUTPATIENT
Start: 2020-07-27 | End: 2020-07-27 | Stop reason: HOSPADM

## 2020-07-27 RX ORDER — PROCHLORPERAZINE MALEATE 10 MG
10 TABLET ORAL EVERY 6 HOURS PRN
Status: DISCONTINUED | OUTPATIENT
Start: 2020-07-27 | End: 2020-08-05 | Stop reason: HOSPADM

## 2020-07-27 RX ORDER — HYDROCODONE BITARTRATE AND ACETAMINOPHEN 5; 325 MG/1; MG/1
1 TABLET ORAL EVERY 6 HOURS PRN
Qty: 10 TABLET | Refills: 0 | Status: SHIPPED | OUTPATIENT
Start: 2020-07-27 | End: 2021-04-15

## 2020-07-27 RX ORDER — CEFAZOLIN SODIUM 2 G/100ML
2 INJECTION, SOLUTION INTRAVENOUS
Status: COMPLETED | OUTPATIENT
Start: 2020-07-27 | End: 2020-07-27

## 2020-07-27 RX ORDER — HYDROMORPHONE HYDROCHLORIDE 1 MG/ML
.3-.5 INJECTION, SOLUTION INTRAMUSCULAR; INTRAVENOUS; SUBCUTANEOUS EVERY 5 MIN PRN
Status: DISCONTINUED | OUTPATIENT
Start: 2020-07-27 | End: 2020-07-27 | Stop reason: HOSPADM

## 2020-07-27 RX ORDER — SENNOSIDES A AND B 8.6 MG/1
1-3 TABLET, FILM COATED ORAL 2 TIMES DAILY PRN
Qty: 30 TABLET | Refills: 0 | Status: SHIPPED | OUTPATIENT
Start: 2020-07-27 | End: 2021-04-15

## 2020-07-27 RX ORDER — ONDANSETRON 2 MG/ML
4 INJECTION INTRAMUSCULAR; INTRAVENOUS EVERY 6 HOURS PRN
Status: DISCONTINUED | OUTPATIENT
Start: 2020-07-27 | End: 2020-08-05 | Stop reason: HOSPADM

## 2020-07-27 RX ORDER — NALOXONE HYDROCHLORIDE 0.4 MG/ML
.1-.4 INJECTION, SOLUTION INTRAMUSCULAR; INTRAVENOUS; SUBCUTANEOUS
Status: DISCONTINUED | OUTPATIENT
Start: 2020-07-27 | End: 2020-08-05 | Stop reason: HOSPADM

## 2020-07-27 RX ORDER — CEFAZOLIN SODIUM 1 G/3ML
1 INJECTION, POWDER, FOR SOLUTION INTRAMUSCULAR; INTRAVENOUS EVERY 8 HOURS
Status: COMPLETED | OUTPATIENT
Start: 2020-07-28 | End: 2020-07-28

## 2020-07-27 RX ORDER — FENTANYL CITRATE 50 UG/ML
INJECTION, SOLUTION INTRAMUSCULAR; INTRAVENOUS PRN
Status: DISCONTINUED | OUTPATIENT
Start: 2020-07-27 | End: 2020-07-27

## 2020-07-27 RX ORDER — FENTANYL CITRATE 0.05 MG/ML
25-50 INJECTION, SOLUTION INTRAMUSCULAR; INTRAVENOUS
Status: CANCELLED | OUTPATIENT
Start: 2020-07-27

## 2020-07-27 RX ORDER — HYDROMORPHONE HYDROCHLORIDE 1 MG/ML
.3-.5 INJECTION, SOLUTION INTRAMUSCULAR; INTRAVENOUS; SUBCUTANEOUS EVERY 10 MIN PRN
Status: DISCONTINUED | OUTPATIENT
Start: 2020-07-27 | End: 2020-07-27 | Stop reason: HOSPADM

## 2020-07-27 RX ORDER — ONDANSETRON 2 MG/ML
INJECTION INTRAMUSCULAR; INTRAVENOUS PRN
Status: DISCONTINUED | OUTPATIENT
Start: 2020-07-27 | End: 2020-07-27

## 2020-07-27 RX ADMIN — HYDROMORPHONE HYDROCHLORIDE 0.25 MG: 1 INJECTION, SOLUTION INTRAMUSCULAR; INTRAVENOUS; SUBCUTANEOUS at 13:53

## 2020-07-27 RX ADMIN — ROCURONIUM BROMIDE 10 MG: 10 INJECTION INTRAVENOUS at 15:25

## 2020-07-27 RX ADMIN — SUGAMMADEX 200 MG: 100 INJECTION, SOLUTION INTRAVENOUS at 15:55

## 2020-07-27 RX ADMIN — HYDROMORPHONE HYDROCHLORIDE 0.3 MG: 1 INJECTION, SOLUTION INTRAMUSCULAR; INTRAVENOUS; SUBCUTANEOUS at 20:58

## 2020-07-27 RX ADMIN — DEXMEDETOMIDINE HYDROCHLORIDE 8 MCG: 100 INJECTION, SOLUTION INTRAVENOUS at 14:08

## 2020-07-27 RX ADMIN — ALBUMIN HUMAN: 0.05 INJECTION, SOLUTION INTRAVENOUS at 14:37

## 2020-07-27 RX ADMIN — LIDOCAINE HYDROCHLORIDE 0.3 ML: 10 INJECTION, SOLUTION EPIDURAL; INFILTRATION; INTRACAUDAL; PERINEURAL at 12:14

## 2020-07-27 RX ADMIN — HYDROMORPHONE HYDROCHLORIDE 0.5 MG: 1 INJECTION, SOLUTION INTRAMUSCULAR; INTRAVENOUS; SUBCUTANEOUS at 22:55

## 2020-07-27 RX ADMIN — ROCURONIUM BROMIDE 40 MG: 10 INJECTION INTRAVENOUS at 13:33

## 2020-07-27 RX ADMIN — FENTANYL CITRATE 50 MCG: 50 INJECTION, SOLUTION INTRAMUSCULAR; INTRAVENOUS at 13:44

## 2020-07-27 RX ADMIN — CEFAZOLIN 1 G: 1 INJECTION, POWDER, FOR SOLUTION INTRAMUSCULAR; INTRAVENOUS at 15:41

## 2020-07-27 RX ADMIN — ROCURONIUM BROMIDE 10 MG: 10 INJECTION INTRAVENOUS at 14:52

## 2020-07-27 RX ADMIN — DEXAMETHASONE SODIUM PHOSPHATE 4 MG: 4 INJECTION, SOLUTION INTRA-ARTICULAR; INTRALESIONAL; INTRAMUSCULAR; INTRAVENOUS; SOFT TISSUE at 14:04

## 2020-07-27 RX ADMIN — CEFAZOLIN SODIUM 2 G: 2 INJECTION, SOLUTION INTRAVENOUS at 13:45

## 2020-07-27 RX ADMIN — POTASSIUM CHLORIDE, DEXTROSE MONOHYDRATE AND SODIUM CHLORIDE: 150; 5; 450 INJECTION, SOLUTION INTRAVENOUS at 18:28

## 2020-07-27 RX ADMIN — PROPOFOL 170 MG: 10 INJECTION, EMULSION INTRAVENOUS at 13:33

## 2020-07-27 RX ADMIN — HYDROMORPHONE HYDROCHLORIDE 0.5 MG: 1 INJECTION, SOLUTION INTRAMUSCULAR; INTRAVENOUS; SUBCUTANEOUS at 16:49

## 2020-07-27 RX ADMIN — PHENYLEPHRINE HYDROCHLORIDE 50 MCG: 10 INJECTION INTRAVENOUS at 14:27

## 2020-07-27 RX ADMIN — SODIUM CHLORIDE, POTASSIUM CHLORIDE, SODIUM LACTATE AND CALCIUM CHLORIDE: 600; 310; 30; 20 INJECTION, SOLUTION INTRAVENOUS at 13:33

## 2020-07-27 RX ADMIN — MIDAZOLAM 2 MG: 1 INJECTION INTRAMUSCULAR; INTRAVENOUS at 13:20

## 2020-07-27 RX ADMIN — HYDROMORPHONE HYDROCHLORIDE 0.25 MG: 1 INJECTION, SOLUTION INTRAMUSCULAR; INTRAVENOUS; SUBCUTANEOUS at 14:00

## 2020-07-27 RX ADMIN — HYDROMORPHONE HYDROCHLORIDE 0.25 MG: 1 INJECTION, SOLUTION INTRAMUSCULAR; INTRAVENOUS; SUBCUTANEOUS at 14:13

## 2020-07-27 RX ADMIN — ONDANSETRON 4 MG: 2 INJECTION INTRAMUSCULAR; INTRAVENOUS at 15:42

## 2020-07-27 RX ADMIN — ROCURONIUM BROMIDE 10 MG: 10 INJECTION INTRAVENOUS at 13:43

## 2020-07-27 RX ADMIN — LIDOCAINE HYDROCHLORIDE 100 MG: 20 INJECTION, SOLUTION INFILTRATION; PERINEURAL at 13:33

## 2020-07-27 RX ADMIN — DEXMEDETOMIDINE HYDROCHLORIDE 8 MCG: 100 INJECTION, SOLUTION INTRAVENOUS at 13:47

## 2020-07-27 RX ADMIN — ACETAMINOPHEN 975 MG: 325 TABLET, FILM COATED ORAL at 11:39

## 2020-07-27 RX ADMIN — KETOROLAC TROMETHAMINE 30 MG: 30 INJECTION, SOLUTION INTRAMUSCULAR at 22:52

## 2020-07-27 RX ADMIN — ROCURONIUM BROMIDE 10 MG: 10 INJECTION INTRAVENOUS at 14:25

## 2020-07-27 RX ADMIN — FENTANYL CITRATE 50 MCG: 50 INJECTION, SOLUTION INTRAMUSCULAR; INTRAVENOUS at 13:33

## 2020-07-27 RX ADMIN — PROPOFOL 40 MG: 10 INJECTION, EMULSION INTRAVENOUS at 13:44

## 2020-07-27 RX ADMIN — PROPOFOL 30 MG: 10 INJECTION, EMULSION INTRAVENOUS at 13:40

## 2020-07-27 RX ADMIN — CEFAZOLIN 1 G: 1 INJECTION, POWDER, FOR SOLUTION INTRAMUSCULAR; INTRAVENOUS at 23:33

## 2020-07-27 RX ADMIN — SODIUM CHLORIDE, POTASSIUM CHLORIDE, SODIUM LACTATE AND CALCIUM CHLORIDE: 600; 310; 30; 20 INJECTION, SOLUTION INTRAVENOUS at 12:15

## 2020-07-27 RX ADMIN — HYDROMORPHONE HYDROCHLORIDE 0.3 MG: 1 INJECTION, SOLUTION INTRAMUSCULAR; INTRAVENOUS; SUBCUTANEOUS at 18:54

## 2020-07-27 RX ADMIN — KETOROLAC TROMETHAMINE 30 MG: 30 INJECTION, SOLUTION INTRAMUSCULAR at 16:52

## 2020-07-27 RX ADMIN — PROPOFOL 30 MCG/KG/MIN: 10 INJECTION, EMULSION INTRAVENOUS at 13:35

## 2020-07-27 ASSESSMENT — MIFFLIN-ST. JEOR: SCORE: 1298.68

## 2020-07-27 ASSESSMENT — ACTIVITIES OF DAILY LIVING (ADL): ADLS_ACUITY_SCORE: 16

## 2020-07-27 NOTE — ANESTHESIA CARE TRANSFER NOTE
Patient: Jefe Blanc    Procedure(s):  DIAGNOSTIC LAPAROSCOPY, EXPLORATORY LAPAROTOMY, SUPRACERVICAL HYSTERECTOMY, RIGHT SALPINGO-OOPHORECTOMY, OMENTECTOMY  RIGHT HEMICOLECTOMY    Diagnosis: Ovarian cancer on left (H) [C56.2]  Diagnosis Additional Information: No value filed.    Anesthesia Type:   General     Note:  Airway :Face Mask  Patient transferred to:PACU  Comments: Neuromuscular blockade reversed after TOF 2/4, spontaneous respirations, adequate tidal volumes, followed commands to voice, oropharynx suctioned with soft flexible catheter, extubated atraumatically, extubated with suction, airway patent after extubation.  Oxygen via facemask at 6 liters per minute to PACU. Oxygen tubing connected to wall O2 in PACU, SpO2, NiBP, and EKG monitors and alarms on and functioning, Radha Hugger warmer connected to patient gown, report on patient's clinical status given to PACU RN, RN questions answered.   Handoff Report: Identifed the Patient, Identified the Reponsible Provider, Reviewed the pertinent medical history, Discussed the surgical course, Reviewed Intra-OP anesthesia mangement and issues during anesthesia, Set expectations for post-procedure period and Allowed opportunity for questions and acknowledgement of understanding      Vitals: (Last set prior to Anesthesia Care Transfer)    CRNA VITALS  7/27/2020 1549 - 7/27/2020 1634      7/27/2020             Pulse:  66    SpO2:  100 %    Resp Rate (observed): 8    Resp Rate (set):  10                Electronically Signed By: TORRES Kwon CRNA  July 27, 2020  4:34 PM

## 2020-07-27 NOTE — BRIEF OP NOTE
[facility]    Brief Operative Note    Pre-operative diagnosis: Ovarian cancer on left (H) [C56.2]  Post-operative diagnosis Appendiceal cancer    Procedure: Procedure(s):  DIAGNOSTIC LAPAROSCOPY, EXPLORATORY LAPAROTOMY, SUPRACERVICAL HYSTERECTOMY, RIGHT SALPINGO-OOPHORECTOMY, OMENTECTOMY  RIGHT HEMICOLECTOMY  Surgeon: Surgeon(s) and Role:  Panel 1:     * Eliane Nicole MD - Primary  Panel 2:     * Eliane Nicole MD - Primary     * Prakash Ernst MD - Assisting  Anesthesia: General   Estimated blood loss: Less than 50 ml  Drains: None  Specimens:   ID Type Source Tests Collected by Time Destination   A : SUBCUTANEOUS NODULE Tissue Other SURGICAL PATHOLOGY EXAM Eliane Nicole MD 7/27/2020  2:28 PM    B : RIGHT PELVIC PERITONEUM Tissue Other SURGICAL PATHOLOGY EXAM Eliane Nicole MD 7/27/2020  2:30 PM    C : RIGHT FALLOPIAN TUBE AND OVARY Tissue Fallopian Tube and Ovary, Right SURGICAL PATHOLOGY EXAM Eliane Nicole MD 7/27/2020  2:32 PM    D : UTERUS Tissue Uterus SURGICAL PATHOLOGY EXAM Eliane Nicole MD 7/27/2020  2:48 PM    E : OMENTUM Tissue Omentum SURGICAL PATHOLOGY EXAM Eliane Nicole MD 7/27/2020  3:08 PM    F : SMALL BOWEL MESENTERIC NODULE Tissue Other SURGICAL PATHOLOGY EXAM Eliane Nicole MD 7/27/2020  3:21 PM    G : RIGHT COLON  Tissue Large Intestine, Right/Ascending SURGICAL PATHOLOGY EXAM Eliane Nicole MD 7/27/2020  3:24 PM    Findings: Multiple tumor plaques on pelvic peritoneum, cervix, bowel, mesentery, omental caking, primary tumor to appendix which was stuck to the right ovary and tube.   Complications: None.  Implants: * No implants in log *

## 2020-07-27 NOTE — ANESTHESIA POSTPROCEDURE EVALUATION
Patient: Jefe Blanc    Procedure(s):  DIAGNOSTIC LAPAROSCOPY, EXPLORATORY LAPAROTOMY, SUPRACERVICAL HYSTERECTOMY, RIGHT SALPINGO-OOPHORECTOMY, OMENTECTOMY  RIGHT HEMICOLECTOMY    Diagnosis:Ovarian cancer on left (H) [C56.2]  Diagnosis Additional Information: No value filed.    Anesthesia Type:  General    Note:  Anesthesia Post Evaluation    Patient location during evaluation: PACU  Patient participation: Able to fully participate in evaluation  Level of consciousness: awake  Pain management: adequate  Airway patency: patent  Cardiovascular status: acceptable  Respiratory status: acceptable  Hydration status: acceptable  PONV: none             Last vitals:  Vitals:    07/27/20 1148   BP: (!) 112/91   Pulse: 67   Resp: 14   Temp: 36.7  C (98  F)   SpO2: 100%         Electronically Signed By: Enio Jones MD  July 27, 2020  4:50 PM

## 2020-07-27 NOTE — OR NURSING
Patient extremely somulent upon arrival to PACU.  Oral airway removed by CRNA.  Patient's /120 and  pulse is bradycardic in the 50's. When patient is stimulated to awaken she c/o of a great deal of pain, but is so somulent when not stimulated to breathe.  Dr. Sitton called to bedside to assess patient.  Dr. Jones states to give patient .5 mg of Dilaudid.

## 2020-07-27 NOTE — ADDENDUM NOTE
Addendum  created 07/27/20 1718 by Howard Martínez MD    Order list changed, Order sets accessed

## 2020-07-27 NOTE — ANESTHESIA PREPROCEDURE EVALUATION
Anesthesia Pre-Procedure Evaluation    Patient: Jefe Blanc   MRN: [id number] : 1980          Preoperative Diagnosis: Ovarian cancer on left (H) [C56.2]    Procedure(s):  ROBOT-ASSISTED TOTAL LAPAROSCOPIC HYSTERECTOMY, RIGHT SALPINGO-OOPHORECTOMY, OMENTECTOMY, BILATERAL PARAORTIC LYMPHADENECTOMY, POSSIBLE BILATERAL PELVIC LYMPHADENECTOMY,  POSSIBLE LAPAROTOMY,  POSSIBLE BOWEL RESECTION    Past Medical History:   Diagnosis Date     Diverticulitis     of esophagus     Ovarian cyst      Past Surgical History:   Procedure Laterality Date     ADENOIDECTOMY       ENT SURGERY      tubes     LAPAROSCOPIC CYSTECTOMY OVARIAN (BENIGN) Bilateral 2020    Procedure: Diagnostic laparoscopy, possible ovarian torsion, bilateral ovarian cysts.;  Surgeon: Odilia Nieves MD;  Location: [facility] OR     post partum tubal ligation         Anesthesia Evaluation     . Pt has had prior anesthetic.     History of anesthetic complications (swelling around lips from ET tube)          ROS/MED HX    ENT/Pulmonary:  - neg pulmonary ROS    (-) sleep apnea and recent URI   Neurologic:  - neg neurologic ROS     Cardiovascular:  - neg cardiovascular ROS       METS/Exercise Tolerance:     Hematologic:         Musculoskeletal:         GI/Hepatic:  - neg GI/hepatic ROS      (-) GERD   Renal/Genitourinary:  - ROS Renal section negative       Endo:  - neg endo ROS    (-) Type II DM and thyroid disease   Psychiatric:         Infectious Disease:         Malignancy:   (+) Malignancy History of Other  Other CA ovarian cancer diagnosed 2020 Active status post Surgery         Other: Comment: Ovarian torsion s/p surgery 2020                         Physical Exam  Normal systems: cardiovascular, pulmonary and dental    Airway   Mallampati: II  TM distance: >3 FB  Neck ROM: full    Dental   Comment: Prominent frontal incisors    Cardiovascular   Rhythm and rate: regular and normal      Pulmonary    breath sounds clear to  "auscultation            Lab Results   Component Value Date    WBC 4.0 06/12/2020    HGB 12.2 06/12/2020    HCT 36.6 06/12/2020     06/12/2020     06/12/2020    POTASSIUM 3.6 06/12/2020    CHLORIDE 109 06/12/2020    CO2 25 06/12/2020    BUN 8 06/12/2020    CR 0.76 06/12/2020    GLC 83 06/12/2020    IRON 8.9 06/12/2020    ALBUMIN 3.6 06/12/2020    PROTTOTAL 7.3 06/12/2020    ALT 21 06/12/2020    AST 20 06/12/2020    ALKPHOS 56 06/12/2020    BILITOTAL 0.6 06/12/2020       Preop Vitals  BP Readings from Last 3 Encounters:   06/13/20 120/71    Pulse Readings from Last 3 Encounters:   06/12/20 68      Resp Readings from Last 3 Encounters:   06/13/20 16    SpO2 Readings from Last 3 Encounters:   06/13/20 98%      Temp Readings from Last 1 Encounters:   06/13/20 36.6  C (97.9  F) (Oral)    Ht Readings from Last 1 Encounters:   06/12/20 1.6 m (5' 3\")      Wt Readings from Last 1 Encounters:   06/12/20 68.9 kg (152 lb)    Estimated body mass index is 26.93 kg/m  as calculated from the following:    Height as of 6/12/20: 1.6 m (5' 3\").    Weight as of 6/12/20: 68.9 kg (152 lb).       Anesthesia Plan      History & Physical Review  History and physical reviewed and following examination; no interval change.    ASA Status:  2 .    NPO Status:  > 8 hours    Plan for General with Propofol induction. Maintenance will be Balanced.    PONV prophylaxis:  Ondansetron (or other 5HT-3) and Dexamethasone or Solumedrol  Background propofol         Postoperative Care  Postoperative pain management:  Multi-modal analgesia.      Consents  Anesthetic plan, risks, benefits and alternatives discussed with:  Patient..                 Mata Holguin MD  "

## 2020-07-28 LAB
ANION GAP SERPL CALCULATED.3IONS-SCNC: 4 MMOL/L (ref 3–14)
BASOPHILS # BLD AUTO: 0 10E9/L (ref 0–0.2)
BASOPHILS NFR BLD AUTO: 0 %
BUN SERPL-MCNC: 9 MG/DL (ref 7–30)
CALCIUM SERPL-MCNC: 8.1 MG/DL (ref 8.5–10.1)
CHLORIDE SERPL-SCNC: 108 MMOL/L (ref 94–109)
CO2 SERPL-SCNC: 24 MMOL/L (ref 20–32)
CREAT SERPL-MCNC: 0.74 MG/DL (ref 0.52–1.04)
DIFFERENTIAL METHOD BLD: ABNORMAL
EOSINOPHIL # BLD AUTO: 0 10E9/L (ref 0–0.7)
EOSINOPHIL NFR BLD AUTO: 0 %
ERYTHROCYTE [DISTWIDTH] IN BLOOD BY AUTOMATED COUNT: 14.7 % (ref 10–15)
GFR SERPL CREATININE-BSD FRML MDRD: >90 ML/MIN/{1.73_M2}
GLUCOSE SERPL-MCNC: 171 MG/DL (ref 70–99)
HCT VFR BLD AUTO: 33 % (ref 35–47)
HGB BLD-MCNC: 10.8 G/DL (ref 11.7–15.7)
IMM GRANULOCYTES # BLD: 0 10E9/L (ref 0–0.4)
IMM GRANULOCYTES NFR BLD: 0.2 %
LYMPHOCYTES # BLD AUTO: 0.8 10E9/L (ref 0.8–5.3)
LYMPHOCYTES NFR BLD AUTO: 8.9 %
MCH RBC QN AUTO: 26.9 PG (ref 26.5–33)
MCHC RBC AUTO-ENTMCNC: 32.7 G/DL (ref 31.5–36.5)
MCV RBC AUTO: 82 FL (ref 78–100)
MONOCYTES # BLD AUTO: 0.7 10E9/L (ref 0–1.3)
MONOCYTES NFR BLD AUTO: 7.7 %
NEUTROPHILS # BLD AUTO: 7.3 10E9/L (ref 1.6–8.3)
NEUTROPHILS NFR BLD AUTO: 83.2 %
NRBC # BLD AUTO: 0 10*3/UL
NRBC BLD AUTO-RTO: 0 /100
PLATELET # BLD AUTO: 234 10E9/L (ref 150–450)
POTASSIUM SERPL-SCNC: 4.7 MMOL/L (ref 3.4–5.3)
RBC # BLD AUTO: 4.01 10E12/L (ref 3.8–5.2)
SODIUM SERPL-SCNC: 136 MMOL/L (ref 133–144)
WBC # BLD AUTO: 8.8 10E9/L (ref 4–11)

## 2020-07-28 PROCEDURE — 12000000 ZZH R&B MED SURG/OB

## 2020-07-28 PROCEDURE — 25000128 H RX IP 250 OP 636: Performed by: NURSE PRACTITIONER

## 2020-07-28 PROCEDURE — 80048 BASIC METABOLIC PNL TOTAL CA: CPT | Performed by: NURSE PRACTITIONER

## 2020-07-28 PROCEDURE — 36415 COLL VENOUS BLD VENIPUNCTURE: CPT | Performed by: NURSE PRACTITIONER

## 2020-07-28 PROCEDURE — 85025 COMPLETE CBC W/AUTO DIFF WBC: CPT | Performed by: NURSE PRACTITIONER

## 2020-07-28 PROCEDURE — 25800030 ZZH RX IP 258 OP 636: Performed by: NURSE PRACTITIONER

## 2020-07-28 RX ORDER — KETOROLAC TROMETHAMINE 30 MG/ML
30 INJECTION, SOLUTION INTRAMUSCULAR; INTRAVENOUS
Status: DISCONTINUED | OUTPATIENT
Start: 2020-07-28 | End: 2020-08-05 | Stop reason: HOSPADM

## 2020-07-28 RX ADMIN — POTASSIUM CHLORIDE, DEXTROSE MONOHYDRATE AND SODIUM CHLORIDE: 150; 5; 450 INJECTION, SOLUTION INTRAVENOUS at 04:13

## 2020-07-28 RX ADMIN — CEFAZOLIN 1 G: 1 INJECTION, POWDER, FOR SOLUTION INTRAMUSCULAR; INTRAVENOUS at 08:22

## 2020-07-28 RX ADMIN — ONDANSETRON 4 MG: 2 INJECTION INTRAMUSCULAR; INTRAVENOUS at 08:23

## 2020-07-28 RX ADMIN — PROCHLORPERAZINE EDISYLATE 10 MG: 5 INJECTION INTRAMUSCULAR; INTRAVENOUS at 04:13

## 2020-07-28 RX ADMIN — HYDROMORPHONE HYDROCHLORIDE 0.3 MG: 1 INJECTION, SOLUTION INTRAMUSCULAR; INTRAVENOUS; SUBCUTANEOUS at 23:05

## 2020-07-28 RX ADMIN — ONDANSETRON 4 MG: 2 INJECTION INTRAMUSCULAR; INTRAVENOUS at 00:39

## 2020-07-28 RX ADMIN — HYDROMORPHONE HYDROCHLORIDE 0.3 MG: 1 INJECTION, SOLUTION INTRAMUSCULAR; INTRAVENOUS; SUBCUTANEOUS at 18:20

## 2020-07-28 RX ADMIN — ENOXAPARIN SODIUM 40 MG: 40 INJECTION SUBCUTANEOUS at 10:36

## 2020-07-28 RX ADMIN — POTASSIUM CHLORIDE, DEXTROSE MONOHYDRATE AND SODIUM CHLORIDE: 150; 5; 450 INJECTION, SOLUTION INTRAVENOUS at 15:03

## 2020-07-28 RX ADMIN — KETOROLAC TROMETHAMINE 30 MG: 30 INJECTION, SOLUTION INTRAMUSCULAR at 14:00

## 2020-07-28 RX ADMIN — HYDROMORPHONE HYDROCHLORIDE 0.5 MG: 1 INJECTION, SOLUTION INTRAMUSCULAR; INTRAVENOUS; SUBCUTANEOUS at 04:12

## 2020-07-28 RX ADMIN — PROCHLORPERAZINE EDISYLATE 10 MG: 5 INJECTION INTRAMUSCULAR; INTRAVENOUS at 13:59

## 2020-07-28 ASSESSMENT — MIFFLIN-ST. JEOR: SCORE: 1356.13

## 2020-07-28 ASSESSMENT — ACTIVITIES OF DAILY LIVING (ADL)
ADLS_ACUITY_SCORE: 16

## 2020-07-28 NOTE — PROGRESS NOTES
[facility]  PROGRESS NOTE    July 28, 2020  Post-op Day # 1 s/p diagnostic laparoscopy, supracervical hysterectomy, RSO, omentectomy, tumor debulking (Dr Nicole), right hemicoloectomy (Dr. 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    SUBJECTIVE:  Patient sitting up in bed on arrival. Nauseous this AM. Was out of bed yesterday. No flatus or stool yet. Pain controlled.     OBJECTIVE:  Temp:  [96  F (35.6  C)-98  F (36.7  C)] 96.7  F (35.9  C)  Pulse:  [56-70] 70  Heart Rate:  [54-83] 83  Resp:  [9-29] 14  BP: (103-184)/() 103/64  SpO2:  [95 %-100 %] 98 %    Intake/Output Summary (Last 24 hours) at 7/28/2020 0914  Last data filed at 7/28/2020 0800  Gross per 24 hour   Intake 3550 ml   Output 850 ml   Net 2700 ml       GENERAL:  Awake, alert, no acute distress, bains catheter with yellow output  HEAD: Normocephalic atraumatic  SCLERA: Anicteric  EXTREMITIES: Warm and well perfused  ABDOMEN:  Soft, appropriately tender, non-distended. No guarding, rigidity, or peritoneal signs. Abdominal binder in place.   INCISION:  covered with bangade    LABS:  Lab Results   Component Value Date    WBC 8.8 07/28/2020     Lab Results   Component Value Date    HGB 10.8 07/28/2020     Lab Results   Component Value Date    HCT 33.0 07/28/2020     Lab Results   Component Value Date     07/28/2020     Last Basic Metabolic Panel:  Lab Results   Component Value Date     07/28/2020      Lab Results   Component Value Date    POTASSIUM 4.7 07/28/2020     Lab Results   Component Value Date    CHLORIDE 108 07/28/2020     Lab Results   Component Value Date    IRON 8.1 07/28/2020     Lab Results   Component Value Date    CO2 24 07/28/2020     Lab Results   Component Value Date    BUN 9 07/28/2020     Lab Results   Component Value Date    CR 0.74 07/28/2020     Lab Results   Component Value Date     07/28/2020       ASSESSMENT/PLAN: 40 year old female POD#1 s/p diagnostic laparoscopy, supracervical  hysterectomy, RSO, omentectomy, tumor debulking (Dr Nicole) and right hemicoloectomy (Dr. 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 AVSS. Labs stable. AROBF. OOB and ambulate. Await path.     1. NPO diet given nausea this AM. Sips of clears and ice chips OK. If nausea improves, can have CLD this afternoon.  2. PRN pain medication  3. Continue IVF  4. Bryan per primary  5. OOB, ambulate  6. Await path   7. Lovenox and SCDs for ppx  8. AROBF  9. Will discuss with Dr Ernst     For questions/paging, please contact the [facility] office at [phone].    Maryse Zuniga PA-C  [facility]  Phone: [phone]    ADDENDUM 14:30 - Page of 100cc BRBPR on comode this afternoon. AVSS. No dizziness or lightheadedness. No increase in symptoms. Will hold Lovenox and recheck hgb in the morning. Monitor symptoms.   Megan Barahona PA-C on 7/28/2020 at 2:52 PM    [facility]S Staff.  Seen and examined with Megan Barahona.  Agree with above.    Reviewed op findings.  Await return of bowel function.  Hold lovenox due to bloody BM.    I performed a history and physical examination of the patient and discussed their management with the physician assistant. I reviewed the physician assistants note and agree with the documented findings and plan of care.     Prakash Ernst MD  [facility]  [phone] (office)  [phone] (pager)  [email or web address]

## 2020-07-28 NOTE — PROGRESS NOTES
A&O, lethargic. VSS, on RA. Up assist of 1 Gb and walker. Abdominal incision, dressing reinforced. 2 lap sites, C/D/I. Abdominal binder in place. No audible BS, flatus (-). C/O pain, ice applied, IV dilaudid given, effective. C/O nausea zofran given, not effective, compazine effective. Bryan in place. Slept between cares.

## 2020-07-28 NOTE — PROGRESS NOTES
"[facility]  Hospitalist Progress Note          Assessment and Plan:     Ovarian cancer, bilateral goblet cell mucinous carcinoma: POD 1 Diagnostic laparoscopy, supracervical hysterectomy, RSO, omentectomy, tumor debulking, right hemicoloectomy (Dr. 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  Discussed intra-op findings, procedures and anticipated stay with Jefe this AM.  I discussed that she would need adjuvant chemotherapy of some form which we'll address once pathology back.  Pain reasonable controlled.  OOB once thus far.  Diet per colorectal surgery.    DVT Prophylaxis:  On lovenox 40 mg daily.  Will continue for 4 wks post op.      Disposition:  May benefit from discharge planning.  Has 5 children (3 living with her).  She is recently  and living with her children at a friend's home.  Anticipate discharge end of week.                 Interval History:   Pain moderately controlled              Medications:   I have reviewed this patient's current medications               Physical Exam:   Blood pressure 103/64, pulse 70, temperature 96.7  F (35.9  C), temperature source Oral, resp. rate 14, height 1.6 m (5' 3\"), weight 70.7 kg (155 lb 13.8 oz), last menstrual period 2020, SpO2 98 %.        Vital Sign Ranges  Temperature Temp  Av.1  F (36.2  C)  Min: 96  F (35.6  C)  Max: 98  F (36.7  C)   Blood pressure Systolic (24hrs), Av , Min:103 , Max:184        Diastolic (24hrs), Av, Min:64, Max:123      Pulse Pulse  Av  Min: 56  Max: 70   Respirations Resp  Av.3  Min: 9  Max: 29   Pulse oximetry SpO2  Av.9 %  Min: 95 %  Max: 100 %         Intake/Output Summary (Last 24 hours) at 2020 0836  Last data filed at 2020 0800  Gross per 24 hour   Intake 3550 ml   Output 850 ml   Net 2700 ml       Lungs:   Clear anteriorly     Cardiovascular:   normal apical pulses      Abdomen:   No BS heard this AM, " dressing intact with serosanguinous drainage     Musculoskeletal:   no lower extremity pitting edema present                Data:   All laboratory data reviewed

## 2020-07-28 NOTE — PLAN OF CARE
A&Ox4, very lethargic post-op. C/o moderate/severe incisional pain, managed w/ IV dilaudid & Toradol. Capno WNL. Incentive spirometer encouraged. Midline incision w/ serosanguinous drainage, reinforced x1, ice applied & abdominal binder in place. 2 lap sites, dressing CDI. -BS, -gas. Strict NPO. No vaginal drainage, pad in place. Bryan patent, adequate UOP. PIV infusing D5 1/2 NS 20K @100ml/hr. Stood at bedside & ambulated in room this shift, tolerated. Discharge pending recovery.

## 2020-07-28 NOTE — PROVIDER NOTIFICATION
MD Notification    Notified Person: MD    Notified Person Name: Megan, colorectal surgery, PAC     Notification Date/Time:7/28/2020 2:33 PM     Notification Interaction:  Purpose of Notification:  Hematochezia      Orders Received: continue to watch, call If new symptoms    Comments:

## 2020-07-28 NOTE — PLAN OF CARE
Pt slept most of shift. Up with 2 to beside. Pt felt hot/nauseated and was laid back in bed. Ice to incision. Capno.  Continue IV abx.

## 2020-07-28 NOTE — PROVIDER NOTIFICATION
Prescriber Notification Note    The pharmacist has communicated with this patient's provider regarding a concern or therapy recommendation.    Notified Person: Dr. Ernst  Date/Time of Notification: 7/28 18:45  Interaction: phone  Concern/Recommendation: Clarified continue ketorolac prn with bloody BM & d/c lovenox - order to put ketorolac on hold.

## 2020-07-28 NOTE — OP NOTE
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      PROCEDURE PERFORMED:  Open right colectomy.       STAFF SURGEON:  Eliane Nicole MD      CO-SURGEON:  MD Sujata      INDICATIONS FOR PROCEDURE:  Jefe is a 40-year-old female who recently had some complaints of pelvic pain and vaginal discharge.  Initial evaluation was concerning for an ovarian torsion and she was taken to the operating room for a laparoscopic left ovarian detorsion and salpingo-oophorectomy.  Washings as well as a solid right ovarian mass cystectomy were performed.  Pathology demonstrated a goblet cell adenocarcinoma favoring metastatic disease.  She apparently underwent an upper endoscopy and a colonoscopy.  She is being brought to the operating room today by Dr. Nicole for operative staging right salpingo-oophorectomy and hysterectomy.  The preoperative counseling was done by Dr. Nicole.  I was called to the operating room once a suspected appendiceal mass was identified for intraoperative consultation and a right colectomy.      DESCRIPTION OF PROCEDURE:  As I entered the operating room, the patient was in the supine position.  General anesthesia had been induced.  The patient was prepped and draped in the lithotomy position.  Dr. Nicole had performed an exploratory laparotomy.  She had performed a supracervical hysterectomy with a right-sided salpingo-oophorectomy.  She identified operative findings consistent with carcinomatosis with goblet cell carcinoma.  She had not sent any specimens for frozen pathology.  She identified that the appendix was adjacent to the right ovary and was quite thickened and firm and suspicious as the primary.  The rest of the colon appeared normal.  I scrubbed.  I confirmed the findings.  I did run the small bowel.  There were numerous tumor deposits along  the mesentery as well as 2 within the small bowel somewhat distally.  The appendix was quite firm and thickened and enlarged.  There was a mass in the distal third of the appendix measuring approximately 3 cm in size.  The liver appeared grossly normal.  Some of the retroperitoneal surfaces on the left side had tumor deposits.  The stomach and transverse colon appeared normal.  I then proceeded to do a right colectomy for resection of the appendix as well as radha staging, given that there were enlarged lymph nodes along the ileocolic pedicle that were divided.  Dr. Nicole had already performed some of the lateral dissection from the right colectomy.  The lateral attachments including the hepatic flexure were incised to mobilize the colon and the mesocolon.  The duodenum was swept posteriorly.  A point for proximal transection was chosen.  There was a nodule close to the ileocecal valve, which was incorporated in the specimen.  The small bowel was isolated with electrocautery and then transected with 100 mm blue load LEEANN stapler.  The mesentery was taken in radial fashion up to the level of the proximal ileocolic artery.  Great care was taken to avoid injury to the duodenum.  We were cautious to ensure that we had resected this enlarged node along the ileocolic pedicle.  The ileocolic pedicle was ligated twice and then transected with the LigaSure device.  Similarly, a point for distal transection was chosen in the proximal transverse colon.  The mesentery was taken in a radial fashion, again with great care taken to avoid injury to the duodenum.  The bowel was isolated with a vessel sealing device and the colon was then transected with a 100 mm blue load LEEANN stapler.  Specimen was sent off as right colon.  The bowel was lined up for anastomosis.  Antimesenteric enterotomies were made.  A side-to-side functional end-to-end anastomosis was created with a single firing of a blue load 100 mm LEEANN stapler.  The common  enterotomy was closed with a perpendicular application of a blue load 100 mm LEEANN stapler.  Great care was taken to get below the end staple lines of the colon and the small bowel, so as to have only 1 crossing staple line.  Small points of bleeding were ligated with 3-0 Vicryl sutures.  The anastomosis at several points was reinforced with interrupted 3-0 Vicryl sutures.  The anastomosis was found to be widely patent, well vascularized and without any tension.  At this point, I turned the case back over to Dr. Nicole.  Please see her note for full details.         ADORE MARR MD             D: 2020   T: 2020   MT: JENNIFER      Name:     SHANIQUE KUNZ   MRN:      [id number]        Account:        [id number]   :      1980           Procedure Date: 2020      Document: [id number]       cc: Eliane Montero MD

## 2020-07-28 NOTE — OP NOTE
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      PROCEDURE:  Diagnostic laparoscopy, exploratory laparotomy, supracervical hysterectomy, right salpingo-oophorectomy, omentectomy and tumor debulking.      INDICATIONS FOR THE PROCEDURE:  The patient is a 40-year-old female who presented to [facility] Emergency Room on 10/17/2019 complaining of pelvic pain and vaginal discharge of 1 week's duration.  A pelvic ultrasound revealed 2 small fibroids in endometrial lining of 8 mm, an IUD in good position, and right ovary was 4.6 x 5.5 x 3.8 with a 2.1 cm complex cyst with debris in the right ovary; left ovary was normal.  On 06/12/2020 she again presented to the [facility] Emergency Room with 1-day history of left lower quadrant cramping.  She was found to have diffuse lower abdominal tenderness with homogeneous hypoechoic lesions measuring 1.6 x 1.3 and a larger homogeneous hyperechoic lesion measuring 3 x 3.7 x 4 cm with internal vascularity.  The left ovary measured 6.8 x 7.3 x 4.2 and with a homogeneous hyperechoic lobulated mass.  She had a few uterine fibroids.  The myometrium was heterogeneous and the endometrium was ill-defined.  She was taken to the OR by Dr. Keiko Nieves and Dr. Arelis Montero on 06/12/2020.  She underwent a laparoscopic left ovarian detorsion, left salpingo-oophorectomy, right ovarian cystectomy, removal of right solid ovarian mass and washings; cyst on the right side had ruptured.  Pathology showed a left ovary with goblet cell adenocarcinoma favoring metastatic; right ovary mass with goblet cell adenocarcinoma, favor metastatic and the right ovarian cyst had no evidence of malignancy.  Washings were negative.  CEA was subsequently obtained and was 1.2 ng/mL.  CA-125 was 57 units/mL.  A PET CT scan was  obtained 06/29/2020 and revealed a left ovarian cystic lesion measuring 5.1 with no significant hypermetabolic activity.  There was a 2.1 cm hypermetabolic focus within the endometrium near the fundus that could be physiologic and the IUD was in good position.  Endoscopy on 06/30/2020 was normal and colonoscopy was reportedly normal.  She was brought to the operating room today for definitive hysterectomy, RSO and staging.      FINDINGS:  The patient was found, on diagnostic laparoscopy, to have plaques of tumor on the abdominal wall peritoneum in the pelvis.  She had a tumor along the pericolic gutters and she had a fairly sizable omental cake.  Her right remaining ovary was abnormal in size and was solid and cystic.  There was tumor behind the uterus in the cul-de-sac.  She was found to have a grossly abnormal appendix that was lying on the pelvic sidewall next to the right ovarian tumor.  I thought that this could very well be a primary appendiceal carcinoma and asked Dr. Prakash Ernst to come to the operating room to proceed with a right hemicolectomy, which he did.  At the completion of the procedure, the patient still had some miliary disease, although the vast majority of the bulky disease had been removed.  She had no diaphragmatic disease.  She had a little bit of disease on the infrahepatic peritoneum and she had some scattered nodules along the mesenteric surfaces of the small bowel.      PROCEDURE IN DETAIL:  The patient was taken to the operating room.  She received 2 grams of Ancef as antibiotic prophylaxis.  Knee-high sequential compression devices were placed on her lower extremities.  She was placed in the supine position on the operating room table on a pink pad.  General endotracheal anesthesia was administered in the usual fashion.  Once intubated, she was repositioned in low lithotomy position using well-padded Jean stirrups.  Her arms were padded and held at her side with draw sheets over her  hands and arms.  Shoulder braces were applied to her shoulders and a Mejia was placed above her forehead.  She was prepped and draped in the usual sterile fashion.  A timeout was conducted and everyone agreed upon the procedure.  I had already demarcated 3-4 port sites above the umbilicus at approximately 23 cm above the symphysis pubis.  I infiltrated each of 3 areas and tried to get in with the Veress needle, but pressures were too high.  I finally got in, in the right supraumbilical area and with the Veress needle at normal intra-abdominal pressures of 2 mmHg.  Once the pneumoperitoneum had been established to 15 mmHg, I did replace the Veress needle with an 8 mm trocar.  We introduced the camera through this and we immediately could see that she had omental caking, that there was considerable disease on the pelvic anterior peritoneal surface.  At that point I elected to proceed with an exploratory laparotomy.  Once the instruments had been exchanged out, her abdomen was opened from the symphysis pubis around the right side and the umbilicus to the infra-epigastric area.  It was taken down through the subcutaneous tissue and through the fascia with electrocautery.  The muscles were divided in the midline.  The posterior rectus sheath was opened.  The peritoneal cavity was incised and extended superiorly and inferiorly.  A Bookwalter retractor was placed around the patient's abdomen.  I started by mobilizing the right side of the colon.  I mobilized the colon off of the involved peritoneum and biopsied some of the involved peritoneum in the right pelvis and pericolic gutters.  I mobilized the hepatic flexure.  I freed up the omentum from the right side of the colon.  I then placed her in Trendelenburg.  I packed the bowel away with moist laps and a moist towel held in place with a malleable retractor for the Bookwalter.  I divided the round ligament on the right-hand side with the LigaSure device.  I opened up  the posterior broad ligament peritoneum.  I isolated the ovarian vessels above an area where the appendix was stuck down near the ovarian blood supply.  They were clamped off with a curved Zeppelin clamp and then divided with the LigaSure device, making sure the ureter was clearly out of harm's way and then we also tied the ovarian vessels.  I mobilized the ovary on its peritoneum.  Proximally I placed two Carmalt clamps at the cornua of the uterus.  She had uterine serosal implants and bladder peritoneal implants.  I divided the proximal fallopian tube and round ligament just outside the Carmalt clamp and the ovary and tube were passed off the table.  I then proceeded to take the ovarian blood supply on the left-hand side even though the ovary and tube were surgically absent.  This helped me mobilize things towards the uterus.  I divided the vesicouterine peritoneum and took the bladder down off the anterior surface of the cervix and upper vagina.  In the posterior cul-de-sac you could not see it, but you could feel that there was tumor along the base of the cervix.  Because of this, I elected to do a supracervical hysterectomy.  I started on either side with the LigaSure, taking the soft tissues at the isthmus and then taking the main branch of the uterine artery and then we went down the cardinal ligament.  With 1 bite of a straight Zeppelin the tissues were divided free of the cervix and then suture ligated with a 0 Vicryl suture on a CT2 needle.  I then transected the cervix, taking the upper portion of the cervix and leaving the remaining two-thirds of the cervix behind because of the tumor involvement in this area.  I passed the supracervical hysterectomy specimen off the table.  I oversewed the cervix with interrupted 0 Vicryl sutures on a CT1 needle.  I then mobilized the rest of the right colon and had called Dr. Ernst to the operating room for an intraoperative consultation and procedure.  I then worked  on the omentum, freeing up the omentum from the hepatic flexure to the splenic flexure from the transverse colon and then mobilizing the gastrocolic ligament and dividing the omental branches just beneath the gastroepiploic arcade and then coming across the omentum near the splenic hilum.  This was passed off the table.  Please refer to Dr. Ernst's portion, which was the right hemicolectomy.  After Dr. Ernst finished with his right hemicolectomy and we walked the rest of the small bowel to find 2 areas that had caused some stricture on the mesenteric border, but there was no definitive area of obstruction at this point.  Seprafilm was laid in the pelvis.  It was also laid over the patient's intestines underneath the incision.  We did a mass closure using #1 looped PDS suture from superior and inferior aspects to the midline with each suture tied to itself and then we used #1 Ethibond sutures in an interrupted fashion to reinforce this.  The subcutaneous tissue was irrigated.  The skin was reapproximated with staples.  An island dressing was placed over her incision, and Steri-Strips were laid over the laparoscopic incision sites that had been closed with 4-0 Monocryl in a single closure and a subcuticular skin closure.      Petty Escobedo was my primary assist for the case.  She helped me with all aspects of the case including the diagnostic laparoscopy, putting ports in, and subsequently helped me with opening and closing of the abdomen as well as completing the hysterectomy and the omentectomy.         MARBIN RIOS MD             D: 2020   T: 2020   MT: NEGRA      Name:     SHANIQUE KUNZ   MRN:      [id number]        Account:        [id number]   :      1980           Procedure Date: 2020      Document: [id number]       cc: Odilia Montero MD

## 2020-07-29 LAB
ABO + RH BLD: NORMAL
ABO + RH BLD: NORMAL
APTT PPP: 26 SEC (ref 22–37)
BLD GP AB SCN SERPL QL: NORMAL
BLD PROD TYP BPU: NORMAL
BLD PROD TYP BPU: NORMAL
BLD UNIT ID BPU: 0
BLOOD BANK CMNT PATIENT-IMP: NORMAL
BLOOD PRODUCT CODE: NORMAL
BPU ID: NORMAL
GLUCOSE BLDC GLUCOMTR-MCNC: 108 MG/DL (ref 70–99)
HGB BLD-MCNC: 7.1 G/DL (ref 11.7–15.7)
HGB BLD-MCNC: 8.7 G/DL (ref 11.7–15.7)
INR PPP: 1.27 (ref 0.86–1.14)
NUM BPU REQUESTED: 1
PLATELET # BLD AUTO: 184 10E9/L (ref 150–450)
SPECIMEN EXP DATE BLD: NORMAL
TRANSFUSION STATUS PATIENT QL: NORMAL
TRANSFUSION STATUS PATIENT QL: NORMAL

## 2020-07-29 PROCEDURE — 36415 COLL VENOUS BLD VENIPUNCTURE: CPT | Performed by: PHYSICIAN ASSISTANT

## 2020-07-29 PROCEDURE — 86900 BLOOD TYPING SEROLOGIC ABO: CPT | Performed by: PHYSICIAN ASSISTANT

## 2020-07-29 PROCEDURE — 25000128 H RX IP 250 OP 636: Performed by: NURSE PRACTITIONER

## 2020-07-29 PROCEDURE — 25800030 ZZH RX IP 258 OP 636: Performed by: PHYSICIAN ASSISTANT

## 2020-07-29 PROCEDURE — 40000257 ZZH STATISTIC CONSULT NO CHARGE VASC ACCESS

## 2020-07-29 PROCEDURE — 86850 RBC ANTIBODY SCREEN: CPT | Performed by: PHYSICIAN ASSISTANT

## 2020-07-29 PROCEDURE — 25800030 ZZH RX IP 258 OP 636: Performed by: NURSE PRACTITIONER

## 2020-07-29 PROCEDURE — 86901 BLOOD TYPING SEROLOGIC RH(D): CPT | Performed by: PHYSICIAN ASSISTANT

## 2020-07-29 PROCEDURE — 85730 THROMBOPLASTIN TIME PARTIAL: CPT | Performed by: PHYSICIAN ASSISTANT

## 2020-07-29 PROCEDURE — 12000000 ZZH R&B MED SURG/OB

## 2020-07-29 PROCEDURE — P9016 RBC LEUKOCYTES REDUCED: HCPCS | Performed by: PHYSICIAN ASSISTANT

## 2020-07-29 PROCEDURE — 85049 AUTOMATED PLATELET COUNT: CPT | Performed by: PHYSICIAN ASSISTANT

## 2020-07-29 PROCEDURE — 85610 PROTHROMBIN TIME: CPT | Performed by: PHYSICIAN ASSISTANT

## 2020-07-29 PROCEDURE — 85018 HEMOGLOBIN: CPT | Performed by: PHYSICIAN ASSISTANT

## 2020-07-29 PROCEDURE — 00000146 ZZHCL STATISTIC GLUCOSE BY METER IP

## 2020-07-29 PROCEDURE — 86923 COMPATIBILITY TEST ELECTRIC: CPT | Performed by: PHYSICIAN ASSISTANT

## 2020-07-29 RX ADMIN — POTASSIUM CHLORIDE, DEXTROSE MONOHYDRATE AND SODIUM CHLORIDE: 150; 5; 450 INJECTION, SOLUTION INTRAVENOUS at 15:23

## 2020-07-29 RX ADMIN — HYDROMORPHONE HYDROCHLORIDE 0.5 MG: 1 INJECTION, SOLUTION INTRAMUSCULAR; INTRAVENOUS; SUBCUTANEOUS at 20:57

## 2020-07-29 RX ADMIN — HYDROMORPHONE HYDROCHLORIDE 0.3 MG: 1 INJECTION, SOLUTION INTRAMUSCULAR; INTRAVENOUS; SUBCUTANEOUS at 13:19

## 2020-07-29 RX ADMIN — SODIUM CHLORIDE 500 ML: 9 INJECTION, SOLUTION INTRAVENOUS at 09:40

## 2020-07-29 RX ADMIN — POTASSIUM CHLORIDE, DEXTROSE MONOHYDRATE AND SODIUM CHLORIDE: 150; 5; 450 INJECTION, SOLUTION INTRAVENOUS at 01:28

## 2020-07-29 RX ADMIN — HYDROMORPHONE HYDROCHLORIDE 0.3 MG: 1 INJECTION, SOLUTION INTRAMUSCULAR; INTRAVENOUS; SUBCUTANEOUS at 09:45

## 2020-07-29 RX ADMIN — HYDROMORPHONE HYDROCHLORIDE 0.3 MG: 1 INJECTION, SOLUTION INTRAMUSCULAR; INTRAVENOUS; SUBCUTANEOUS at 04:11

## 2020-07-29 RX ADMIN — HYDROMORPHONE HYDROCHLORIDE 0.5 MG: 1 INJECTION, SOLUTION INTRAMUSCULAR; INTRAVENOUS; SUBCUTANEOUS at 17:20

## 2020-07-29 ASSESSMENT — MIFFLIN-ST. JEOR: SCORE: 1346.13

## 2020-07-29 ASSESSMENT — ACTIVITIES OF DAILY LIVING (ADL)
ADLS_ACUITY_SCORE: 16
ADLS_ACUITY_SCORE: 16
ADLS_ACUITY_SCORE: 15
ADLS_ACUITY_SCORE: 16
ADLS_ACUITY_SCORE: 15
ADLS_ACUITY_SCORE: 15

## 2020-07-29 NOTE — PLAN OF CARE
A&Ox4.  VSS, RA. Complained of abdomen/incisional pain; controlled with PRN Dilaudid x 1.  Midline incision covered, dried drainage noted; abdominal binder in-place.  PIV infusing D5 1/2 NS with 20 Kcl @ 100 mL/hr.  NPO.  BS hypoactive, no BM this shift.  Patient encouraged to get OOB however she declined this shift.  Bryan in place with adequate output.  Discharge pending progress.

## 2020-07-29 NOTE — PROVIDER NOTIFICATION
MD Notification    Notified Person: MD    Notified Person Name: FERCHO Dove    Notification Date/Time: 7/29/2020 8:04 AM     Notification Interaction:     Purpose of Notification: Hgb 7.1. otherwise stable    Orders Received: Will address in rounds shortly.     Comments:

## 2020-07-29 NOTE — PLAN OF CARE
Pt is A&Ox4, VSS- tmax 100.5 see provider notification regarding BC and antibotics, IVF infusing, up with A1 GB, abd pain with-  Dilaudid IV x1. NPO, ice and meds. continent- voiding. hgb 8.7.  Discharge to home towards the end of the week- will need to resume lovenox after discharge.

## 2020-07-29 NOTE — PLAN OF CARE
C/o abdominal pian. Dilaudid given. Bryan discontinued at 12, DTV. Up when hemodynamically stable. IVF bolus and 1U PRBC transfused for hgb 7.1 and dizziness. +Flatus, no BM today. Incision and lap sites open to air, binder

## 2020-07-29 NOTE — PROGRESS NOTES
"[facility]  Hospitalist Progress Note          Assessment and Plan:     Ovarian cancer, bilateral goblet cell mucinous carcinoma: POD 2 Diagnostic laparoscopy, supracervical hysterectomy, RSO, omentectomy, tumor debulking, right hemicoloectomy (Dr. Prakash Ernst) with ileotransverse enterocolostomy for suspected primary appendiceal carcinoma.  Discussed routine post op with Juan Pablomaribelswathinohelia this AM; IS, OOB. Discontinue bains.  She will need adjuvant chemotherapy of some form which we'll address once pathology back.  Pain reasonable controlled. Diet per colorectal surgery, currently recommend NPO.     Post operative anemia: likely dilutional component. Transfuse 1 unit PRBC's per colorectal. Monitor Hgb.     Tachycardia: likely r/t anemia. Monitor.     DVT Prophylaxis: PCD's. Hold lovenox until Hgb recovered.  Will continue for 4 wks post op.     Disposition:  May benefit from discharge planning.  Has 5 children (3 living with her).  She is recently  and living with her children at a friend's home.  Will have  follow. Anticipate discharge end of week.    Petty Escobedo, FERCHO  GYN ONC  Cell: [phone]             Interval History:   Pain moderately controlled. Tired. No flatus today. Had bloody BM yesterday. Denies CP or SOB. Denies nausea.               Medications:   I have reviewed this patient's current medications               Physical Exam:   Blood pressure 107/54, pulse 70, temperature 98.8  F (37.1  C), temperature source Oral, resp. rate 14, height 1.6 m (5' 3\"), weight 69.7 kg (153 lb 10.6 oz), last menstrual period 2020, SpO2 98 %.        Vital Sign Ranges  Temperature Temp  Av.1  F (36.2  C)  Min: 96  F (35.6  C)  Max: 98  F (36.7  C)   Blood pressure Systolic (24hrs), Av , Min:103 , Max:184        Diastolic (24hrs), Av, Min:64, Max:123      Pulse Pulse  Av  Min: 56  Max: 70   Respirations Resp  Av.3  Min: 9  Max: 29   Pulse oximetry SpO2  Av.9 %  Min: " 95 %  Max: 100 %           Intake/Output Summary (Last 24 hours) at 7/29/2020 1100  Last data filed at 7/29/2020 0615  Gross per 24 hour   Intake 628 ml   Output 500 ml   Net 128 ml       Lungs:   Clear anteriorly     Cardiovascular:  RRR      Abdomen:    BS+, Soft, non-distended, dressing removed. Incision c/d/i with staples.      Musculoskeletal:   no lower extremity pitting edema present                Data:   All laboratory data reviewed

## 2020-07-29 NOTE — PLAN OF CARE
Pt A/Ox4, VSS on RA. Lethargic, sleeping most of shift. Up to bedside, encouraged to ambulate but pt was unable due to pain. IV dilaudid managing. Midline incison with moderate dried drainage, dressing reinforced with ABD pads. Abdominal binder in place. Other skin WDL.  Remains NPO, encouraged small sips of clear liquids. Bryan with adequate UOP. BS hypoactive, no BM. Lovenox and toradol held due to bloody stool on day shift. Discharge pending progress, continue to monitor.

## 2020-07-29 NOTE — PROVIDER NOTIFICATION
MD Notification    Person notified: GYN NP    Person Name: Emiliano Escobedo    Date/Time: 7/29/2020 at 1612    Interaction: phone call    Purpose of Notification: temp 100.5, but patient did have a blood transfusion today.  Also unsure if blood cultures were drawn or need for post op amitotics.    Orders Received: no blood cultures till greater than 101, hold off on any other orders at this time.  Okay to call emiliano till 7pm- then call on-call service.    Comments:

## 2020-07-29 NOTE — PROGRESS NOTES
[facility]  PROGRESS NOTE    July 29, 2020  Post-op Day # 2 s/p diagnostic laparoscopy, supracervical hysterectomy, RSO, omentectomy, tumor debulking (Dr Nicole), right hemicoloectomy (Dr. 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    SUBJECTIVE:  Feels dizzy and light headed. Feels her head pounding when tries to stand up. Intermittent nausea. Taking sips of liquids. -flatus, -BM. BRBPR yesterday afternoon, no episode since that time.     OBJECTIVE:  Temp:  [97.6  F (36.4  C)-99.6  F (37.6  C)] 98.8  F (37.1  C)  Heart Rate:  [] 102  Resp:  [14-16] 14  BP: (100-117)/(46-65) 107/54  SpO2:  [98 %-100 %] 98 %    Intake/Output Summary (Last 24 hours) at 7/29/2020 0838  Last data filed at 7/29/2020 0615  Gross per 24 hour   Intake 628 ml   Output 500 ml   Net 128 ml       GENERAL:  Awake, alert, no acute distress, lying in bed  HEAD: Normocephalic atraumatic  SCLERA: Anicteric  EXTREMITIES: Warm and well perfused  ABDOMEN:  Soft, appropriately tender, non-distended. No guarding, rigidity, or peritoneal signs. Incisions with packing, serosanguinous drainage  INCISION:  C/d/i    LABS:  Lab Results   Component Value Date    WBC 8.8 07/28/2020     Lab Results   Component Value Date    HGB 7.1 07/29/2020     Lab Results   Component Value Date    HCT 33.0 07/28/2020     Lab Results   Component Value Date     07/28/2020     Last Basic Metabolic Panel:  Lab Results   Component Value Date     07/28/2020      Lab Results   Component Value Date    POTASSIUM 4.7 07/28/2020     Lab Results   Component Value Date    CHLORIDE 108 07/28/2020     Lab Results   Component Value Date    IRON 8.1 07/28/2020     Lab Results   Component Value Date    CO2 24 07/28/2020     Lab Results   Component Value Date    BUN 9 07/28/2020     Lab Results   Component Value Date    CR 0.74 07/28/2020     Lab Results   Component Value Date     07/28/2020       ASSESSMENT/PLAN: 40 year old female POD#1 s/p  diagnostic laparoscopy, supracervical hysterectomy, RSO, omentectomy, tumor debulking (Dr Nicole) and right hemicoloectomy (Dr. 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 Tachycardic to 102 this morning and hemoglobin drop to 7.1 from 10.8 yesterday. Will plan for 1 U PRBC this morning and recheck hemoglobin this afternoon.     1. NPO diet  2. PRN pain meds  3. Continue IVF, give 500ml bolus  4. Bryan per primary  5. Monitor for ongoing bloody stools  6. Await path   7. Hold lovenox and Toradol    Patient seen and examined with Dr. Ernst. Consent for blood products signed by myself in the chart.     For questions/paging, please contact the CRS office at [phone].    Megan Barahona PA-C  [facility]  Phone: [phone]    CRS Staff.  Seen and examined with Megan Barahona.  Agree with above.    I performed a history and physical examination of the patient and discussed their management with the physician assistant. I reviewed the physician assistants note and agree with the documented findings and plan of care.     Prakash Ernst MD  [facility]  [phone] (office)  [phone] (pager)  [email or web address]

## 2020-07-30 LAB
BASOPHILS # BLD AUTO: 0 10E9/L (ref 0–0.2)
BASOPHILS NFR BLD AUTO: 0.1 %
COPATH REPORT: NORMAL
DIFFERENTIAL METHOD BLD: ABNORMAL
EOSINOPHIL # BLD AUTO: 0.1 10E9/L (ref 0–0.7)
EOSINOPHIL NFR BLD AUTO: 1.4 %
ERYTHROCYTE [DISTWIDTH] IN BLOOD BY AUTOMATED COUNT: 14.6 % (ref 10–15)
HCT VFR BLD AUTO: 25.4 % (ref 35–47)
HGB BLD-MCNC: 8.6 G/DL (ref 11.7–15.7)
IMM GRANULOCYTES # BLD: 0 10E9/L (ref 0–0.4)
IMM GRANULOCYTES NFR BLD: 0.3 %
LYMPHOCYTES # BLD AUTO: 1.3 10E9/L (ref 0.8–5.3)
LYMPHOCYTES NFR BLD AUTO: 13.7 %
MCH RBC QN AUTO: 27.9 PG (ref 26.5–33)
MCHC RBC AUTO-ENTMCNC: 33.9 G/DL (ref 31.5–36.5)
MCV RBC AUTO: 83 FL (ref 78–100)
MONOCYTES # BLD AUTO: 0.6 10E9/L (ref 0–1.3)
MONOCYTES NFR BLD AUTO: 6.7 %
NEUTROPHILS # BLD AUTO: 7.2 10E9/L (ref 1.6–8.3)
NEUTROPHILS NFR BLD AUTO: 77.8 %
NRBC # BLD AUTO: 0 10*3/UL
NRBC BLD AUTO-RTO: 0 /100
PLATELET # BLD AUTO: 185 10E9/L (ref 150–450)
RBC # BLD AUTO: 3.08 10E12/L (ref 3.8–5.2)
WBC # BLD AUTO: 9.3 10E9/L (ref 4–11)

## 2020-07-30 PROCEDURE — 85049 AUTOMATED PLATELET COUNT: CPT | Performed by: NURSE PRACTITIONER

## 2020-07-30 PROCEDURE — 25800030 ZZH RX IP 258 OP 636: Performed by: NURSE PRACTITIONER

## 2020-07-30 PROCEDURE — 25800030 ZZH RX IP 258 OP 636: Performed by: PHYSICIAN ASSISTANT

## 2020-07-30 PROCEDURE — 36415 COLL VENOUS BLD VENIPUNCTURE: CPT | Performed by: NURSE PRACTITIONER

## 2020-07-30 PROCEDURE — 12000000 ZZH R&B MED SURG/OB

## 2020-07-30 PROCEDURE — 85025 COMPLETE CBC W/AUTO DIFF WBC: CPT | Performed by: NURSE PRACTITIONER

## 2020-07-30 PROCEDURE — 25000132 ZZH RX MED GY IP 250 OP 250 PS 637: Performed by: OBSTETRICS & GYNECOLOGY

## 2020-07-30 PROCEDURE — 25000128 H RX IP 250 OP 636: Performed by: NURSE PRACTITIONER

## 2020-07-30 PROCEDURE — 25000132 ZZH RX MED GY IP 250 OP 250 PS 637: Performed by: NURSE PRACTITIONER

## 2020-07-30 RX ORDER — CALCIUM CARBONATE 500 MG/1
1000 TABLET, CHEWABLE ORAL EVERY 4 HOURS PRN
Status: DISCONTINUED | OUTPATIENT
Start: 2020-07-30 | End: 2020-08-05 | Stop reason: HOSPADM

## 2020-07-30 RX ADMIN — HYDROCODONE BITARTRATE AND ACETAMINOPHEN 2 TABLET: 5; 325 TABLET ORAL at 22:16

## 2020-07-30 RX ADMIN — HYDROCODONE BITARTRATE AND ACETAMINOPHEN 1 TABLET: 5; 325 TABLET ORAL at 09:46

## 2020-07-30 RX ADMIN — HYDROMORPHONE HYDROCHLORIDE 0.5 MG: 1 INJECTION, SOLUTION INTRAMUSCULAR; INTRAVENOUS; SUBCUTANEOUS at 00:22

## 2020-07-30 RX ADMIN — CALCIUM CARBONATE (ANTACID) CHEW TAB 500 MG 1000 MG: 500 CHEW TAB at 22:16

## 2020-07-30 RX ADMIN — POTASSIUM CHLORIDE, DEXTROSE MONOHYDRATE AND SODIUM CHLORIDE: 150; 5; 450 INJECTION, SOLUTION INTRAVENOUS at 22:16

## 2020-07-30 RX ADMIN — HYDROCODONE BITARTRATE AND ACETAMINOPHEN 1 TABLET: 5; 325 TABLET ORAL at 13:48

## 2020-07-30 RX ADMIN — HYDROMORPHONE HYDROCHLORIDE 0.5 MG: 1 INJECTION, SOLUTION INTRAMUSCULAR; INTRAVENOUS; SUBCUTANEOUS at 07:10

## 2020-07-30 RX ADMIN — HYDROCODONE BITARTRATE AND ACETAMINOPHEN 1 TABLET: 5; 325 TABLET ORAL at 18:09

## 2020-07-30 RX ADMIN — ONDANSETRON 4 MG: 4 TABLET, ORALLY DISINTEGRATING ORAL at 09:46

## 2020-07-30 RX ADMIN — HYDROMORPHONE HYDROCHLORIDE 0.5 MG: 1 INJECTION, SOLUTION INTRAMUSCULAR; INTRAVENOUS; SUBCUTANEOUS at 04:09

## 2020-07-30 RX ADMIN — POTASSIUM CHLORIDE, DEXTROSE MONOHYDRATE AND SODIUM CHLORIDE: 150; 5; 450 INJECTION, SOLUTION INTRAVENOUS at 08:27

## 2020-07-30 RX ADMIN — POTASSIUM CHLORIDE, DEXTROSE MONOHYDRATE AND SODIUM CHLORIDE: 150; 5; 450 INJECTION, SOLUTION INTRAVENOUS at 00:20

## 2020-07-30 ASSESSMENT — ACTIVITIES OF DAILY LIVING (ADL)
ADLS_ACUITY_SCORE: 15
ADLS_ACUITY_SCORE: 15
ADLS_ACUITY_SCORE: 16
ADLS_ACUITY_SCORE: 15

## 2020-07-30 ASSESSMENT — MIFFLIN-ST. JEOR: SCORE: 1331.34

## 2020-07-30 NOTE — PLAN OF CARE
POD 3. A&Ox4, pleasant. VSS. ABD pain near incision site. Started oral norco today. PIV infusing IVF @ 50 mL/hr. Up A1 and GB. Pt was able to do multiple walks!!. NPO ex ice chips and meds. BS hypoactive. Patient states is belching, but no flatus yet. Incision intact with staples. No drainage. Applying ice to site too. ABD binder in place. Continent. Discharge to home towards the end of the week- will need to resume lovenox (held today) after discharge, lovenox teaching papers given for patient to read over.

## 2020-07-30 NOTE — PLAN OF CARE
POD 3. A&Ox4, pleasant. VSS. Tmax 99.8. Down to 99.1 w/o any intervention. C/o burning in ABD near incision site. PRN 0.5 mg IV Dilaudid being given on shift; effective. PIV infusing IVF @ 100 mL/hr. Up A1 and GB. Patient moves very slowly and needs a lot of encouragement to move. Getting up by pivoting to BSC. NPO ex ice chips and meds. No c/o nausea. BS hypoactive. Patient states is belching, but no flatus yet. Incision intact with staples. No drainage. Applying ice to site too. ABD binder in place. Continent. Good UOP overnight. Patient did walk this AM from bed to door and back. Tolerated walk okay. C/o some lightheadedness. Discharge to home towards the end of the week- will need to resume lovenox after discharge.

## 2020-07-30 NOTE — PROGRESS NOTES
"[facility]  Hospitalist Progress Note          Assessment and Plan:     Ovarian cancer, bilateral goblet cell mucinous carcinoma: POD 3 Diagnostic laparoscopy, supracervical hysterectomy, RSO, omentectomy, tumor debulking, right hemicoloectomy (Dr. Prakash Ernst) with ileotransverse enterocolostomy for suspected primary appendiceal carcinoma.  Discussed routine post op with Jefe this AM; IS, OOB. Voiding spontaneously. +Flatus.  She will need adjuvant chemotherapy of some form which we'll address once pathology back.  Pain reasonable controlled. Starting norco today. Diet per colorectal surgery, clears liquids.     Post operative anemia: likely dilutional component. S/p 1 unit PRBC's . Monitor Hgb. Improved.    Tachycardia: likely r/t anemia. Monitor.     DVT Prophylaxis: Resume lovenox. Will continue for 4 wks post op. Script sent. Patient will need teaching. Discussed with RN this morning.      Disposition:  May benefit from discharge planning.  Has 5 children (3 living with her).  She is recently  and living with her children at a friend's home. She plans to go her sisters upon discharge. Will have SW see patient. Anticipate discharge over weekend.     Petty Escobedo CNP  GYN ONC  Cell: [phone]             Interval History:   Pain moderately controlled. +flatus. No further BM's. Denies CP or SOB. Denies nausea.               Medications:   I have reviewed this patient's current medications               Physical Exam:   Blood pressure 129/85, pulse 70, temperature 98.7  F (37.1  C), temperature source Oral, resp. rate 16, height 1.6 m (5' 3\"), weight 68.2 kg (150 lb 6.4 oz), last menstrual period 2020, SpO2 99 %.        Vital Sign Ranges  Temperature Temp  Av.1  F (36.2  C)  Min: 96  F (35.6  C)  Max: 98  F (36.7  C)   Blood pressure Systolic (24hrs), Av , Min:103 , Max:184        Diastolic (24hrs), Av, Min:64, Max:123      Pulse Pulse  Av  Min: 56  Max: " 70   Respirations Resp  Av.3  Min: 9  Max: 29   Pulse oximetry SpO2  Av.9 %  Min: 95 %  Max: 100 %           Intake/Output Summary (Last 24 hours) at 2020 1100  Last data filed at 2020 0615  Gross per 24 hour   Intake 628 ml   Output 500 ml   Net 128 ml       Lungs:   Clear anteriorly     Cardiovascular:  RRR      Abdomen:    BS+, Soft, non-distended, Incision c/d/i with staples.      Musculoskeletal:   no lower extremity pitting edema present                Data:   All laboratory data reviewed

## 2020-07-30 NOTE — PROGRESS NOTES
[facility]  PROGRESS NOTE    July 30, 2020  Post-op Day #3 s/p diagnostic laparoscopy, supracervical hysterectomy, RSO, omentectomy, tumor debulking (Dr Nicole), right hemicoloectomy (Dr. 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    SUBJECTIVE:  Feels a little better this morning. Tolerated a walk to the door and back to bed yesterday. Up to commode to urinate throughout the day. No nausea, still belching. -flatus, -BM. No further blood per rectum.     OBJECTIVE:  Temp:  [98.6  F (37  C)-100.5  F (38.1  C)] 99.1  F (37.3  C)  Heart Rate:  [103-109] 103  Resp:  [16-18] 16  BP: (111-125)/(62-78) 119/77  SpO2:  [97 %-100 %] 97 %    Intake/Output Summary (Last 24 hours) at 7/30/2020 0907  Last data filed at 7/30/2020 0827  Gross per 24 hour   Intake 2536 ml   Output 1225 ml   Net 1311 ml       GENERAL:  Awake, alert, no acute distress, lying in bed  HEAD: Normocephalic atraumatic  SCLERA: Anicteric  EXTREMITIES: Warm and well perfused  ABDOMEN:  Soft, appropriately tender, non-distended. No guarding, rigidity, or peritoneal signs.   INCISION:  C/d/i    LABS:  Lab Results   Component Value Date    WBC 9.3 07/30/2020     Lab Results   Component Value Date    HGB 8.6 07/30/2020     Lab Results   Component Value Date    HCT 25.4 07/30/2020     Lab Results   Component Value Date     07/30/2020     Last Basic Metabolic Panel:  Lab Results   Component Value Date     07/28/2020      Lab Results   Component Value Date    POTASSIUM 4.7 07/28/2020     Lab Results   Component Value Date    CHLORIDE 108 07/28/2020     Lab Results   Component Value Date    IRON 8.1 07/28/2020     Lab Results   Component Value Date    CO2 24 07/28/2020     Lab Results   Component Value Date    BUN 9 07/28/2020     Lab Results   Component Value Date    CR 0.74 07/28/2020     Lab Results   Component Value Date     07/28/2020       ASSESSMENT/PLAN: 40 year old female POD#3 s/p diagnostic laparoscopy,  supracervical hysterectomy, RSO, omentectomy, tumor debulking (Dr Nicole) and right hemicoloectomy (Dr. 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 Low grade fevers up to 100.5 overnight and tachycardic to 109, currently 99.1 and . Hgb stable at 8.6 this am. Await return of bowel function.     1. Clear liquid diet, advised to go slow  2. Prn pain meds  3. Continue IVF  4. OOB, ambulate  5. Await path   6. Continue to hold Lovenox and Toradol    Discussed with Dr. Ernst.     For questions/paging, please contact the CRS office at [phone].    Megan Barahona PA-C  [facility]  Phone: [phone]

## 2020-07-31 ENCOUNTER — APPOINTMENT (OUTPATIENT)
Dept: GENERAL RADIOLOGY | Facility: CLINIC | Age: 40
End: 2020-07-31
Attending: NURSE PRACTITIONER
Payer: MEDICAID

## 2020-07-31 ENCOUNTER — APPOINTMENT (OUTPATIENT)
Dept: CT IMAGING | Facility: CLINIC | Age: 40
End: 2020-07-31
Attending: NURSE PRACTITIONER
Payer: MEDICAID

## 2020-07-31 LAB
ALBUMIN SERPL-MCNC: 2.6 G/DL (ref 3.4–5)
ALBUMIN UR-MCNC: NEGATIVE MG/DL
ALP SERPL-CCNC: 61 U/L (ref 40–150)
ALT SERPL W P-5'-P-CCNC: 12 U/L (ref 0–50)
ANION GAP SERPL CALCULATED.3IONS-SCNC: 4 MMOL/L (ref 3–14)
ANION GAP SERPL CALCULATED.3IONS-SCNC: 6 MMOL/L (ref 3–14)
APPEARANCE UR: CLEAR
AST SERPL W P-5'-P-CCNC: 16 U/L (ref 0–45)
BACTERIA #/AREA URNS HPF: ABNORMAL /HPF
BILIRUB SERPL-MCNC: 1.7 MG/DL (ref 0.2–1.3)
BILIRUB UR QL STRIP: NEGATIVE
BUN SERPL-MCNC: 14 MG/DL (ref 7–30)
BUN SERPL-MCNC: 14 MG/DL (ref 7–30)
CALCIUM SERPL-MCNC: 8.8 MG/DL (ref 8.5–10.1)
CALCIUM SERPL-MCNC: 9.1 MG/DL (ref 8.5–10.1)
CHLORIDE SERPL-SCNC: 105 MMOL/L (ref 94–109)
CHLORIDE SERPL-SCNC: 106 MMOL/L (ref 94–109)
CO2 SERPL-SCNC: 26 MMOL/L (ref 20–32)
CO2 SERPL-SCNC: 27 MMOL/L (ref 20–32)
COLOR UR AUTO: YELLOW
CREAT SERPL-MCNC: 0.61 MG/DL (ref 0.52–1.04)
CREAT SERPL-MCNC: 0.67 MG/DL (ref 0.52–1.04)
ERYTHROCYTE [DISTWIDTH] IN BLOOD BY AUTOMATED COUNT: 14 % (ref 10–15)
ERYTHROCYTE [DISTWIDTH] IN BLOOD BY AUTOMATED COUNT: 14.1 % (ref 10–15)
GFR SERPL CREATININE-BSD FRML MDRD: >90 ML/MIN/{1.73_M2}
GFR SERPL CREATININE-BSD FRML MDRD: >90 ML/MIN/{1.73_M2}
GLUCOSE SERPL-MCNC: 126 MG/DL (ref 70–99)
GLUCOSE SERPL-MCNC: 142 MG/DL (ref 70–99)
GLUCOSE UR STRIP-MCNC: NEGATIVE MG/DL
HCT VFR BLD AUTO: 26.7 % (ref 35–47)
HCT VFR BLD AUTO: 27.6 % (ref 35–47)
HGB BLD-MCNC: 8.6 G/DL (ref 11.7–15.7)
HGB BLD-MCNC: 8.9 G/DL (ref 11.7–15.7)
HGB BLD-MCNC: 9.1 G/DL (ref 11.7–15.7)
HGB BLD-MCNC: 9.2 G/DL (ref 11.7–15.7)
HGB UR QL STRIP: ABNORMAL
KETONES UR STRIP-MCNC: 10 MG/DL
LACTATE BLD-SCNC: 0.7 MMOL/L (ref 0.7–2)
LEUKOCYTE ESTERASE UR QL STRIP: ABNORMAL
MCH RBC QN AUTO: 27.1 PG (ref 26.5–33)
MCH RBC QN AUTO: 27.7 PG (ref 26.5–33)
MCHC RBC AUTO-ENTMCNC: 33.3 G/DL (ref 31.5–36.5)
MCHC RBC AUTO-ENTMCNC: 34.1 G/DL (ref 31.5–36.5)
MCV RBC AUTO: 81 FL (ref 78–100)
MCV RBC AUTO: 81 FL (ref 78–100)
MUCOUS THREADS #/AREA URNS LPF: PRESENT /LPF
NITRATE UR QL: NEGATIVE
PH UR STRIP: 5 PH (ref 5–7)
PLATELET # BLD AUTO: 260 10E9/L (ref 150–450)
PLATELET # BLD AUTO: 261 10E9/L (ref 150–450)
POTASSIUM SERPL-SCNC: 3.6 MMOL/L (ref 3.4–5.3)
POTASSIUM SERPL-SCNC: 3.6 MMOL/L (ref 3.4–5.3)
PROCALCITONIN SERPL-MCNC: 1.63 NG/ML
PROT SERPL-MCNC: 6.4 G/DL (ref 6.8–8.8)
RBC # BLD AUTO: 3.28 10E12/L (ref 3.8–5.2)
RBC # BLD AUTO: 3.39 10E12/L (ref 3.8–5.2)
RBC #/AREA URNS AUTO: 1 /HPF (ref 0–2)
SODIUM SERPL-SCNC: 137 MMOL/L (ref 133–144)
SODIUM SERPL-SCNC: 137 MMOL/L (ref 133–144)
SOURCE: ABNORMAL
SP GR UR STRIP: 1.02 (ref 1–1.03)
SQUAMOUS #/AREA URNS AUTO: 4 /HPF (ref 0–1)
UROBILINOGEN UR STRIP-MCNC: 2 MG/DL (ref 0–2)
WBC # BLD AUTO: 13.8 10E9/L (ref 4–11)
WBC # BLD AUTO: 16.5 10E9/L (ref 4–11)
WBC #/AREA URNS AUTO: 5 /HPF (ref 0–5)

## 2020-07-31 PROCEDURE — 40000556 ZZH STATISTIC PERIPHERAL IV START W US GUIDANCE

## 2020-07-31 PROCEDURE — 36415 COLL VENOUS BLD VENIPUNCTURE: CPT | Performed by: PHYSICIAN ASSISTANT

## 2020-07-31 PROCEDURE — 83605 ASSAY OF LACTIC ACID: CPT | Performed by: NURSE PRACTITIONER

## 2020-07-31 PROCEDURE — 85027 COMPLETE CBC AUTOMATED: CPT | Performed by: PHYSICIAN ASSISTANT

## 2020-07-31 PROCEDURE — 36415 COLL VENOUS BLD VENIPUNCTURE: CPT | Performed by: OBSTETRICS & GYNECOLOGY

## 2020-07-31 PROCEDURE — 71275 CT ANGIOGRAPHY CHEST: CPT

## 2020-07-31 PROCEDURE — 36415 COLL VENOUS BLD VENIPUNCTURE: CPT | Performed by: NURSE PRACTITIONER

## 2020-07-31 PROCEDURE — 80048 BASIC METABOLIC PNL TOTAL CA: CPT | Performed by: PHYSICIAN ASSISTANT

## 2020-07-31 PROCEDURE — 85027 COMPLETE CBC AUTOMATED: CPT | Performed by: NURSE PRACTITIONER

## 2020-07-31 PROCEDURE — 81001 URINALYSIS AUTO W/SCOPE: CPT | Performed by: NURSE PRACTITIONER

## 2020-07-31 PROCEDURE — 87040 BLOOD CULTURE FOR BACTERIA: CPT | Performed by: NURSE PRACTITIONER

## 2020-07-31 PROCEDURE — 85018 HEMOGLOBIN: CPT | Performed by: OBSTETRICS & GYNECOLOGY

## 2020-07-31 PROCEDURE — 84145 PROCALCITONIN (PCT): CPT | Performed by: NURSE PRACTITIONER

## 2020-07-31 PROCEDURE — 25000128 H RX IP 250 OP 636: Performed by: OBSTETRICS & GYNECOLOGY

## 2020-07-31 PROCEDURE — 80053 COMPREHEN METABOLIC PANEL: CPT | Performed by: NURSE PRACTITIONER

## 2020-07-31 PROCEDURE — 25000125 ZZHC RX 250: Performed by: OBSTETRICS & GYNECOLOGY

## 2020-07-31 PROCEDURE — 12000000 ZZH R&B MED SURG/OB

## 2020-07-31 PROCEDURE — 25000132 ZZH RX MED GY IP 250 OP 250 PS 637: Performed by: NURSE PRACTITIONER

## 2020-07-31 PROCEDURE — 25000128 H RX IP 250 OP 636: Performed by: NURSE PRACTITIONER

## 2020-07-31 PROCEDURE — 25800030 ZZH RX IP 258 OP 636: Performed by: NURSE PRACTITIONER

## 2020-07-31 PROCEDURE — 93010 ELECTROCARDIOGRAM REPORT: CPT | Performed by: INTERNAL MEDICINE

## 2020-07-31 PROCEDURE — 71046 X-RAY EXAM CHEST 2 VIEWS: CPT

## 2020-07-31 PROCEDURE — 93005 ELECTROCARDIOGRAM TRACING: CPT

## 2020-07-31 PROCEDURE — 85018 HEMOGLOBIN: CPT | Performed by: PHYSICIAN ASSISTANT

## 2020-07-31 PROCEDURE — 40000257 ZZH STATISTIC CONSULT NO CHARGE VASC ACCESS

## 2020-07-31 RX ORDER — IOPAMIDOL 755 MG/ML
75 INJECTION, SOLUTION INTRAVASCULAR ONCE
Status: COMPLETED | OUTPATIENT
Start: 2020-07-31 | End: 2020-07-31

## 2020-07-31 RX ORDER — SIMETHICONE 80 MG
80 TABLET,CHEWABLE ORAL EVERY 6 HOURS PRN
Status: DISCONTINUED | OUTPATIENT
Start: 2020-07-31 | End: 2020-08-05 | Stop reason: HOSPADM

## 2020-07-31 RX ORDER — ACETAMINOPHEN 325 MG/1
975 TABLET ORAL EVERY 4 HOURS PRN
Status: DISCONTINUED | OUTPATIENT
Start: 2020-07-31 | End: 2020-08-05 | Stop reason: HOSPADM

## 2020-07-31 RX ORDER — PIPERACILLIN SODIUM, TAZOBACTAM SODIUM 4; .5 G/20ML; G/20ML
4.5 INJECTION, POWDER, LYOPHILIZED, FOR SOLUTION INTRAVENOUS EVERY 6 HOURS
Status: DISCONTINUED | OUTPATIENT
Start: 2020-07-31 | End: 2020-08-04

## 2020-07-31 RX ORDER — SODIUM CHLORIDE, SODIUM LACTATE, POTASSIUM CHLORIDE, CALCIUM CHLORIDE 600; 310; 30; 20 MG/100ML; MG/100ML; MG/100ML; MG/100ML
INJECTION, SOLUTION INTRAVENOUS CONTINUOUS
Status: DISCONTINUED | OUTPATIENT
Start: 2020-07-31 | End: 2020-08-04

## 2020-07-31 RX ADMIN — PIPERACILLIN SODIUM AND TAZOBACTAM SODIUM 4.5 G: 4; .5 INJECTION, POWDER, LYOPHILIZED, FOR SOLUTION INTRAVENOUS at 17:11

## 2020-07-31 RX ADMIN — SIMETHICONE 80 MG: 80 TABLET, CHEWABLE ORAL at 15:05

## 2020-07-31 RX ADMIN — ACETAMINOPHEN 975 MG: 325 TABLET, FILM COATED ORAL at 21:02

## 2020-07-31 RX ADMIN — HYDROMORPHONE HYDROCHLORIDE 0.5 MG: 1 INJECTION, SOLUTION INTRAMUSCULAR; INTRAVENOUS; SUBCUTANEOUS at 21:05

## 2020-07-31 RX ADMIN — ACETAMINOPHEN 975 MG: 325 TABLET, FILM COATED ORAL at 16:42

## 2020-07-31 RX ADMIN — IOPAMIDOL 75 ML: 755 INJECTION, SOLUTION INTRAVENOUS at 18:18

## 2020-07-31 RX ADMIN — SODIUM CHLORIDE, POTASSIUM CHLORIDE, SODIUM LACTATE AND CALCIUM CHLORIDE: 600; 310; 30; 20 INJECTION, SOLUTION INTRAVENOUS at 18:34

## 2020-07-31 RX ADMIN — PIPERACILLIN SODIUM AND TAZOBACTAM SODIUM 4.5 G: 4; .5 INJECTION, POWDER, LYOPHILIZED, FOR SOLUTION INTRAVENOUS at 23:08

## 2020-07-31 RX ADMIN — PROCHLORPERAZINE EDISYLATE 10 MG: 5 INJECTION INTRAMUSCULAR; INTRAVENOUS at 14:25

## 2020-07-31 RX ADMIN — HYDROMORPHONE HYDROCHLORIDE 0.5 MG: 1 INJECTION, SOLUTION INTRAMUSCULAR; INTRAVENOUS; SUBCUTANEOUS at 17:40

## 2020-07-31 RX ADMIN — ONDANSETRON 4 MG: 4 TABLET, ORALLY DISINTEGRATING ORAL at 06:14

## 2020-07-31 RX ADMIN — SODIUM CHLORIDE 63 ML: 9 INJECTION, SOLUTION INTRAVENOUS at 18:17

## 2020-07-31 RX ADMIN — SODIUM CHLORIDE, POTASSIUM CHLORIDE, SODIUM LACTATE AND CALCIUM CHLORIDE 500 ML: 600; 310; 30; 20 INJECTION, SOLUTION INTRAVENOUS at 16:39

## 2020-07-31 RX ADMIN — HYDROMORPHONE HYDROCHLORIDE 0.5 MG: 1 INJECTION, SOLUTION INTRAMUSCULAR; INTRAVENOUS; SUBCUTANEOUS at 14:11

## 2020-07-31 RX ADMIN — HYDROMORPHONE HYDROCHLORIDE 0.5 MG: 1 INJECTION, SOLUTION INTRAMUSCULAR; INTRAVENOUS; SUBCUTANEOUS at 09:31

## 2020-07-31 RX ADMIN — HYDROCODONE BITARTRATE AND ACETAMINOPHEN 1 TABLET: 5; 325 TABLET ORAL at 09:27

## 2020-07-31 ASSESSMENT — ACTIVITIES OF DAILY LIVING (ADL)
ADLS_ACUITY_SCORE: 16
ADLS_ACUITY_SCORE: 15
ADLS_ACUITY_SCORE: 16

## 2020-07-31 ASSESSMENT — MIFFLIN-ST. JEOR: SCORE: 1325.44

## 2020-07-31 NOTE — PROVIDER NOTIFICATION
MD Notification    Notified Person: MD    Notified Person Name: Dr Ernst    Notification Date/Time: 1143 7/31    Notification Interaction: Phone call    Purpose of Notification: Pt had large amount of BRB from rectum with BM.    Orders Received: continue to monitor; recheck Hgb tomorrow    Comments:

## 2020-07-31 NOTE — CODE/RAPID RESPONSE
Sepsis Evaluation Progress Note    I was called to see Jefe Blanc due to abnormal vital signs triggering the Sepsis SIRS screening alert. She is not known to have an infection.     Physical Exam   Vital Signs:  Temp: 101.8  F (38.8  C) Temp src: Axillary BP: (!) 151/97   Heart Rate: 131 Resp: 20 SpO2: 98 % O2 Device: None (Room air)      Lab:  Lactic Acid   Date Value Ref Range Status   07/31/2020 0.7 0.7 - 2.0 mmol/L Final       The patient is at baseline mental status.     The rest of their physical exam is significant for see RRT note    Assessment & Plan   Jefe Blanc meets SIRS criteria but does NOT have a lactate >2 or other evidence of acute organ damage.  These vital sign, lab and physical exam findings are consistent with SEPSIS.    Sepsis Time-Zero (time Sepsis diagnosis confirmed): 1507  07/31/20    Anti-infectives (From now, onward)    Start     Dose/Rate Route Frequency Ordered Stop    07/31/20 1545  piperacillin-tazobactam (ZOSYN) 4.5 g vial to attach to  mL bag      4.5 g  over 30 Minutes Intravenous EVERY 6 HOURS 07/31/20 1544          Current antibiotic coverage requires additional antibiotics for unknown source.     Disposition: The patient will remain on the current unit. We will continue to monitor this patient closely..  TORRES Velazquez CNP    Sepsis Criteria   Sepsis: 2+ SIRS criteria due to infection  Severe Sepsis: Sepsis AND 1+ new sign of acute organ dysfunction (Note: lactate >2 is organ dysfunction)  Septic Shock: Sepsis AND hypotension despite volume resuscitation with 30 ml/kg crystalloid

## 2020-07-31 NOTE — PROGRESS NOTES
[facility]CT[facility]  PROGRESS NOTE    July 31, 2020  Post-op Day #4diagnostic laparoscopy, supracervical hysterectomy, RSO, omentectomy, tumor debulking (Dr Nicole), right hemicoloectomy (Dr. 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    SUBJECTIVE:  Slowly improving. Had a small stool, maroon in color with some flatus yesterday. Not consistently passing gas though. Working on pain control. No nausea, but continues to have belching.     OBJECTIVE:  Temp:  [97.6  F (36.4  C)-99.8  F (37.7  C)] 97.6  F (36.4  C)  Heart Rate:  [] 105  Resp:  [16-18] 18  BP: (127-133)/(80-94) 127/85  SpO2:  [98 %-100 %] 98 %    Intake/Output Summary (Last 24 hours) at 7/31/2020 0946  Last data filed at 7/31/2020 0900  Gross per 24 hour   Intake 1950 ml   Output 2650 ml   Net -700 ml       GENERAL:  Awake, alert, no acute distress, lying in bed  HEAD: Normocephalic atraumatic  SCLERA: Anicteric  EXTREMITIES: Warm and well perfused  ABDOMEN:  Soft, appropriately tender, non-distended. No guarding, rigidity, or peritoneal signs.   INCISION:  C/d/i,     LABS:  Lab Results   Component Value Date    WBC 9.3 07/30/2020     Lab Results   Component Value Date    HGB 8.6 07/30/2020     Lab Results   Component Value Date    HCT 25.4 07/30/2020     Lab Results   Component Value Date     07/30/2020     Last Basic Metabolic Panel:  Lab Results   Component Value Date     07/28/2020      Lab Results   Component Value Date    POTASSIUM 4.7 07/28/2020     Lab Results   Component Value Date    CHLORIDE 108 07/28/2020     Lab Results   Component Value Date    IRON 8.1 07/28/2020     Lab Results   Component Value Date    CO2 24 07/28/2020     Lab Results   Component Value Date    BUN 9 07/28/2020     Lab Results   Component Value Date    CR 0.74 07/28/2020     Lab Results   Component Value Date     07/28/2020       ASSESSMENT/PLAN: 40 year old female POD#3 s/p diagnostic laparoscopy, supracervical hysterectomy, RSO,  omentectomy, tumor debulking (Dr Nicole) and right hemicoloectomy (Dr. 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 Await further return of bowel function. Will recheck hgb this morning.      1. Full liquid diet  2. Prn pain meds  3. Decrease IVF  4. OOB, ambulate  5. Path back, awaiting Dr. Ernst's input.   6. Continue to hold lovenox until full return of bowel function and hgb stable.     For questions/paging, please contact the CRS office at [phone].    Megan Barahona PA-C  [facility]  Phone: [phone]

## 2020-07-31 NOTE — CODE/RAPID RESPONSE
"[facility]    RRT Note  7/31/2020   Time Called: 1428    Code Status: Full Code    I was called to evaluate Jefe Blanc for worsening tachycardia, who is a 40 year old female who was admitted on 7/27/2020 for elective diagnostic laparoscopy, hysterectomy, RSO, omentectomy, tumor debulking, right hemicoloectomy (Dr. Prakash Ernst - colorectal surgery) with ileotransverse enterocolostomy for  primary appendiceal carcinoma, now POD#4.    Since surgery, she has been having intermittent sharp pain, associated with burping and stooling, and poor oral intake. She is breathing shallowly, rating her RUQ abdominal pain at 8/10. She denies any dyspnea, but endorses nausea without vomiting, chills.     Of note, the patient has had 2 maroon stools, which per CR surgery - is not uncommon given the extensiveness of her surgery. She has been anemia post operatively, requiring 1 unit PRBC, Hgb stable today at 8.3. She has been on D5 w/ 20k since surgery, rate decreased today.     Assessment & Plan     Sepsis, unknown source: On my initial exam, the patient is in the upright position in bed. She does not appear in any distress, occasionally drifting off to sleep. She tolerates an abdominal exam - vertical incision with staples is CDI, no signs of infection; tenderness with palpation in RUQ. No guarding or rebound tenderness. She is urinating several times a day, reporting it is \"clear\". Her heart rate has been around 100-105 for the past 24 hours, BP slowly trending upwards. Oxygenation is 96-98% on RA, RR ~14, she denies pleuritic pain. No respiratory distress noted, in fact she is breathing quite shallowly.   -labs: CBC, CMP, lactic acid   -simethicone PRN    Approximately 30 min after my initial exam, oral temp 101.8. SIRS criteria met: WBC, HR, temp. WBC increased to 13.8k, hgb stable.   --LR bolus 500cc  --2V CXR  --UA  --blood cultures x 2   --zosyn  --tylenol PO    DDx: I suspect her tachycardia is fever " driven. She appears euvolemic, is making good urine. Her pain has been 8/10, likely exacerbating her tachycardia. No concern for acute blood loss, hgb stable. Cannot rule out PE, if HR does not improve with fever management - would consider PE rule out.    As far as fever goes - suspect respiratory source given her abd pain, causing splinted breathing and likely atelectasis.  Low suspicion for abdominal/incisonal infection given exam - abd soft, pain is the same as it has been, incision looks good. Procal elevated to 1.68.    2V CXR - no suspicious opacities, no signs of pul edema. Some air in RUQ bowel. -->Given her widely metastatic disease, and tachycardia - will need to rule out PE.    Discussed with and defer further cares to Dr. Nicole and Dr. Ernst CR surgery.     TORRES Velazquez [facility]  Hospitalist Service - House [facility]  Pager: [phone] (7a - 6p)      Physical Exam   Vital Signs with Ranges:  Temp:  [97.6  F (36.4  C)-101.8  F (38.8  C)] 101.8  F (38.8  C)  Heart Rate:  [] 131  Resp:  [16-20] 20  BP: (127-165)/() 148/85  SpO2:  [96 %-100 %] 98 %  I/O last 3 completed shifts:  In: 1950 [P.O.:500; I.V.:1450]  Out: 2400 [Urine:2400]      Physical Exam  Vitals signs and nursing note reviewed.   Constitutional:       General: She is awake. She is not in acute distress.     Appearance: Normal appearance. She is ill-appearing. She is not toxic-appearing or diaphoretic.   HENT:      Head: Normocephalic and atraumatic.   Eyes:      Pupils: Pupils are equal, round, and reactive to light.   Cardiovascular:      Rate and Rhythm: Regular rhythm. Tachycardia present.      Heart sounds: Normal heart sounds. Heart sounds not distant. No murmur.   Pulmonary:      Effort: Pulmonary effort is normal.      Breath sounds: Decreased air movement present. Examination of the right-upper field reveals decreased breath sounds. Examination of the left-upper field reveals decreased breath sounds. Examination of the  right-lower field reveals decreased breath sounds. Examination of the left-lower field reveals decreased breath sounds. Decreased breath sounds present.      Comments: shallow  Abdominal:      General: There is distension.      Palpations: Abdomen is soft.      Tenderness: There is abdominal tenderness in the right upper quadrant.          Comments: Vertical    Musculoskeletal: Normal range of motion.      Right lower leg: No edema.      Left lower leg: No edema.   Skin:     General: Skin is warm and dry.      Comments: Midline vertical incision   Neurological:      General: No focal deficit present.      Mental Status: She is alert and oriented to person, place, and time.      Cranial Nerves: Cranial nerves are intact.      Motor: Motor function is intact.   Psychiatric:         Attention and Perception: Attention normal.         Mood and Affect: Mood normal.         Behavior: Behavior is cooperative.        Data     EKG:  Interpreted by TORRES Velazquez CNP  Time reviewed: 1443  Symptoms at time of EKG: None   Rhythm: sinus tachycardia  Rate: 130-140  Axis: Normal  Ectopy: none  Conduction: normal  ST Segments/ T Waves: No ST-T wave changes and No acute ischemic changes  Q Waves: none  Comparison to prior: rate change    Clinical Impression: normal EKG    IMAGING: (X-ray/CT/MRI)   Recent Results (from the past 24 hour(s))   XR Chest 2 Views    Narrative    XR CHEST 2 VW   7/31/2020 4:23 PM     HISTORY: fever, shallow breathing    COMPARISON: None available      Impression    IMPRESSION: AP and lateral views of the chest were obtained. Cardiac  mediastinal silhouette is within normal limits. No suspicious focal  pulmonary opacities. No significant pleural effusion or pneumothorax.  Mildly dilated air-filled colonic loops in the left upper quadrant.  Air under right hemidiaphragm, likely within within the bowel loop  versus less likely free peritoneal air, can be confirmed with  abdominal CT if clinically  warranted.    EBONY POE MD       CBC with Diff:  Recent Labs   Lab Test 07/31/20  1109 07/30/20  0721  07/29/20  0918   WBC  --  9.3  --   --    HGB 8.9* 8.6*   < >  --    MCV  --  83  --   --    PLT  --  185  --   --    INR  --   --   --  1.27*    < > = values in this interval not displayed.     Lactic Acid:  0.7    Comprehensive Metabolic Panel:  Recent Labs   Lab 07/28/20  0731      POTASSIUM 4.7   CHLORIDE 108   CO2 24   ANIONGAP 4   *   BUN 9   CR 0.74   GFRESTIMATED >90   GFRESTBLACK >90   IRON 8.1*       INR:    Recent Labs   Lab Test 07/29/20  0918   INR 1.27*     UA:  No results for input(s): COLOR, APPEARANCE, URINEGLC, URINEBILI, URINEKETONE, SG, UBLD, URINEPH, PROTEIN, UROBILINOGEN, NITRITE, LEUKEST, RBCU, WBCU in the last 168 hours.    Time Spent on this Encounter     I spent 60 minutes (1407 - 1507) of critical care time on the unit/floor managing the care of Jefe Blanc. Upon evaluation, this patient had a high probability of imminent or life-threatening deterioration due to sepsis which required my direct attention, intervention, and personal management. 100% of my time was spent at the bedside counseling the patient and/or coordinating care regarding services listed in this note.

## 2020-07-31 NOTE — PLAN OF CARE
POD #4 RSO, omentectomy, hemicolectomy tumor debulking etc. LS clear, BP elevated, tachycardic (RRT called-see note) tmax 101.8 Pain managed with IV Dilaudid, now ordered scheduled Tylenol. Full liquid diet-poor appetite.BS hypoactive, pt had x2 large BRB w/ dark brown BM per rectum (NP informed-order to monitor HgB in AM) Simethicone PRN for gas. UA(-), CXR(-) BC pending IVF LR (bolus received) w/ int ABX. Up a1+w+gb to BR. Pt needs a lot of encouragement to ambulate. Midline incision intact w/ staples-no s/s of infection. WBC continue to elevated, procal elevated, suspect infection. Plan: CT w/ contract to PE, anastomosis leak, bowel leak

## 2020-07-31 NOTE — PROGRESS NOTES
"[facility]  Hospitalist Progress Note          Assessment and Plan:   Metastatic appendiceal carcnoma:  POD 4 Diagnostic laparoscopy, supracervical hysterectomy, RSO, omentectomy, tumor debulking, right hemicoloectomy (Dr. Prakash Ernst) with ileotransverse enterocolostomy for  primary appendiceal carcinoma.  She had small maroonish stool.  Having gas pains and somewhat nauseated.  Diet per colorectal surgery.  Will ask Dr. Ernst to weigh in on adjuvant therapy in this setting.  If she needs med onc consult, I can arrange through our office.      Post operative anemia: likely dilutional component. S/p 1 unit PRBC's . Monitor Hgb. Improved.      DVT Prophylaxis: Resume lovenox when Ok with colorectal. Will continue for 3 1/2 wks post op. Script sent. Patient will need teaching. Discussed with RN this morning.       Disposition:  May benefit from discharge planning.  Has 5 children (3 living with her).  She is recently  and living with her children at a friend's home. She plans to go her sisters upon discharge. Will have SW see patient. Anticipate discharge over weekend.              Interval History:   Nauseated and having gas pains this AM              Medications:   I have reviewed this patient's current medications               Physical Exam:   Blood pressure 127/85, pulse 70, temperature 97.6  F (36.4  C), temperature source Oral, resp. rate 18, height 1.6 m (5' 3\"), weight 67.6 kg (149 lb 1.6 oz), last menstrual period 2020, SpO2 98 %.        Vital Sign Ranges  Temperature Temp  Av.5  F (36.9  C)  Min: 97.6  F (36.4  C)  Max: 99.8  F (37.7  C)   Blood pressure Systolic (24hrs), Av , Min:127 , Max:133        Diastolic (24hrs), Av, Min:80, Max:94      Pulse No data recorded   Respirations Resp  Av.6  Min: 16  Max: 18   Pulse oximetry SpO2  Av %  Min: 98 %  Max: 100 %         Intake/Output Summary (Last 24 hours) at 2020 0930  Last data filed at " 7/31/2020 0900  Gross per 24 hour   Intake 1950 ml   Output 2650 ml   Net -700 ml       Lungs:   Clear to auscultation     Cardiovascular:   normal apical pulses      Abdomen:   Hypoactive BS, minimally distended, incision clean     Musculoskeletal:   no lower extremity pitting edema present                Data:   All laboratory data reviewed

## 2020-07-31 NOTE — PROGRESS NOTES
CRS Staff  Seen and examined this afternoon with Megan Barahona who saw her this morning.  Unfortunately has developed fever and tachycardia.  RRT called.  Overall feels about the same as this morning but some nausea.  Had some bloody bowel movements.  Exam shows her to be soft, tender throughout, no obvious peritoneal signs.  She is tachycardic.  Will obtain stat CT chest, abdomen, and pelvis and follow up.    Prakash Ernst MD  [facility]  [phone] (office)  [phone] (pager)  [email or web address]

## 2020-07-31 NOTE — PLAN OF CARE
Patient is POD4. Patient is A&Ox4. VSS ex TMax 99.8, afebrile overnight. C/o nausea, gave Zofran x1. C/o incisional pain, gave Norco x1. C/o heart burn, gave TUMs x1. L PIV infusing IVF at 50 ml/hr. Up assist of 1 and gait belt to bathroom. Hypoactive bowel sounds. Had small maroonish BM, some flatus with BM. Incision with staples, open to air. 2 port sites with steri strips. Calls appropriately.

## 2020-07-31 NOTE — PROGRESS NOTES
RRT called. Pt had auscultated , Tmax 99.8-oral and 101.8-axillary. BP elevated 146/93.  Labs collected, WBC elevated 13.8, Hgb 9.2. lactic 0.7.  Pain given simithecone for gas pain, w/ some relief. Dilaudid w/ relief.  UA, CXR and LR fluids ordered.  PRATIK Mercado to update Dr Nicole.

## 2020-08-01 ENCOUNTER — APPOINTMENT (OUTPATIENT)
Dept: CT IMAGING | Facility: CLINIC | Age: 40
End: 2020-08-01
Attending: COLON & RECTAL SURGERY
Payer: MEDICAID

## 2020-08-01 LAB
BASOPHILS # BLD AUTO: 0 10E9/L (ref 0–0.2)
BASOPHILS NFR BLD AUTO: 0.1 %
DIFFERENTIAL METHOD BLD: ABNORMAL
EOSINOPHIL # BLD AUTO: 0.1 10E9/L (ref 0–0.7)
EOSINOPHIL NFR BLD AUTO: 0.8 %
ERYTHROCYTE [DISTWIDTH] IN BLOOD BY AUTOMATED COUNT: 14.3 % (ref 10–15)
GRAM STN SPEC: NORMAL
GRAM STN SPEC: NORMAL
HCT VFR BLD AUTO: 24.9 % (ref 35–47)
HGB BLD-MCNC: 8.4 G/DL (ref 11.7–15.7)
IMM GRANULOCYTES # BLD: 0.1 10E9/L (ref 0–0.4)
IMM GRANULOCYTES NFR BLD: 0.4 %
INR PPP: 1.34 (ref 0.86–1.14)
LACTATE BLD-SCNC: 0.9 MMOL/L (ref 0.7–2)
LYMPHOCYTES # BLD AUTO: 0.9 10E9/L (ref 0.8–5.3)
LYMPHOCYTES NFR BLD AUTO: 5.8 %
MCH RBC QN AUTO: 27.4 PG (ref 26.5–33)
MCHC RBC AUTO-ENTMCNC: 33.7 G/DL (ref 31.5–36.5)
MCV RBC AUTO: 81 FL (ref 78–100)
MONOCYTES # BLD AUTO: 0.9 10E9/L (ref 0–1.3)
MONOCYTES NFR BLD AUTO: 5.4 %
NEUTROPHILS # BLD AUTO: 13.8 10E9/L (ref 1.6–8.3)
NEUTROPHILS NFR BLD AUTO: 87.5 %
NRBC # BLD AUTO: 0 10*3/UL
NRBC BLD AUTO-RTO: 0 /100
PLATELET # BLD AUTO: 269 10E9/L (ref 150–450)
RBC # BLD AUTO: 3.07 10E12/L (ref 3.8–5.2)
SPECIMEN SOURCE: NORMAL
WBC # BLD AUTO: 15.7 10E9/L (ref 4–11)

## 2020-08-01 PROCEDURE — 87070 CULTURE OTHR SPECIMN AEROBIC: CPT | Performed by: OBSTETRICS & GYNECOLOGY

## 2020-08-01 PROCEDURE — 25000128 H RX IP 250 OP 636: Performed by: RADIOLOGY

## 2020-08-01 PROCEDURE — 83605 ASSAY OF LACTIC ACID: CPT | Performed by: OBSTETRICS & GYNECOLOGY

## 2020-08-01 PROCEDURE — 85610 PROTHROMBIN TIME: CPT | Performed by: OBSTETRICS & GYNECOLOGY

## 2020-08-01 PROCEDURE — 0W9H30Z DRAINAGE OF RETROPERITONEUM WITH DRAINAGE DEVICE, PERCUTANEOUS APPROACH: ICD-10-PCS | Performed by: RADIOLOGY

## 2020-08-01 PROCEDURE — 87205 SMEAR GRAM STAIN: CPT | Performed by: OBSTETRICS & GYNECOLOGY

## 2020-08-01 PROCEDURE — 49406 IMAGE CATH FLUID PERI/RETRO: CPT

## 2020-08-01 PROCEDURE — 85025 COMPLETE CBC W/AUTO DIFF WBC: CPT | Performed by: OBSTETRICS & GYNECOLOGY

## 2020-08-01 PROCEDURE — 36415 COLL VENOUS BLD VENIPUNCTURE: CPT | Performed by: OBSTETRICS & GYNECOLOGY

## 2020-08-01 PROCEDURE — 87076 CULTURE ANAEROBE IDENT EACH: CPT | Performed by: OBSTETRICS & GYNECOLOGY

## 2020-08-01 PROCEDURE — 25000128 H RX IP 250 OP 636: Performed by: NURSE PRACTITIONER

## 2020-08-01 PROCEDURE — 25000132 ZZH RX MED GY IP 250 OP 250 PS 637: Performed by: NURSE PRACTITIONER

## 2020-08-01 PROCEDURE — 12000000 ZZH R&B MED SURG/OB

## 2020-08-01 PROCEDURE — 87075 CULTR BACTERIA EXCEPT BLOOD: CPT | Performed by: OBSTETRICS & GYNECOLOGY

## 2020-08-01 PROCEDURE — 25000125 ZZHC RX 250: Performed by: OBSTETRICS & GYNECOLOGY

## 2020-08-01 RX ORDER — FLUMAZENIL 0.1 MG/ML
0.2 INJECTION, SOLUTION INTRAVENOUS
Status: DISCONTINUED | OUTPATIENT
Start: 2020-08-01 | End: 2020-08-01

## 2020-08-01 RX ORDER — LIDOCAINE 40 MG/G
CREAM TOPICAL
Status: CANCELLED | OUTPATIENT
Start: 2020-08-01

## 2020-08-01 RX ORDER — NALOXONE HYDROCHLORIDE 0.4 MG/ML
.1-.4 INJECTION, SOLUTION INTRAMUSCULAR; INTRAVENOUS; SUBCUTANEOUS
Status: DISCONTINUED | OUTPATIENT
Start: 2020-08-01 | End: 2020-08-01

## 2020-08-01 RX ORDER — FENTANYL CITRATE 50 UG/ML
25-50 INJECTION, SOLUTION INTRAMUSCULAR; INTRAVENOUS EVERY 5 MIN PRN
Status: DISCONTINUED | OUTPATIENT
Start: 2020-08-01 | End: 2020-08-01

## 2020-08-01 RX ADMIN — HYDROMORPHONE HYDROCHLORIDE 0.5 MG: 1 INJECTION, SOLUTION INTRAMUSCULAR; INTRAVENOUS; SUBCUTANEOUS at 00:41

## 2020-08-01 RX ADMIN — HYDROMORPHONE HYDROCHLORIDE 0.5 MG: 1 INJECTION, SOLUTION INTRAMUSCULAR; INTRAVENOUS; SUBCUTANEOUS at 22:15

## 2020-08-01 RX ADMIN — HYDROMORPHONE HYDROCHLORIDE 0.5 MG: 1 INJECTION, SOLUTION INTRAMUSCULAR; INTRAVENOUS; SUBCUTANEOUS at 05:52

## 2020-08-01 RX ADMIN — FENTANYL CITRATE 50 MCG: 50 INJECTION, SOLUTION INTRAMUSCULAR; INTRAVENOUS at 13:11

## 2020-08-01 RX ADMIN — PIPERACILLIN SODIUM AND TAZOBACTAM SODIUM 4.5 G: 4; .5 INJECTION, POWDER, LYOPHILIZED, FOR SOLUTION INTRAVENOUS at 05:40

## 2020-08-01 RX ADMIN — ACETAMINOPHEN 650 MG: 325 TABLET, FILM COATED ORAL at 22:15

## 2020-08-01 RX ADMIN — PROCHLORPERAZINE EDISYLATE 10 MG: 5 INJECTION INTRAMUSCULAR; INTRAVENOUS at 07:29

## 2020-08-01 RX ADMIN — PIPERACILLIN SODIUM AND TAZOBACTAM SODIUM 4.5 G: 4; .5 INJECTION, POWDER, LYOPHILIZED, FOR SOLUTION INTRAVENOUS at 22:53

## 2020-08-01 RX ADMIN — PIPERACILLIN SODIUM AND TAZOBACTAM SODIUM 4.5 G: 4; .5 INJECTION, POWDER, LYOPHILIZED, FOR SOLUTION INTRAVENOUS at 16:22

## 2020-08-01 RX ADMIN — MIDAZOLAM HYDROCHLORIDE 1 MG: 1 INJECTION, SOLUTION INTRAMUSCULAR; INTRAVENOUS at 13:11

## 2020-08-01 RX ADMIN — HYDROMORPHONE HYDROCHLORIDE 0.5 MG: 1 INJECTION, SOLUTION INTRAMUSCULAR; INTRAVENOUS; SUBCUTANEOUS at 16:22

## 2020-08-01 RX ADMIN — ACETAMINOPHEN 975 MG: 325 TABLET, FILM COATED ORAL at 07:29

## 2020-08-01 RX ADMIN — HYDROMORPHONE HYDROCHLORIDE 0.5 MG: 1 INJECTION, SOLUTION INTRAMUSCULAR; INTRAVENOUS; SUBCUTANEOUS at 11:10

## 2020-08-01 RX ADMIN — LIDOCAINE HYDROCHLORIDE 30 ML: 10 INJECTION, SOLUTION EPIDURAL; INFILTRATION; INTRACAUDAL; PERINEURAL at 13:29

## 2020-08-01 RX ADMIN — PROCHLORPERAZINE EDISYLATE 10 MG: 5 INJECTION INTRAMUSCULAR; INTRAVENOUS at 22:20

## 2020-08-01 RX ADMIN — PIPERACILLIN SODIUM AND TAZOBACTAM SODIUM 4.5 G: 4; .5 INJECTION, POWDER, LYOPHILIZED, FOR SOLUTION INTRAVENOUS at 11:08

## 2020-08-01 ASSESSMENT — ACTIVITIES OF DAILY LIVING (ADL)
ADLS_ACUITY_SCORE: 15
ADLS_ACUITY_SCORE: 16
ADLS_ACUITY_SCORE: 15
ADLS_ACUITY_SCORE: 16

## 2020-08-01 ASSESSMENT — MIFFLIN-ST. JEOR: SCORE: 1324.54

## 2020-08-01 NOTE — PROGRESS NOTES
RRT called this afternoon for tachycardia and fever.  WBC 16.5.  Treated with Zosyn after appropriate cultures obtained.   RN reports two frankly bloody stools this afternoon.  CT C/A/P obtained and shows  1.  A 10.4 cm rim-enhancing pelvic fluid collection superior to the  urinary bladder contains a few scattered air bubbles, and is highly  suspicious for abscess.  2.  Mild bowel wall thickening in the sigmoid colon could be reactive  and related to the adjacent fluid collection, although a focal colitis  cannot be excluded.  3.  There is a moderate to large amount of gas and fluid throughout  the colon, with an abrupt transition in the rectum. A low colonic  obstruction could have this appearance. Please clinically correlate.  4.  Postoperative changes of recent right hemicolectomy are noted.  5.  Trace bilateral pleural effusions, with mild associated  atelectasis in both lower lungs posteriorly.  6.  Enlarged multinodular thyroid, with the largest individual nodule  on the left measuring 2.7 cm. Thyroid ultrasound may be helpful for  further characterization.   Abdominal exam shows no distension, BS present and no guarding on palpation, no rebound.  Discussed with Dr. Ernst and with patient and her Mother.  Will obtain IR drain placement into pelvic fluid for gm stain and culture.  Continue Zosyn.  Will get Hgb tonight to make sure not dropping with acutely bloody stools this afternoon.    CINDY Nicole MD  [phone]

## 2020-08-01 NOTE — PROGRESS NOTES
"[facility]  Hospitalist Progress Note          Assessment and Plan:     Metastatic appendiceal carcinoma:  POD 5 Diagnostic laparoscopy, supracervical hysterectomy, RSO, omentectomy, tumor debulking, right hemicoloectomy (Dr. Prakash Ernst) with ileotransverse enterocolostomy for  primary appendiceal carcinoma.  She is having liquid stool.  Awaiting IR drain placement for large rim enhancing fluid collection in setting of fever, tachycardia and leukocytosis yesterday.  WBC slightly better and afebrile today on Zosyn.   Diet per colorectal surgery.  Will ask Dr. Ernst to weigh in on adjuvant therapy in this setting.  If she needs med onc consult, I can arrange through our office.      Post operative anemia: likely dilutional component. S/p 1 unit PRBC's . Monitor Hgb.      DVT Prophylaxis: Resume lovenox when Ok with colorectal. Will continue for 3 1/2 wks post op. Script sent. Patient will need teaching. Discussed with RN this morning.       Disposition:  May benefit from discharge planning.  Has 5 children (3 living with her).  She is recently  and living with her children at a friend's home. She plans to go her sisters upon discharge. Will have SW see patient. Anticipate discharge over weekend.                Interval History:   Still with abdominal pain.  Liquid bowel movements.              Medications:   I have reviewed this patient's current medications               Physical Exam:   Blood pressure 119/63, pulse 107, temperature 97.4  F (36.3  C), temperature source Oral, resp. rate 18, height 1.6 m (5' 3\"), weight 67.5 kg (148 lb 14.4 oz), last menstrual period 2020, SpO2 99 %.        Vital Sign Ranges  Temperature Temp  Av.7  F (37.1  C)  Min: 97  F (36.1  C)  Max: 101.8  F (38.8  C)   Blood pressure Systolic (24hrs), Av , Min:118 , Max:165        Diastolic (24hrs), Av, Min:63, Max:100      Pulse Pulse  Av.7  Min: 107  Max: 124   Respirations Resp  Avg: " 19.3  Min: 16  Max: 20   Pulse oximetry SpO2  Av.9 %  Min: 96 %  Max: 100 %         Intake/Output Summary (Last 24 hours) at 2020 1039  Last data filed at 2020 0632  Gross per 24 hour   Intake 897.5 ml   Output 100 ml   Net 797.5 ml       Lungs:   Clear to auscultation     Cardiovascular:   Normal apical impulse, regular rate and rhythm, normal S1 and S2, no S3 or S4, and no murmur noted     Abdomen:   Active BS, non-distended     Musculoskeletal:   no lower extremity pitting edema present                Data:   All laboratory data reviewed

## 2020-08-01 NOTE — PROGRESS NOTES
COLON & RECTAL [facility]ERY  PROGRESS NOTE    August 1, 2020  Post-op Day #5 diagnostic laparoscopy, supracervical hysterectomy, RSO, omentectomy, tumor debulking (Dr Nicole), right hemicoloectomy (Dr. 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     SUBJECTIVE:  RRT called last evening for tachycardia and febrile, WBC elevated. CT c/a/p with fluid collection in pelvis, plan for IR drain after 0900 today. More loose stools overnight, dark in color, no bright red blood. -flatus. Belching.     OBJECTIVE:  Temp:  [97  F (36.1  C)-101.8  F (38.8  C)] 97.4  F (36.3  C)  Pulse:  [107-124] 107  Heart Rate:  [110-143] 110  Resp:  [16-20] 18  BP: (118-165)/() 119/63  SpO2:  [96 %-100 %] 99 %    Intake/Output Summary (Last 24 hours) at 8/1/2020 1004  Last data filed at 8/1/2020 0632  Gross per 24 hour   Intake 897.5 ml   Output 100 ml   Net 797.5 ml       GENERAL:  Awake, alert, no acute distress, lying in bed  HEAD: Normocephalic atraumatic  SCLERA: Anicteric  EXTREMITIES: Warm and well perfused  ABDOMEN:  Soft, appropriately tender, non-distended. No guarding, rigidity, or peritoneal signs.   INCISION:  C/d/i    LABS:  Lab Results   Component Value Date    WBC 15.7 08/01/2020     Lab Results   Component Value Date    HGB 8.4 08/01/2020     Lab Results   Component Value Date    HCT 24.9 08/01/2020     Lab Results   Component Value Date     08/01/2020     Last Basic Metabolic Panel:  Lab Results   Component Value Date     07/31/2020      Lab Results   Component Value Date    POTASSIUM 3.6 07/31/2020     Lab Results   Component Value Date    CHLORIDE 105 07/31/2020     Lab Results   Component Value Date    IRON 8.8 07/31/2020     Lab Results   Component Value Date    CO2 26 07/31/2020     Lab Results   Component Value Date    BUN 14 07/31/2020     Lab Results   Component Value Date    CR 0.67 07/31/2020     Lab Results   Component Value Date     07/31/2020       ASSESSMENT/PLAN: 40 year old female  POD#3 s/p diagnostic laparoscopy, supracervical hysterectomy, RSO, omentectomy, tumor debulking (Dr Nicole) and right hemicoloectomy (Dr. 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 Now with pelvic fluid collection, plan for IR drainage today. Hgb stable at 8.4 this morning.      1. NPO diet  2. Prn pain meds  3. Continue empiric antibiotics, will get cultures from procedure today and narrow antibiotics based on results.   4. Continue IVF  5. Continue holding Lovenox and Toradol    Will discuss with Dr. Ernst.     For questions/paging, please contact the CRS office at [phone].    Megan Barahona PA-C  [facility]  Phone: [phone]      CRS Staff.  Seen and examined with Megan Barahona.  Agree with above.    Had a bm today, non bloody.  Still burping.  Vitals and labs improved.  Plan for IR drain today.    I performed a history and physical examination of the patient and discussed their management with the physician assistant. I reviewed the physician assistants note and agree with the documented findings and plan of care.     Prakash Ernst MD  [facility]  [phone] (office)  [phone] (pager)  [email or web address]

## 2020-08-01 NOTE — PROCEDURES
[facility]    Procedure: IR Procedure Note    Date/Time: 8/1/2020 1:29 PM  Performed by: Nikolai Kirkpatrick MD  Authorized by: Nikolai Kirkpatrick MD     UNIVERSAL PROTOCOL   Site Marked: NA  Prior Images Obtained and Reviewed:  Yes  Required items: Required blood products, implants, devices and special equipment available    Patient identity confirmed:  Verbally with patient, arm band, provided demographic data and hospital-assigned identification number  Patient was reevaluated immediately before administering moderate or deep sedation or anesthesia  Confirmation Checklist:  Patient's identity using two indicators, relevant allergies, procedure was appropriate and matched the consent or emergent situation and correct equipment/implants were available  Time out: Immediately prior to the procedure a time out was called    Universal Protocol: the Joint Commission Universal Protocol was followed    Preparation: Patient was prepped and draped in usual sterile fashion           ANESTHESIA    Anesthesia: Local infiltration  Local Anesthetic:  Lidocaine 1% without epinephrine      SEDATION    Patient Sedated: Yes    Vital signs: Vital signs monitored during sedation    See dictated procedure note for full details.  Findings: Successful pelvic drain placement    Specimens: fluid and/or tissue for gram stain and culture    Complications: None    Condition: Stable    Plan: To EDWIGE bulb drainage    PROCEDURE   Patient Tolerance:  Patient tolerated the procedure well with no immediate complications    Length of time physician/provider present for 1:1 monitoring during sedation: 15

## 2020-08-01 NOTE — PROGRESS NOTES
RN contacted on-call IR MD to verify that drain will be placed today per order by Dr. Ernst/ Dr. Nicole for pelvic abscess     Dr Bellamy states he will be here around 1130 to place drain. Pt informed

## 2020-08-01 NOTE — PLAN OF CARE
Pt A/O x4, slightly lethargic with pain management following pelvic drain placement. Has PRN Tylenol, Dilaudid. Compazine x1 for nausea. VSS ex Tachy 110's, afebrile this shift. LS clear/dim; needs encouragement for cough/deep breathe. Up A1 to BR. Good UOP, loose dark brown stools-no meghan blood noted. x2 R arm IV's. LR @ 75/hr  w/ int ABX. Midline incision with staples-intact; ice and ABD binder for comfort. Diet advanced to fulls. RLQ pelvic drain to suction with copious amounts serosanguineous output. Fluid cultures pending. Lactic 0.9 Plan: Monitor fever curve, drain output

## 2020-08-01 NOTE — PLAN OF CARE
VSS on RA. C/o pain in abd, tylenol and dilaudid given with relief along with ice pack. Up with SBA to BSC, voiding well. 2 medium, liquid stools noted; black/brown in color. Right PIV x2, intact and patent. BS hypoactive, but audible. Midline incision CDI. CT C/A/P showed abscess, plan for drain placement after 0900 today.

## 2020-08-01 NOTE — PRE-PROCEDURE
GENERAL PRE-PROCEDURE:   Procedure:  Ct drain  Date/Time:  8/1/2020 12:08 PM    Verbal consent obtained?: Yes    Written consent obtained?: Yes    Risks and benefits: Risks, benefits and alternatives were discussed    Consent given by:  Patient  Patient states understanding of procedure being performed: Yes    Patient's understanding of procedure matches consent: Yes    Procedure consent matches procedure scheduled: Yes    Expected level of sedation:  Moderate  Appropriately NPO:  Yes  ASA Class:  Class 2- mild systemic disease, no acute problems, no functional limitations  Mallampati  :  Grade 2- soft palate, base of uvula, tonsillar pillars, and portion of posterior pharyngeal wall visible  Lungs:  Lungs clear with good breath sounds bilaterally  Heart:  Normal heart sounds and rate  History & Physical reviewed:  History and physical reviewed and no updates needed  Statement of review:  I have reviewed the lab findings, diagnostic data, medications, and the plan for sedation

## 2020-08-02 LAB
BASOPHILS # BLD AUTO: 0 10E9/L (ref 0–0.2)
BASOPHILS NFR BLD AUTO: 0.2 %
DIFFERENTIAL METHOD BLD: ABNORMAL
EOSINOPHIL # BLD AUTO: 0.2 10E9/L (ref 0–0.7)
EOSINOPHIL NFR BLD AUTO: 1.8 %
ERYTHROCYTE [DISTWIDTH] IN BLOOD BY AUTOMATED COUNT: 14.5 % (ref 10–15)
HCT VFR BLD AUTO: 21 % (ref 35–47)
HGB BLD-MCNC: 7.2 G/DL (ref 11.7–15.7)
IMM GRANULOCYTES # BLD: 0.1 10E9/L (ref 0–0.4)
IMM GRANULOCYTES NFR BLD: 0.6 %
LACTATE BLD-SCNC: 0.8 MMOL/L (ref 0.7–2)
LYMPHOCYTES # BLD AUTO: 1.5 10E9/L (ref 0.8–5.3)
LYMPHOCYTES NFR BLD AUTO: 12.4 %
MCH RBC QN AUTO: 27.7 PG (ref 26.5–33)
MCHC RBC AUTO-ENTMCNC: 34.3 G/DL (ref 31.5–36.5)
MCV RBC AUTO: 81 FL (ref 78–100)
MONOCYTES # BLD AUTO: 0.6 10E9/L (ref 0–1.3)
MONOCYTES NFR BLD AUTO: 4.9 %
NEUTROPHILS # BLD AUTO: 9.8 10E9/L (ref 1.6–8.3)
NEUTROPHILS NFR BLD AUTO: 80.1 %
NRBC # BLD AUTO: 0 10*3/UL
NRBC BLD AUTO-RTO: 0 /100
PLATELET # BLD AUTO: 282 10E9/L (ref 150–450)
RBC # BLD AUTO: 2.6 10E12/L (ref 3.8–5.2)
WBC # BLD AUTO: 12.2 10E9/L (ref 4–11)

## 2020-08-02 PROCEDURE — 25000128 H RX IP 250 OP 636: Performed by: NURSE PRACTITIONER

## 2020-08-02 PROCEDURE — 25000128 H RX IP 250 OP 636: Performed by: COLON & RECTAL SURGERY

## 2020-08-02 PROCEDURE — 25000132 ZZH RX MED GY IP 250 OP 250 PS 637: Performed by: NURSE PRACTITIONER

## 2020-08-02 PROCEDURE — 83605 ASSAY OF LACTIC ACID: CPT | Performed by: OBSTETRICS & GYNECOLOGY

## 2020-08-02 PROCEDURE — 25800030 ZZH RX IP 258 OP 636: Performed by: NURSE PRACTITIONER

## 2020-08-02 PROCEDURE — 85025 COMPLETE CBC W/AUTO DIFF WBC: CPT | Performed by: OBSTETRICS & GYNECOLOGY

## 2020-08-02 PROCEDURE — 25800030 ZZH RX IP 258 OP 636: Performed by: PHYSICIAN ASSISTANT

## 2020-08-02 PROCEDURE — 12000000 ZZH R&B MED SURG/OB

## 2020-08-02 PROCEDURE — 36415 COLL VENOUS BLD VENIPUNCTURE: CPT | Performed by: OBSTETRICS & GYNECOLOGY

## 2020-08-02 RX ADMIN — ENOXAPARIN SODIUM 40 MG: 40 INJECTION SUBCUTANEOUS at 11:42

## 2020-08-02 RX ADMIN — PIPERACILLIN SODIUM AND TAZOBACTAM SODIUM 4.5 G: 4; .5 INJECTION, POWDER, LYOPHILIZED, FOR SOLUTION INTRAVENOUS at 10:28

## 2020-08-02 RX ADMIN — PROCHLORPERAZINE EDISYLATE 10 MG: 5 INJECTION INTRAMUSCULAR; INTRAVENOUS at 08:34

## 2020-08-02 RX ADMIN — PROCHLORPERAZINE EDISYLATE 10 MG: 5 INJECTION INTRAMUSCULAR; INTRAVENOUS at 19:18

## 2020-08-02 RX ADMIN — HYDROCODONE BITARTRATE AND ACETAMINOPHEN 1 TABLET: 5; 325 TABLET ORAL at 08:34

## 2020-08-02 RX ADMIN — PIPERACILLIN SODIUM AND TAZOBACTAM SODIUM 4.5 G: 4; .5 INJECTION, POWDER, LYOPHILIZED, FOR SOLUTION INTRAVENOUS at 17:11

## 2020-08-02 RX ADMIN — HYDROMORPHONE HYDROCHLORIDE 0.5 MG: 1 INJECTION, SOLUTION INTRAMUSCULAR; INTRAVENOUS; SUBCUTANEOUS at 20:26

## 2020-08-02 RX ADMIN — SODIUM CHLORIDE, POTASSIUM CHLORIDE, SODIUM LACTATE AND CALCIUM CHLORIDE: 600; 310; 30; 20 INJECTION, SOLUTION INTRAVENOUS at 19:20

## 2020-08-02 RX ADMIN — SODIUM CHLORIDE, POTASSIUM CHLORIDE, SODIUM LACTATE AND CALCIUM CHLORIDE: 600; 310; 30; 20 INJECTION, SOLUTION INTRAVENOUS at 02:00

## 2020-08-02 RX ADMIN — PIPERACILLIN SODIUM AND TAZOBACTAM SODIUM 4.5 G: 4; .5 INJECTION, POWDER, LYOPHILIZED, FOR SOLUTION INTRAVENOUS at 06:13

## 2020-08-02 RX ADMIN — SIMETHICONE 80 MG: 80 TABLET, CHEWABLE ORAL at 20:26

## 2020-08-02 RX ADMIN — PIPERACILLIN SODIUM AND TAZOBACTAM SODIUM 4.5 G: 4; .5 INJECTION, POWDER, LYOPHILIZED, FOR SOLUTION INTRAVENOUS at 23:27

## 2020-08-02 RX ADMIN — HYDROCODONE BITARTRATE AND ACETAMINOPHEN 1 TABLET: 5; 325 TABLET ORAL at 19:18

## 2020-08-02 ASSESSMENT — ACTIVITIES OF DAILY LIVING (ADL)
ADLS_ACUITY_SCORE: 16

## 2020-08-02 ASSESSMENT — MIFFLIN-ST. JEOR: SCORE: 1332.7

## 2020-08-02 NOTE — PLAN OF CARE
Pt A/O x4, tmax 993, 's. Pt demonstrates appropriate use of I.S. LS remain dim-encourage cough/deep breathe. Pain to ABD area managed with Norco. IV compazine for nausea. Midline incision intact with staples. Ice and ABD binder for comfort. Stool remain dark brown-increase in bulk to more loose than liquid. No meghan blood noted. Good UOP. Lovenox resumed, teaching started. RLQ EDWIGE with serosang output. Up SBA+GB for ambulation. R AC with INF and int ABX. BC NGTD. Plan: continue to monitor s/s infection, ambulate, lovenox teaching

## 2020-08-02 NOTE — PROGRESS NOTES
COLON [facility] RECTAL [facility]  PROGRESS NOTE    August 2, 2020  Post-op Day #6    SUBJECTIVE:  Feeling much better this morning after having drain placed. Wants to eat more. +flatus and +BM, dark, non bloody. Ambulating. Pain controlled.     OBJECTIVE:  Temp:  [98.2  F (36.8  C)-100  F (37.8  C)] 99  F (37.2  C)  Pulse:  [105-119] 105  Heart Rate:  [103-116] 116  Resp:  [16] 16  BP: ()/(53-76) 133/68  SpO2:  [98 %-100 %] 99 %    Intake/Output Summary (Last 24 hours) at 8/2/2020 0839  Last data filed at 8/2/2020 0600  Gross per 24 hour   Intake 2261 ml   Output 280 ml   Net 1981 ml       GENERAL:  Awake, alert, no acute distress  HEAD: Normocephalic atraumatic  SCLERA: Anicteric  EXTREMITIES: Warm and well perfused  ABDOMEN:  Soft, appropriately tender, non-distended. No guarding, rigidity, or peritoneal signs. EDWIGE drain with serosanguinous output.   INCISION:  C/d/i    LABS:  Lab Results   Component Value Date    WBC 12.2 08/02/2020     Lab Results   Component Value Date    HGB 7.2 08/02/2020     Lab Results   Component Value Date    HCT 21.0 08/02/2020     Lab Results   Component Value Date     08/02/2020     Last Basic Metabolic Panel:  Lab Results   Component Value Date     07/31/2020      Lab Results   Component Value Date    POTASSIUM 3.6 07/31/2020     Lab Results   Component Value Date    CHLORIDE 105 07/31/2020     Lab Results   Component Value Date    IRON 8.8 07/31/2020     Lab Results   Component Value Date    CO2 26 07/31/2020     Lab Results   Component Value Date    BUN 14 07/31/2020     Lab Results   Component Value Date    CR 0.67 07/31/2020     Lab Results   Component Value Date     07/31/2020       ASSESSMENT/PLAN: 40 year old female POD#3 s/p diagnostic laparoscopy, supracervical hysterectomy, RSO, omentectomy, tumor debulking (Dr Nicole) and right hemicoloectomy (Dr. 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 Now with pelvic fluid collection, plan for IR drainage today.  Hgb stable at 8.4 this morning.      1. Okay for LFD, advised to go slow  2. Prn pain meds  3. Continue empiric antibiotics, narrow based on cultures  4. Decrease  5. Continue holding Lovenox and Toradol    Will d/w Dr. Ernst.     For questions/paging, please contact the CRS office at [phone].    Megan Barahona PA-C  [facility]  Phone: [phone]

## 2020-08-02 NOTE — PROGRESS NOTES
"[facility]  Hospitalist Progress Note          Assessment and Plan:     Metastatic appendiceal carcinoma:  POD 6 Diagnostic laparoscopy, supracervical hysterectomy, RSO, omentectomy, tumor debulking, right hemicoloectomy (Dr. Prakash Ernst) with ileotransverse enterocolostomy for  primary appendiceal carcinoma.  She is having liquid stool.  Drain placed for large rim enhancing fluid collection in setting of fever, tachycardia and leukocytosis yesterday.  WBC better and afebrile today on Zosyn.   Diet per colorectal surgery.  Will ask Dr. Ernst to weigh in on adjuvant therapy in this setting.  If she needs med onc consult, I can arrange through our office.      Post operative anemia: likely dilutional component. S/p 1 unit PRBC's . Monitor Hgb. 7.2 today. No signs of bleeding.       DVT Prophylaxis: Resume lovenox when Ok with colorectal. Will continue for 3 1/2 wks post op. Script sent. Patient will need teaching.       Disposition:  May benefit from discharge planning.  Has 5 children (3 living with her).  She is recently  and living with her children at a friend's home. She plans to go her sisters upon discharge. Will have SW see patient. Anticipate discharge early this week.     Petty Escobedo CNP  GYN ONC  Cell: [phone]             Interval History:   Pain controlled. No nausea.  Liquid bowel movements.              Medications:   I have reviewed this patient's current medications               Physical Exam:   Blood pressure 133/68, pulse 105, temperature 99  F (37.2  C), temperature source Oral, resp. rate 16, height 1.6 m (5' 3\"), weight 68.4 kg (150 lb 11.2 oz), last menstrual period 2020, SpO2 99 %.        Vital Sign Ranges  Temperature Temp  Av.7  F (37.1  C)  Min: 97  F (36.1  C)  Max: 101.8  F (38.8  C)   Blood pressure Systolic (24hrs), Av , Min:118 , Max:165        Diastolic (24hrs), Av, Min:63, Max:100      Pulse Pulse  Av.7  Min: 107  Max: 124 "   Respirations Resp  Av.3  Min: 16  Max: 20   Pulse oximetry SpO2  Av.9 %  Min: 96 %  Max: 100 %         Intake/Output Summary (Last 24 hours) at 2020 1039  Last data filed at 2020 0632  Gross per 24 hour   Intake 897.5 ml   Output 100 ml   Net 797.5 ml       Lungs:   Clear to auscultation     Cardiovascular:   Normal apical impulse, regular rate and rhythm, normal S1 and S2, no S3 or S4, and no murmur noted     Abdomen:   Active BS, non-distended, incision intact. Drain with serosanguinous drainage.      Musculoskeletal:   no lower extremity pitting edema present                Data:   All laboratory data reviewed

## 2020-08-02 NOTE — PLAN OF CARE
VSS, slight temp 100.0, down with tylenol. Up to BSC with SBA. BM's loose & black/brown. Voiding well. C/o pain in abd, relieved with ice and IV dilaudid. Right  EDWIGE output slowing down, serosanguinous output. LS diminished. BS present. Able to stand at bedside and wash-up independently.

## 2020-08-03 LAB
ABO + RH BLD: NORMAL
ABO + RH BLD: NORMAL
BLD GP AB SCN SERPL QL: NORMAL
BLD PROD TYP BPU: NORMAL
BLD PROD TYP BPU: NORMAL
BLD UNIT ID BPU: 0
BLOOD BANK CMNT PATIENT-IMP: NORMAL
BLOOD PRODUCT CODE: NORMAL
BPU ID: NORMAL
CREAT SERPL-MCNC: 0.68 MG/DL (ref 0.52–1.04)
ERYTHROCYTE [DISTWIDTH] IN BLOOD BY AUTOMATED COUNT: 14.6 % (ref 10–15)
GFR SERPL CREATININE-BSD FRML MDRD: >90 ML/MIN/{1.73_M2}
HCT VFR BLD AUTO: 20.8 % (ref 35–47)
HGB BLD-MCNC: 7 G/DL (ref 11.7–15.7)
MCH RBC QN AUTO: 27.5 PG (ref 26.5–33)
MCHC RBC AUTO-ENTMCNC: 33.7 G/DL (ref 31.5–36.5)
MCV RBC AUTO: 82 FL (ref 78–100)
NUM BPU REQUESTED: 1
PLATELET # BLD AUTO: 320 10E9/L (ref 150–450)
RBC # BLD AUTO: 2.55 10E12/L (ref 3.8–5.2)
SPECIMEN EXP DATE BLD: NORMAL
TRANSFUSION STATUS PATIENT QL: NORMAL
TRANSFUSION STATUS PATIENT QL: NORMAL
WBC # BLD AUTO: 8.7 10E9/L (ref 4–11)

## 2020-08-03 PROCEDURE — 25000128 H RX IP 250 OP 636: Performed by: NURSE PRACTITIONER

## 2020-08-03 PROCEDURE — 85027 COMPLETE CBC AUTOMATED: CPT | Performed by: NURSE PRACTITIONER

## 2020-08-03 PROCEDURE — 99232 SBSQ HOSP IP/OBS MODERATE 35: CPT | Performed by: INTERNAL MEDICINE

## 2020-08-03 PROCEDURE — 12000000 ZZH R&B MED SURG/OB

## 2020-08-03 PROCEDURE — 86850 RBC ANTIBODY SCREEN: CPT | Performed by: OBSTETRICS & GYNECOLOGY

## 2020-08-03 PROCEDURE — 25800030 ZZH RX IP 258 OP 636: Performed by: PHYSICIAN ASSISTANT

## 2020-08-03 PROCEDURE — 86923 COMPATIBILITY TEST ELECTRIC: CPT | Performed by: OBSTETRICS & GYNECOLOGY

## 2020-08-03 PROCEDURE — 36415 COLL VENOUS BLD VENIPUNCTURE: CPT | Performed by: OBSTETRICS & GYNECOLOGY

## 2020-08-03 PROCEDURE — 25000132 ZZH RX MED GY IP 250 OP 250 PS 637: Performed by: NURSE PRACTITIONER

## 2020-08-03 PROCEDURE — 40000141 ZZH STATISTIC PERIPHERAL IV START W/O US GUIDANCE

## 2020-08-03 PROCEDURE — 25000128 H RX IP 250 OP 636: Performed by: COLON & RECTAL SURGERY

## 2020-08-03 PROCEDURE — 36415 COLL VENOUS BLD VENIPUNCTURE: CPT | Performed by: NURSE PRACTITIONER

## 2020-08-03 PROCEDURE — 82565 ASSAY OF CREATININE: CPT | Performed by: OBSTETRICS & GYNECOLOGY

## 2020-08-03 PROCEDURE — P9016 RBC LEUKOCYTES REDUCED: HCPCS | Performed by: OBSTETRICS & GYNECOLOGY

## 2020-08-03 PROCEDURE — 86900 BLOOD TYPING SEROLOGIC ABO: CPT | Performed by: OBSTETRICS & GYNECOLOGY

## 2020-08-03 PROCEDURE — 86901 BLOOD TYPING SEROLOGIC RH(D): CPT | Performed by: OBSTETRICS & GYNECOLOGY

## 2020-08-03 RX ADMIN — PIPERACILLIN SODIUM AND TAZOBACTAM SODIUM 4.5 G: 4; .5 INJECTION, POWDER, LYOPHILIZED, FOR SOLUTION INTRAVENOUS at 05:56

## 2020-08-03 RX ADMIN — HYDROMORPHONE HYDROCHLORIDE 0.5 MG: 1 INJECTION, SOLUTION INTRAMUSCULAR; INTRAVENOUS; SUBCUTANEOUS at 15:32

## 2020-08-03 RX ADMIN — PIPERACILLIN SODIUM AND TAZOBACTAM SODIUM 4.5 G: 4; .5 INJECTION, POWDER, LYOPHILIZED, FOR SOLUTION INTRAVENOUS at 18:04

## 2020-08-03 RX ADMIN — PROCHLORPERAZINE EDISYLATE 10 MG: 5 INJECTION INTRAMUSCULAR; INTRAVENOUS at 15:32

## 2020-08-03 RX ADMIN — PROCHLORPERAZINE EDISYLATE 10 MG: 5 INJECTION INTRAMUSCULAR; INTRAVENOUS at 09:14

## 2020-08-03 RX ADMIN — PIPERACILLIN SODIUM AND TAZOBACTAM SODIUM 4.5 G: 4; .5 INJECTION, POWDER, LYOPHILIZED, FOR SOLUTION INTRAVENOUS at 10:19

## 2020-08-03 RX ADMIN — SODIUM CHLORIDE, POTASSIUM CHLORIDE, SODIUM LACTATE AND CALCIUM CHLORIDE: 600; 310; 30; 20 INJECTION, SOLUTION INTRAVENOUS at 23:29

## 2020-08-03 RX ADMIN — ACETAMINOPHEN 975 MG: 325 TABLET, FILM COATED ORAL at 10:19

## 2020-08-03 RX ADMIN — ENOXAPARIN SODIUM 40 MG: 40 INJECTION SUBCUTANEOUS at 10:19

## 2020-08-03 ASSESSMENT — ACTIVITIES OF DAILY LIVING (ADL)
ADLS_ACUITY_SCORE: 14
ADLS_ACUITY_SCORE: 14
ADLS_ACUITY_SCORE: 13
ADLS_ACUITY_SCORE: 15
ADLS_ACUITY_SCORE: 16
ADLS_ACUITY_SCORE: 15

## 2020-08-03 ASSESSMENT — MIFFLIN-ST. JEOR: SCORE: 1330.43

## 2020-08-03 NOTE — PROVIDER NOTIFICATION
Spoke with PRATIK Escobedo at 0805. Pt's Hgb 7.2 yesterday; no current labs ordered. TORB to order CBC  (no differential needed). Hold on giving lovenox until results of Hgb come back.

## 2020-08-03 NOTE — PROVIDER NOTIFICATION
Spoke with MARISELA Escobedo at 1244; temp 99.3; want to verify ok to initiate with blood? Also culture results showing light growth of bacteroides fragillis. Any changes? TORB, ok to proceed with blood transfusion. No changes to antibiotic regimen; await sensitivities and continue zosyn

## 2020-08-03 NOTE — PLAN OF CARE
Tmax 99.7. All other VSS. Tylenol x1 for abdominal pain. Having intermittent nausea. Compazine given x1. Appetite poor. Abdominal incision RYAN. EDWIGE patent with minimal output. No stools yet today. Hgb 7; transfusing 1 unit RBC's. Loss of IV access at 1430, blood paused; IV team working on replacing IV now so that blood can be completed in 4 hour window. Recheck Hgb in AM. Hypo BS. Some flatus early this AM. Up independently to commode. SBA for anything further. Up to chair this AM. Did not tolerate long d/t not feeling well. Continuing to encourage activity as able. On IV antibiotics. Plan pending clinical progress. Continue to monitor.

## 2020-08-03 NOTE — PROGRESS NOTES
"BRIEF NUTRITION ASSESSMENT      REASON FOR ASSESSMENT:  Jefe Blanc is a 40 year old female seen by Registered Dietitian for LOS    NUTRITION HISTORY:  Pt follows a regular diet  Notes that she is lactose intolerant    CURRENT DIET AND INTAKE:  Diet:  Low Fiber            Breeze at 10am and 2pm              Visited with pt this morning  Wanting to order some breakfast (didn't have a menu in her room)  Notes that she tried the Breeze yesterday - \"was sweet, don't think I could drink more than 1 a day\"  Intake has been limited since admit    7/27: Diagnostic laparoscopy, exploratory laparotomy, supracervical hysterectomy, right salpingo-oophorectomy, omentectomy and tumor debulking, Open right colectomy   7/31: abd CT  -  A 10.4 cm rim-enhancing pelvic fluid collection superior to the urinary bladder contains a few scattered air bubbles, and is highly suspicious for abscess.   8/1: pelvic drain placed     ANTHROPOMETRICS:  Height: 5' 3\"  Weight:(8/3) 68.1 kg /  150 lbs 3.2 oz  Body mass index is 26.61 kg/m .   Weight Status: Overweight BMI 25-29.9  IBW:  52.3 kg  %IBW: 130%  Weight History:   Wt Readings from Last 10 Encounters:   08/03/20 68.1 kg (150 lb 3.2 oz)   06/12/20 68.9 kg (152 lb)         LABS:  Labs noted    MALNUTRITION:  Patient does not meet two of the following criteria necessary for diagnosing malnutrition.     % Weight Loss:  None noted  % Intake:  </= 50% for >/= 5 days (severe malnutrition) - poor po intake since admit  Subcutaneous Fat Loss:  None observed  Muscle Loss:  None observed  Fluid Retention:  None noted    NUTRITION INTERVENTION:  Nutrition Diagnosis:  No nutrition diagnosis at this time.    Implementation:  Nutrition Education ---> Provided pt with a menu.  Reviewed low fiber diet guidelines.  Pt wishes to order Breeze prn - encouraged her to consume at least 1/day    FOLLOW UP/MONITORING:   Will re-evaluate in 7 - 10 days, or sooner, if re-consulted.          "

## 2020-08-03 NOTE — PROGRESS NOTES
COLON [facility] RECTAL [facility]GERY  PROGRESS NOTE    August 3, 2020  Post-op Day # 7 s/p diagnostic laparoscopy, supracervical hysterectomy, RSO, omentectomy, tumor debulking (Dr Nicole) and right hemicoloectomy (Dr. 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 Now with pelvic fluid collection, s/p IR drain placement 8/1    SUBJECTIVE:  Resting in bed on arrival. Passing gas and stool, no blood per rectum. Pain controlled. Drain with 15ml serosang/24hr. Has not tried low fiber diet yet given intermittent nausea.     OBJECTIVE:  Temp:  [98.7  F (37.1  C)-99.7  F (37.6  C)] 98.7  F (37.1  C)  Pulse:  [] 99  Heart Rate:  [] 94  Resp:  [16] 16  BP: (111-121)/(57-83) 119/83  SpO2:  [98 %-100 %] 99 %    Intake/Output Summary (Last 24 hours) at 8/3/2020 0940  Last data filed at 8/3/2020 0600  Gross per 24 hour   Intake 1073 ml   Output 215 ml   Net 858 ml       GENERAL:  Awake, alert, no acute distress,   HEAD: Normocephalic atraumatic  SCLERA: Anicteric  EXTREMITIES: Warm and well perfused  ABDOMEN:  Soft, appropriately tender, non-distended. No guarding, rigidity, or peritoneal signs.   INCISION:  C/d/i,     LABS:  Lab Results   Component Value Date    WBC 8.7 08/03/2020     Lab Results   Component Value Date    HGB 7.0 08/03/2020     Lab Results   Component Value Date    HCT 20.8 08/03/2020     Lab Results   Component Value Date     08/03/2020     Last Basic Metabolic Panel:  Lab Results   Component Value Date     07/31/2020      Lab Results   Component Value Date    POTASSIUM 3.6 07/31/2020     Lab Results   Component Value Date    CHLORIDE 105 07/31/2020     Lab Results   Component Value Date    IRON 8.8 07/31/2020     Lab Results   Component Value Date    CO2 26 07/31/2020     Lab Results   Component Value Date    BUN 14 07/31/2020     Lab Results   Component Value Date    CR 0.68 08/03/2020     Lab Results   Component Value Date     07/31/2020       ASSESSMENT/PLAN: 40 year old female  POD#7 s/p diagnostic laparoscopy, supracervical hysterectomy, RSO, omentectomy, tumor debulking (Dr Nicole) and right hemicoloectomy (Dr. 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 Now with pelvic fluid collection, s/p  IR drainage 8/1. Hgb stable 7 this AM.      1. Okay for LFD, advised to go slow  2. Prn pain meds  3. Continue empiric antibiotics, narrow based on cultures  4. Resume Lovenox. Recheck Hgb tomorrow AM  5. Continue drain cares per IR  6. Discussed with Dr Ernst     For questions/paging, please contact the CRS office at [phone].    Maryse Zuniga PA-C  [facility]  Phone: [phone]    CRS Staff.  Seen and examined independently and discussed with Maryse Zuniga.  Agree with above.    Getting 1 unit PRBC.  Will recheck cbc in am.  If no further bleeding, will restart toradol.  Ok for lovenox today.    I performed a history and physical examination of the patient and discussed their management with the physician assistant. I reviewed the physician assistants note and agree with the documented findings and plan of care.     Prakash Ernst MD  [facility]  [phone] (office)  [phone] (pager)  [email or web address]

## 2020-08-03 NOTE — PROGRESS NOTES
"[facility]  Hospitalist Progress Note          Assessment and Plan:     Metastatic appendiceal carcinoma:  POD 7 Diagnostic laparoscopy, supracervical hysterectomy, RSO, omentectomy, tumor debulking, right hemicoloectomy (Dr. Prakash Ernst) with ileotransverse enterocolostomy for  primary appendiceal carcinoma.  She is having liquid stool.  Drain placed for large rim enhancing fluid collection in setting of fever, tachycardia and leukocytosis. On Zosyn.   Diet per colorectal surgery.  Will ask Dr. Ernst to weigh in on adjuvant therapy in this setting.  If she needs med onc consult, I can arrange through our office.      Post operative anemia: likely dilutional component. S/p 1 unit PRBC's . Hgb. 7.0. No signs of bleeding. Transfuse 1 unit PRBC's today.       DVT Prophylaxis: Resume lovenox per colorectal. Will continue for 3 1/2 wks post op. Script sent. Patient will need teaching.       Disposition:  May benefit from discharge planning.  Has 5 children (3 living with her).  She is recently  and living with her children at a friend's home. She plans to go her sisters upon discharge. Will have SW see patient. Anticipate discharge mid week if Hgb remains stable.     Petty Escobedo CNP  GYN ONC  Cell: [phone]             Interval History:   Pain controlled. No nausea.  Liquid bowel movements. Tolerating regular diet.               Medications:   I have reviewed this patient's current medications               Physical Exam:   Blood pressure 119/83, pulse 99, temperature 98.7  F (37.1  C), temperature source Oral, resp. rate 16, height 1.6 m (5' 3\"), weight 68.1 kg (150 lb 3.2 oz), last menstrual period 2020, SpO2 99 %.        Vital Sign Ranges  Temperature Temp  Av.7  F (37.1  C)  Min: 97  F (36.1  C)  Max: 101.8  F (38.8  C)   Blood pressure Systolic (24hrs), Av , Min:118 , Max:165        Diastolic (24hrs), Av, Min:63, Max:100      Pulse Pulse  Av.7  Min: 107  " Max: 124   Respirations Resp  Av.3  Min: 16  Max: 20   Pulse oximetry SpO2  Av.9 %  Min: 96 %  Max: 100 %               Lungs:   Clear to auscultation     Cardiovascular:   Normal apical impulse, regular rate and rhythm, normal S1 and S2, no S3 or S4, and no murmur noted     Abdomen:   Active BS, non-distended, incision intact. Drain with serosanguinous drainage.      Musculoskeletal:   no lower extremity pitting edema present                Data:   All laboratory data reviewed

## 2020-08-03 NOTE — PLAN OF CARE
A&O x4. Up with SBA/ A1 to BSC. TM 99.7, other VSS.  C/o right sided chest pain radiating to shoulder, reported that it was similar to pain previously; possibly gas. Pain relieved with IV dilaudid, simethicone and ice to shoulder. BS present x4. Voiding well. Midline incision RYAN with staples intact. EDWIGE with minimal output. Plan to increase activity.

## 2020-08-04 ENCOUNTER — APPOINTMENT (OUTPATIENT)
Dept: CT IMAGING | Facility: CLINIC | Age: 40
End: 2020-08-04
Attending: OBSTETRICS & GYNECOLOGY
Payer: MEDICAID

## 2020-08-04 LAB
ERYTHROCYTE [DISTWIDTH] IN BLOOD BY AUTOMATED COUNT: 14.3 % (ref 10–15)
HCT VFR BLD AUTO: 24.9 % (ref 35–47)
HGB BLD-MCNC: 8.5 G/DL (ref 11.7–15.7)
MCH RBC QN AUTO: 28.1 PG (ref 26.5–33)
MCHC RBC AUTO-ENTMCNC: 34.1 G/DL (ref 31.5–36.5)
MCV RBC AUTO: 82 FL (ref 78–100)
PLATELET # BLD AUTO: 338 10E9/L (ref 150–450)
RBC # BLD AUTO: 3.03 10E12/L (ref 3.8–5.2)
WBC # BLD AUTO: 9.7 10E9/L (ref 4–11)

## 2020-08-04 PROCEDURE — 25000132 ZZH RX MED GY IP 250 OP 250 PS 637: Performed by: OBSTETRICS & GYNECOLOGY

## 2020-08-04 PROCEDURE — 25000125 ZZHC RX 250: Performed by: OBSTETRICS & GYNECOLOGY

## 2020-08-04 PROCEDURE — 25000128 H RX IP 250 OP 636: Performed by: COLON & RECTAL SURGERY

## 2020-08-04 PROCEDURE — 25000128 H RX IP 250 OP 636: Performed by: NURSE PRACTITIONER

## 2020-08-04 PROCEDURE — 25000128 H RX IP 250 OP 636: Performed by: OBSTETRICS & GYNECOLOGY

## 2020-08-04 PROCEDURE — 25000132 ZZH RX MED GY IP 250 OP 250 PS 637: Performed by: NURSE PRACTITIONER

## 2020-08-04 PROCEDURE — 74177 CT ABD & PELVIS W/CONTRAST: CPT

## 2020-08-04 PROCEDURE — 36415 COLL VENOUS BLD VENIPUNCTURE: CPT | Performed by: NURSE PRACTITIONER

## 2020-08-04 PROCEDURE — 25000132 ZZH RX MED GY IP 250 OP 250 PS 637: Performed by: INTERNAL MEDICINE

## 2020-08-04 PROCEDURE — 85027 COMPLETE CBC AUTOMATED: CPT | Performed by: NURSE PRACTITIONER

## 2020-08-04 PROCEDURE — 12000000 ZZH R&B MED SURG/OB

## 2020-08-04 RX ORDER — CALCIUM CARBONATE 500 MG/1
1000 TABLET, CHEWABLE ORAL EVERY 4 HOURS PRN
Status: DISCONTINUED | OUTPATIENT
Start: 2020-08-04 | End: 2020-08-05 | Stop reason: HOSPADM

## 2020-08-04 RX ORDER — IOPAMIDOL 755 MG/ML
75 INJECTION, SOLUTION INTRAVASCULAR ONCE
Status: COMPLETED | OUTPATIENT
Start: 2020-08-04 | End: 2020-08-04

## 2020-08-04 RX ADMIN — HYDROCODONE BITARTRATE AND ACETAMINOPHEN 1 TABLET: 5; 325 TABLET ORAL at 05:05

## 2020-08-04 RX ADMIN — CALCIUM CARBONATE (ANTACID) CHEW TAB 500 MG 1000 MG: 500 CHEW TAB at 02:37

## 2020-08-04 RX ADMIN — ACETAMINOPHEN 975 MG: 325 TABLET, FILM COATED ORAL at 14:11

## 2020-08-04 RX ADMIN — PIPERACILLIN SODIUM AND TAZOBACTAM SODIUM 4.5 G: 4; .5 INJECTION, POWDER, LYOPHILIZED, FOR SOLUTION INTRAVENOUS at 06:10

## 2020-08-04 RX ADMIN — PROCHLORPERAZINE EDISYLATE 10 MG: 5 INJECTION INTRAMUSCULAR; INTRAVENOUS at 01:34

## 2020-08-04 RX ADMIN — AMOXICILLIN AND CLAVULANATE POTASSIUM 1 TABLET: 875; 125 TABLET, FILM COATED ORAL at 09:43

## 2020-08-04 RX ADMIN — PIPERACILLIN SODIUM AND TAZOBACTAM SODIUM 4.5 G: 4; .5 INJECTION, POWDER, LYOPHILIZED, FOR SOLUTION INTRAVENOUS at 00:03

## 2020-08-04 RX ADMIN — AMOXICILLIN AND CLAVULANATE POTASSIUM 1 TABLET: 875; 125 TABLET, FILM COATED ORAL at 19:33

## 2020-08-04 RX ADMIN — SODIUM CHLORIDE 62 ML: 9 INJECTION, SOLUTION INTRAVENOUS at 10:01

## 2020-08-04 RX ADMIN — IOPAMIDOL 75 ML: 755 INJECTION, SOLUTION INTRAVENOUS at 10:00

## 2020-08-04 RX ADMIN — HYDROCODONE BITARTRATE AND ACETAMINOPHEN 1 TABLET: 5; 325 TABLET ORAL at 09:43

## 2020-08-04 RX ADMIN — PROCHLORPERAZINE MALEATE 10 MG: 10 TABLET ORAL at 14:11

## 2020-08-04 RX ADMIN — ENOXAPARIN SODIUM 40 MG: 40 INJECTION SUBCUTANEOUS at 11:02

## 2020-08-04 ASSESSMENT — ACTIVITIES OF DAILY LIVING (ADL)
ADLS_ACUITY_SCORE: 15
ADLS_ACUITY_SCORE: 15
ADLS_ACUITY_SCORE: 13
ADLS_ACUITY_SCORE: 15

## 2020-08-04 NOTE — CONSULTS
Consult Date:  08/04/2020      MEDICAL ONCOLOGY CONSULTATION      REASON FOR CONSULTATION:  I am asked by Dr. Nicole to see Ms. Jefe Blanc for a Medical Oncology consultation regarding recent findings of appendix carcinoma with peritoneal metastases and for recommendations regarding management.      HISTORY OF PRESENT ILLNESS:  Ms. Jefe Blanc is a 40-year-old woman who has a past medical history which is largely unremarkable.  She relates being in her usual state of health until the last several months.  During that time, she reports intermittent problems with vaginal bleeding.  The symptoms grew increasingly severe over time and she developed pain.  On 06/12/2020, she was seen in the Emergency Department for pelvic pain, at which time she was felt to have ovarian masses with secondary torsion.  She underwent left salpingo-oophorectomy and removal of a right ovarian mass.  The pathology specimen showed carcinoma and she was referred for Gynecologic Oncology treatment.  She reports no problems with nausea, vomiting or loss of weight prior to the surgery.  She was admitted to the hospital on 07/27/2020, at which time she had a diagnostic laparoscopy, exploratory laparotomy, supracervical hysterectomy, right salpingo-oophorectomy, omentectomy and right hemicolectomy.  Findings at the time of surgery included multiple tumor plaques in the pelvic peritoneum, cervix, bowel, mesenteric, omentum.  The primary tumor involving the appendix was stuck to the right ovary and tube.  Because of the nature of the procedure, Dr. Ernst was consulted and performed open right colectomy.  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  Mesenteric as well as distal colon margins were positive for carcinoma.  Perineural invasion was identified as well as lymphovascular invasion.  Four of 16 lymph nodes submitted with the  specimen were positive for carcinoma.  Additional studies performed on the tumor showed normal mismatch repair protein expression.  Proximal surgical margin was negative for malignancy.  Additional specimens showing involvement include a mesenteric nodule, omentum, uterine serosa and outer myometrium as well as the right fallopian tube and ovary and also subcutaneous nodules at other sites.  Her postoperative course has been uncomplicated to date.  She was seen earlier by Dr. Venkatesh Gunderson from the [facility].  An additional Oncology consultation is requested at this time.  At the time of the visit, she describes mild abdominal pain.  She reports no other new symptoms.      PAST MEDICAL HISTORY:   1.  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   2.  Ovary cyst.      PAST SURGICAL HISTORY:  Includes surgery on 06/12/2020 for management of ovarian torsion.        CURRENT MEDICATIONS:  Reviewed and are listed in the electronic health records.      THERE IS A HISTORY OF ALLERGY TO AZITHROMYCIN AND LACTOSE.      SOCIAL HISTORY:  She is recently .  She lives with her family.  She does not use tobacco.  She reports occasional alcohol use.      FAMILY HISTORY:  Includes a history of endometrial carcinoma in 2 aunts.  She has 1 sister who has diabetes.  She has 5 children ranging between ages 11 and 20, all of whom are well.  She has no brothers.  There is no history of cancer in her extended family except as discussed above.      REVIEW OF SYSTEMS:  Positive for the problems already mentioned.  The remainder of a 14-point review of systems is negative except as discussed above.      PHYSICAL EXAMINATION:   VITAL SIGNS:  From the time of the consultation, temperature 98.7, heart rate 85, blood pressure 132/43, respiratory rate 16 and oxygen saturation 98%.   GENERAL:  Shows surgical incision to be clean and dry.  A drain tube is present in the  right lower quadrant and is nontender.   HEENT:  Shows ears and nose unremarkable and oropharynx is clear.  Thyroid gland not palpably enlarged.  Dentition in good repair.  Pupils are round and reactive to light.   LYMPH NODES:  No palpable adenopathy in the cervical, axillary or epitrochlear regions.   CHEST:  Clear breath sounds without rales or wheezes.   BREASTS:  Not examined.   ABDOMEN:  Soft with mild tenderness along the surgical site.  Bowel sounds are quiet.   PELVIC, RECTAL:  Not performed.   EXTREMITIES:  No significant upper or lower extremity edema.  There are no areas of active arthritis.  Muscle groups are symmetric.   NEUROLOGIC:  Mood and affect as well as judgment and insight normal.  Gait not assessed.      The pathology report is as already stated.  A CBC from earlier today shows a white count of 9700, hemoglobin 8.5 and platelet count of 330,000 with an MCV of 82.      Chemistry panel from 07/31/2020 shows normal serum electrolytes, creatinine 0.67.  Albumin 2.6.  Other liver chemistry studies normal.  Procalcitonin 1.63.        CT of the abdomen and pelvis on 08/04/2020 shows trace bilateral pleural fluid.  An 8 mm low density lesion in the left hepatic lobe was noted and felt to be stable compared with the scan on 07/31/2020.  Pancreas, spleen, adrenal glands, kidneys, and bladder were felt to be normal.  There was no abdominal or pelvic lymphadenopathy.  Diffuse mesenteric edema was noted.  A peripherally enhancing fluid collection measuring 8.6 x 5.8 cm was described.  There was no abdominal aortic aneurysm.  Bladder was normal.  There were no other significant findings.      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  I reviewed the pathology findings as well as imaging reports with her.  We discussed how stage is determined and also reviewed how stage relates to prognosis and treatment  options.  I explained that goblet cell adenocarcinoma of the appendix is an unusual tumor and the treatment options are often times based on treatment given in other gastrointestinal malignancies such as colon cancer.  We discussed different options.  I explained that there is no clear role for abdominal radiation in this setting.  We discussed the use of systemic treatment.  I explained that one option would be to use of chemotherapy using a regimen such as FOLFOX.  We also discussed other options, including HIPEC.  The optimal sequencing of this treatment is unclear.  Given the unusual nature of this finding, consultation with a surgeon at the [facility] would be a reasonable option in her setting.  In addition, we discussed the rationale for sending her tumor for mutation analysis.  This was in fact discussed previously with Dr. Nicole and her tumor will be sent for mutation analysis.  We discussed timing for beginning systemic therapy.  I recommended she recover from surgery prior to initiating any form of systemic therapy.      RECOMMENDATIONS:   1.  Continue postoperative care.   2.  Send tumor sample for mutation analysis.   3.  Follow-up with Dr. Nicole as planned.  I offered to see her as well in our office in approximately 2-3 weeks.      I appreciate the chance to meet Ms. Blanc and help in her care.         GETACHEW RAMIRES MD             D: 2020   T: 2020   MT: JENNIFER      Name:     SHANIQUE BLANC   MRN:      [id number]        Account:       [id number]   :      1980           Consult Date:  2020      Document: [id number]

## 2020-08-04 NOTE — PROGRESS NOTES
Interventional Radiology Progress Note:  Inpatient at [facility]  Date: 2020   Patient name: Jefe Blanc  MRN:[id number]  :  1980    History: Jefe Blanc is a 40 year old woman who has a recent diagnosis of metastatic appendiceal carcinoma s/p POD 8 Diagnostic laparoscopy, supracervical hysterectomy, RSO, omentectomy, tumor debulking, right hemicoloectomy (Dr. Prakash Ernst) with ileotransverse enterocolostomy for  primary appendiceal carcinoma. She had leukocytosis and fevers, ct done which showed a pelvic fluid collection and a drain was placed 2020. ~ 250 mL serosanguinous fluid was drained the first day and since then she had 45 mL and 120 mL out so far today.     She is being seen in IR follow up.     Interval History: Feeling better than when I was first admitted. Starting to eat more but still mostly full liquids. Passing gas, having BMs.     Physical Exam:   Vitals Temp:  [98.1  F (36.7  C)-99.7  F (37.6  C)] 98.7  F (37.1  C)  Pulse:  [81-87] 85  Heart Rate:  [85-94] 85  Resp:  [15-17] 16  BP: (119-146)/(43-89) 132/43  SpO2:  [98 %-100 %] 98 %    Labs:  CMP  Recent Labs   Lab 20  0723 20  1728 20  1502   NA  --  137 137   POTASSIUM  --  3.6 3.6   CHLORIDE  --  105 106   CO2  --  26 27   ANIONGAP  --  6 4   GLC  --  126* 142*   BUN  --  14 14   CR 0.68 0.67 0.61   GFRESTIMATED >90 >90 >90   GFRESTBLACK >90 >90 >90   IRON  --  8.8 9.1   PROTTOTAL  --   --  6.4*   ALBUMIN  --   --  2.6*   BILITOTAL  --   --  1.7*   ALKPHOS  --   --  61   AST  --   --  16   ALT  --   --  12     CBC  Recent Labs   Lab 20  0651 20  0827 20  0723 20  0728   WBC 9.7 8.7 12.2* 15.7*   RBC 3.03* 2.55* 2.60* 3.07*   HGB 8.5* 7.0* 7.2* 8.4*   HCT 24.9* 20.8* 21.0* 24.9*   MCV 82 82 81 81   MCH 28.1 27.5 27.7 27.4   MCHC 34.1 33.7 34.3 33.7   RDW 14.3 14.6 14.5 14.3    320 282 269     INR  Recent Labs   Lab 20  0728 20  0918   INR  1.34* 1.27*     Arterial Blood GasNo lab results found in last 7 days.    Cultures:  Recent Labs   Lab 08/01/20  1315 07/31/20  2030 07/31/20  1727   CULT Light growth  Bacteroides fragilis  Susceptibility testing not routinely done  *  Culture negative monitoring continues No growth after 3 days No growth after 4 days     General: Alert, oriented, pleasant woman in no acute distress.Lying in bed.   Neuro: A&O x 3. Moves all extremities equally.    Drain:  RLQ stayfix  Dressing is C/D/I.   Tube is draining serosanguinous fluid with some tissue sediment.   The drain was flushed with a total of 20 mL NS without pain or leaking. Able to aspirate out what was flushed in.   Outputs as per HPI and Culture above.     Imaging  CT abd/pelvis done today, 8/4/2020    IMPRESSION:   1.  There is a persistent fluid collection in the pelvis with percutaneous drain. Amount of fluid subjectively appears similar to prior.     2.   Status post recent hysterectomy, right salpingo-oophorectomy,omentectomy, tumor debulking, and right hemicolectomy.     3.  Mild, likely postoperative free air and free fluid in the peritoneal cavity.    Assessment: Successful drainage of much of the pelvic fluid collection of ~ 420 mL so far since 8/1. Jefe is feeling better overall clinically.     Reviewed CT today with Dr Steele comparing it to previous CTs before drain placed and he feels the fluid collection is significantly smaller.     Plan: Will plan to add NS flushing to the drain while IP and will have her flush once a day at home to help with drainage.     20 minutes were spent with patient during today's visit with greater than 50% of the time spent face to face with the patient, in reviewing medical record and images and in counseling and coordinating patient's care.    Thanks Aleta [facility] Interventional Radiology CNP ([phone]) (phone [phone])

## 2020-08-04 NOTE — CONSULTS
Consult Date:  08/03/2020      This consult has been requested by TORRES Boggs CNP for appendiceal adenocarcinoma.      Ms. Blanc is a 40-year-old female who has been having abdominal symptoms for about 2 months.  The patient has been having abdominal discomfort and bloating.  Appetite has been decreased.  She lost some weight.      The patient presented to the emergency room on 06/12/2020 with abdominal pain.  Ultrasound pelvis revealed bilateral ovarian masses and uterine fibroids. There was concern for ovarian torsion. The patient was taken to operating room on 06/12/2020 for ovarian torsion.  She had laparoscopic left ovarian detorsion, left salpingo-oophorectomy and right ovarian cystectomy and removal of right ovarian solid mass.  Pathology from right ovary/mass revealed adenocarcinoma most consistent with goblet cell adenocarcinoma, favor metastatic.  Left ovary revealed adenocarcinoma, most consistent with goblet cell adenocarcinoma.      The patient was seen by Dr. Nicole from Gynecology Oncology.  The patient had a PET scan done in 06/2020.  It did not reveal any evidence of metastatic disease. Surgery was recommended.     The patient was taken to operating room on 07/27/2020.  The patient was found to have intra-abdominal metastatic disease.  The patient underwent a hysterectomy, right salpingo-oophorectomy, omentectomy, tumor debulking and right colectomy.   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  Margins are positive.  Lymphovascular invasion is present.  Four of 16 lymph nodes are positive.  Omentum and the small bowel mesenteric nodules are positive for carcinoma.  MMR intact.    -Pathology from uterus and right salpingo-oophorectomy reveals involvement by goblet cell adenocarcinoma consistent with metastasis of appendix origin.        The patient is recovering from surgery.  She has mild abdominal discomfort.  She has fatigue.      The  patient denies any history of cancer.  The patient says that she had been in good health.      REVIEW OF SYSTEMS:  The patient denies any headache or dizziness.  No chest pain.  No shortness of breath.  Before surgery, she lost some weight, which she blames on her stress from her recent divorce.  She had not felt any lump.      All other review of systems negative.      ALLERGIES:  REVIEWED.      MEDICATIONS:  Reviewed.      PAST MEDICAL HISTORY:  Ovarian cyst.      PAST SURGICAL HISTORY:  Surgery on 06/12/2020 for ovarian torsion.      SOCIAL HISTORY:   -Occasional alcohol use.   -No tobacco use.   -Occasional marijuana use.      FAMILY HISTORY:  No family history of malignancy.      PHYSICAL EXAMINATION:   GENERAL:  The patient is alert, oriented x 3.   VITAL SIGNS:  Reviewed.  Not in any distress.   The rest of the systems not examined.      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      RECOMMENDATIONS:   1.  I had a long discussion with the patient.  Reviewed the imaging studies, surgical finding and surgical pathology.  The patient has high-grade goblet cell adenocarcinoma of the appendix involving the intraabdominal organs.  She has stage IV disease.     2.  I had a long discussion with her regarding metastatic goblet cell carcinoma of the appendix.  I explained to her this is stage IV.  It is incurable.      Discussed with her regarding chemotherapy.  Chemotherapy with FOLFOX regimen has been used.  Also discussed regarding HIPEC.      I explained to the patient that this is a rare tumor.  I will plan on discussing her case at [facility].  I will also discuss it with some of my colleagues at [facility].  After that, we will form a treatment plan.     3.  The patient had a few questions, which were all answered.  We will see her in clinic in 2-3 weeks' time.      Thanks for the consult.      Total time spent was 45  minutes.         CANDY ROJAS MD             D: 2020   T: 2020   MT: SERGIO      Name:     SHANIQUE KUNZ   MRN:      [id number]        Account:       [id number]   :      1980           Consult Date:  2020      Document: [id number]       cc: Petty MACDONALD, CNP

## 2020-08-04 NOTE — PLAN OF CARE
[id number]:  Pt a/ox4. VS stable on room air. C/o moderate incisional pain with activity, ice applied to the area and norco given as needed. Incision is open to air, EDWIGE still in place. Pt still reports occasional nausea/reflux, TUMS given. New IV to left lower forearm. Passing gas, BM 8/4 am. Up with standby assist, ambulated in the halls 1x.

## 2020-08-04 NOTE — PROGRESS NOTES
Chart update.    Case discussed with Dr. Tal Brewster from [facility].  He agrees with systemic chemotherapy.  He also recommended that patient be evaluated at [facility] for HIPEC. Sequence of treatment will be decided after patient has been evaluated at [facility].  Patient agreeable to go to [facility].  That appointment will be scheduled after discharge.

## 2020-08-04 NOTE — PROGRESS NOTES
"[facility]  Hospitalist Progress Note          Assessment and Plan:      Metastatic appendiceal carcinoma:  POD 8 Diagnostic laparoscopy, supracervical hysterectomy, RSO, omentectomy, tumor debulking, right hemicoloectomy (Dr. Parkash Ernst) with ileotransverse enterocolostomy for  primary appendiceal carcinoma.  She is having stools and passing flatus.  Needs to move more.   Drain placed for large rim enhancing fluid collection in setting of fever, tachycardia and leukocytosis. On Zosyn x 4 days with normalization of WBC.  Anaerobic culture showing B. Fragilis.  Will change Zosyn to Augmentin.        Post operative anemia: likely dilutional component. S/p 1 unit PRBC's . Hgb. 7.0. and 1 unit yesterday.   No signs of bleeding. Hgb 8.5 today      DVT Prophylaxis: Resume lovenox per colorectal. Will continue for 3 1/2 wks post op. Script sent. Patient will need teaching.       Disposition:  May benefit from discharge planning.  Has 5 children (3 living with her).  She is recently  and living with her children at a friend's home. She plans to go her sisters upon discharge. Will have SW see patient. Anticipate discharge mid week if Hgb remains stable    Likely discharge tomorrow if continues to do well with no N/V.               Interval History:   Nausea after lost of OJ yesterday              Medications:   I have reviewed this patient's current medications               Physical Exam:   Blood pressure 132/43, pulse 85, temperature 98.7  F (37.1  C), temperature source Oral, resp. rate 16, height 1.6 m (5' 3\"), weight 68.1 kg (150 lb 3.2 oz), last menstrual period 2020, SpO2 98 %.        Vital Sign Ranges  Temperature Temp  Av.1  F (37.3  C)  Min: 98.1  F (36.7  C)  Max: 99.7  F (37.6  C)   Blood pressure Systolic (24hrs), Av , Min:119 , Max:146        Diastolic (24hrs), Av, Min:43, Max:89      Pulse Pulse  Av.3  Min: 81  Max: 87   Respirations Resp  Av  Min: " 15  Max: 17   Pulse oximetry SpO2  Av.3 %  Min: 98 %  Max: 100 %         Intake/Output Summary (Last 24 hours) at 2020 0842  Last data filed at 2020 0600  Gross per 24 hour   Intake 1766 ml   Output 200 ml   Net 1566 ml       Lungs:   Clear to auscultation     Cardiovascular:   normal apical pulses      Abdomen:   BS present, soft, non-distended.  Drain with no new outpatu     Musculoskeletal:   no lower extremity pitting edema present                Data:   All laboratory data reviewed

## 2020-08-04 NOTE — CONSULTS
Patient seen and examined. Full note dictated. Briefly, patient is a 40 year old woman with a several month history of intermittent vaginal bleeding. She then developed abdominal pain and imaging studies suggested torsion of the left and right ovaries. She had bilateral salpingoopherectomy with pathology demonstrating metastatic adenocarcinoma with goblet cell features. She was referred to Dr. Nicole and was admitted on 7/27 for surgery at which time she was found to have have high grade goblet cell carcinoma arising in the appendix with diffuse involvement of different abdominal structures. She underwent debulking surgery. Her post operative course has been uncomplicated to date. I reviewed different options with her and explained that I agree with the suggestions made by Dr. Gunderson. I also recommended sending her tumor for mutation analysis. For now, I recommend continued post operative recovery with follow up as an outpatient.

## 2020-08-04 NOTE — CONSULTS
Care Transition Initial Assessment - RN        Met with: Patient.  DATA   Principal Problem:    Ovarian cancer, bilateral (H)  Active Problems:    Pelvic mass in female       Cognitive Status: awake.        Contact information and PCP information verified: No  Lives With: other relative(s)                     Insurance concerns: No Insurance issues identified  ASSESSMENT  Patient currently receives the following services:  None prior to admission.        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   Patient had a pelvic drain placed on 8/1/2020 and per IR, pt will be discharging home with this drain and will need to flush the drain daily with Normal Saline.  Per chart review, pt recently  and living at friends with 3 or her 5 children.    Met with pt to discuss discharge plans concern and possible resources.  Per pt, she was  within the last year and in February they lost their house.  She stated that prior to admission, she and her ex-spouse and 3 of her 5 children had been living with a friend.  Per pt, she after discharging from the hospital, she will be going to stay with her sister and 3 of her children (her older, 13 yr old & 11 yr old).  Patient stated that her sister will be able to assist her with transporting to her medical appointments and provide help at home with cooking, cleaning and assistance with her care.  Patient also stated that her children are very helpful as well.  Discussed discharging home with pelvic drain that will need daily S flushing and dressing changes.  Patient stated that she had been shown how to flush the drain.  Offered Home Care RN at discharge  for on going support for drain and pt stated that she would discuss this with her sister.      PLAN  Financial costs for the patient include.  Discussed financial resources with patient, offered [facility] Emergency Financial Assistance for assistance with any bills and pt stated that she would be interested in this to help with paying her car payment.  Offered [employer] referral for additional resource and patient agreed.  Patient was provided with paperwork to complete.   Will follow-up with patient once paperwork completed to submit.  Patient given options and choices for discharge yes .  Patient/family is agreeable to the plan?  Yes: Patient will be discharging to her sister's home at discharge.   Patient anticipates discharging to  her sister's home at discharge.        Patient anticipates needs for home equipment: No  Transportation/person available to transport on day of discharge  is TBD.   Plan/Disposition:  her sister's home at discharge.    Appointments: See AVS.  Care  (CTS) will continue to follow as needed.  Luda Kemp RN

## 2020-08-04 NOTE — PROGRESS NOTES
Spiritual Health  88    SH visited Pt per length of stay. Pt has no SH needs at this time, but requested a rosary from SH.     SH provided rosary and explained SH availability.     SH will remain available as needed.     Zayra Pena  Chaplain Resident

## 2020-08-04 NOTE — PROGRESS NOTES
[facility]  PROGRESS NOTE    August 4, 2020  Post-op Day #8    SUBJECTIVE:  Slowly improving, had some nausea overnight but attributes it to drinking too much orange juice. Pain adequately controlled. Hgb stable after 1 U PRBC yesterday.     OBJECTIVE:  Temp:  [98.1  F (36.7  C)-99.7  F (37.6  C)] 98.7  F (37.1  C)  Pulse:  [81-87] 85  Heart Rate:  [85-94] 85  Resp:  [15-17] 16  BP: (119-146)/(43-89) 132/43  SpO2:  [98 %-100 %] 98 %    Intake/Output Summary (Last 24 hours) at 8/4/2020 0922  Last data filed at 8/4/2020 0600  Gross per 24 hour   Intake 1766 ml   Output 200 ml   Net 1566 ml       GENERAL:  Awake, alert, no acute distress, lying in bed  HEAD: Normocephalic atraumatic  SCLERA: Anicteric  EXTREMITIES: Warm and well perfused  ABDOMEN:  Soft, appropriately tender, non-distended. No guarding, rigidity, or peritoneal signs. EDWIGE drain with no output  INCISION:  C/d/i    LABS:  Lab Results   Component Value Date    WBC 9.7 08/04/2020     Lab Results   Component Value Date    HGB 8.5 08/04/2020     Lab Results   Component Value Date    HCT 24.9 08/04/2020     Lab Results   Component Value Date     08/04/2020     Last Basic Metabolic Panel:  Lab Results   Component Value Date     07/31/2020      Lab Results   Component Value Date    POTASSIUM 3.6 07/31/2020     Lab Results   Component Value Date    CHLORIDE 105 07/31/2020     Lab Results   Component Value Date    IRON 8.8 07/31/2020     Lab Results   Component Value Date    CO2 26 07/31/2020     Lab Results   Component Value Date    BUN 14 07/31/2020     Lab Results   Component Value Date    CR 0.68 08/03/2020     Lab Results   Component Value Date     07/31/2020       ASSESSMENT/PLAN: 40 year old female POD#8 s/p diagnostic laparoscopy, supracervical hysterectomy, RSO, omentectomy, tumor debulking (Dr Nicole) and right hemicoloectomy (Dr. 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 Now with pelvic fluid collection, s/p  IR  drainage 8/1. Plan to repeat CT today to assess fluid collection, possible removal of drain.       1. Low fiber diet  2. Prn pain meds, will add Toradol back in for option for pain control now that hgb stable and no further blood per rectum  3. Continue. Okay to transition to PO abx.  4. Lovenox for ppx  5. Likely discharge in next 1-2 days, will set up f/u with CRS.     Discussed with Dr. Ernst.   For questions/paging, please contact the CR[facility] office at [phone].    Megan Barahona PA-C  [facility]  Phone: [phone]    [facility]S Staff.  Seen and examined independently.  Agree with above.    I performed a history and physical examination of the patient and discussed their management with the physician assistant. I reviewed the physician assistants note and agree with the documented findings and plan of care.     Prakash Ernst MD  [facility]  [phone] (office)  [phone] (pager)  [email or web address]

## 2020-08-05 VITALS
SYSTOLIC BLOOD PRESSURE: 136 MMHG | HEART RATE: 85 BPM | WEIGHT: 159.39 LBS | RESPIRATION RATE: 16 BRPM | TEMPERATURE: 98.3 F | DIASTOLIC BLOOD PRESSURE: 82 MMHG | HEIGHT: 63 IN | OXYGEN SATURATION: 98 % | BODY MASS INDEX: 28.24 KG/M2

## 2020-08-05 LAB
BASOPHILS # BLD AUTO: 0 10E9/L (ref 0–0.2)
BASOPHILS NFR BLD AUTO: 0 %
DIFFERENTIAL METHOD BLD: ABNORMAL
EOSINOPHIL # BLD AUTO: 0.1 10E9/L (ref 0–0.7)
EOSINOPHIL NFR BLD AUTO: 1 %
ERYTHROCYTE [DISTWIDTH] IN BLOOD BY AUTOMATED COUNT: 14.6 % (ref 10–15)
HCT VFR BLD AUTO: 29.4 % (ref 35–47)
HGB BLD-MCNC: 9.7 G/DL (ref 11.7–15.7)
HYPOCHROMIA BLD QL: PRESENT
IMM PLASMA CELLS NFR BLD: 1 %
INTERPRETATION ECG - MUSE: NORMAL
LYMPHOCYTES # BLD AUTO: 2.1 10E9/L (ref 0.8–5.3)
LYMPHOCYTES NFR BLD AUTO: 22 %
MCH RBC QN AUTO: 27.4 PG (ref 26.5–33)
MCHC RBC AUTO-ENTMCNC: 33 G/DL (ref 31.5–36.5)
MCV RBC AUTO: 83 FL (ref 78–100)
METAMYELOCYTES # BLD: 0.3 10E9/L
METAMYELOCYTES NFR BLD MANUAL: 3 %
MONOCYTES # BLD AUTO: 0.6 10E9/L (ref 0–1.3)
MONOCYTES NFR BLD AUTO: 6 %
NEUTROPHILS # BLD AUTO: 6.5 10E9/L (ref 1.6–8.3)
NEUTROPHILS NFR BLD AUTO: 67 %
OVALOCYTES BLD QL SMEAR: SLIGHT
PLASMA CELLS # BLD MANUAL: 0.1 10E9/L
PLATELET # BLD AUTO: 434 10E9/L (ref 150–450)
PLATELET # BLD EST: ABNORMAL 10*3/UL
RBC # BLD AUTO: 3.54 10E12/L (ref 3.8–5.2)
WBC # BLD AUTO: 9.7 10E9/L (ref 4–11)

## 2020-08-05 PROCEDURE — 85025 COMPLETE CBC W/AUTO DIFF WBC: CPT | Performed by: OBSTETRICS & GYNECOLOGY

## 2020-08-05 PROCEDURE — 36415 COLL VENOUS BLD VENIPUNCTURE: CPT | Performed by: OBSTETRICS & GYNECOLOGY

## 2020-08-05 PROCEDURE — 25000128 H RX IP 250 OP 636: Performed by: COLON & RECTAL SURGERY

## 2020-08-05 PROCEDURE — 25000132 ZZH RX MED GY IP 250 OP 250 PS 637: Performed by: OBSTETRICS & GYNECOLOGY

## 2020-08-05 PROCEDURE — 25000132 ZZH RX MED GY IP 250 OP 250 PS 637: Performed by: NURSE PRACTITIONER

## 2020-08-05 RX ORDER — PROCHLORPERAZINE MALEATE 10 MG
10 TABLET ORAL EVERY 6 HOURS PRN
Qty: 20 TABLET | Refills: 1 | Status: SHIPPED | OUTPATIENT
Start: 2020-08-05 | End: 2021-04-15

## 2020-08-05 RX ORDER — IBUPROFEN 600 MG/1
600 TABLET, FILM COATED ORAL EVERY 6 HOURS PRN
Qty: 30 TABLET | Refills: 1 | Status: SHIPPED | OUTPATIENT
Start: 2020-08-05 | End: 2021-12-12

## 2020-08-05 RX ADMIN — HYDROCODONE BITARTRATE AND ACETAMINOPHEN 1 TABLET: 5; 325 TABLET ORAL at 13:40

## 2020-08-05 RX ADMIN — ENOXAPARIN SODIUM 40 MG: 40 INJECTION SUBCUTANEOUS at 09:48

## 2020-08-05 RX ADMIN — AMOXICILLIN AND CLAVULANATE POTASSIUM 1 TABLET: 875; 125 TABLET, FILM COATED ORAL at 09:48

## 2020-08-05 RX ADMIN — HYDROCODONE BITARTRATE AND ACETAMINOPHEN 1 TABLET: 5; 325 TABLET ORAL at 02:18

## 2020-08-05 ASSESSMENT — ACTIVITIES OF DAILY LIVING (ADL)
ADLS_ACUITY_SCORE: 13

## 2020-08-05 ASSESSMENT — MIFFLIN-ST. JEOR: SCORE: 1372.13

## 2020-08-05 NOTE — DISCHARGE INSTRUCTIONS
Same Day Surgery Discharge Instructions for  Sedation and General Anesthesia       It's not unusual to feel dizzy, light-headed or faint for up to 24 hours after surgery or while taking pain medication.  If you have these symptoms: sit for a few minutes before standing and have someone assist you when you get up to walk or use the bathroom.      You should rest and relax for the next 24 hours. We recommend you make arrangements to have an adult stay with you for at least 24 hours after your discharge.  Avoid hazardous and strenuous activity.      DO NOT DRIVE any vehicle or operate mechanical equipment for 24 hours following the end of your surgery.  Even though you may feel normal, your reactions may be affected by the medication you have received.      Do not drink alcoholic beverages for 24 hours following surgery.       Slowly progress to your regular diet as you feel able. It's not unusual to feel nauseated and/or vomit after receiving anesthesia.  If you develop these symptoms, drink clear liquids (apple juice, ginger ale, broth, 7-up, etc. ) until you feel better.  If your nausea and vomiting persists for 24 hours, please notify your surgeon.        All narcotic pain medications, along with inactivity and anesthesia, can cause constipation. Drinking plenty of liquids and increasing fiber intake will help.      For any questions of a medical nature, call your surgeon.      Do not make important decisions for 24 hours.      If you had general anesthesia, you may have a sore throat for a couple of days related to the breathing tube used during surgery.  You may use Cepacol lozenges to help with this discomfort.  If it worsens or if you develop a fever, contact your surgeon.       If you feel your pain is not well managed with the pain medications prescribed by your surgeon, please contact your surgeon's office to let them know so they can address your concerns.       CoVid 19 Information    We want to give you  information regarding Covid. Please consult your primary care provider with any questions you might have.     Patient who have symptoms (cough, fever, or shortness of breath), need to isolate for 7 days from when symptoms started OR 72 hours after fever resolves (without fever reducing medications) AND improvement of respiratory symptoms (whichever is longer).      Isolate yourself at home (in own room/own bathroom if possible)    Do Not allow any visitors    Do Not go to work or school    Do Not go to Mosque,  centers, shopping, or other public places.    Do Not shake hands.    Avoid close and intimate contact with others (hugging, kissing).    Follow CDC recommendations for household cleaning of frequently touched services.     After the initial 7 days, continue to isolate yourself from household members as much as possible. To continue decrease the risk of community spread and exposure, you and any members of your household should limit activities in public for 14 days after starting home isolation.     You can reference the following CDC link for helpful home isolation/care tips:  https://www.cdc.gov/coronavirus/2019-ncov/downloads/10Things.pdf    Protect Others:    Cover Your Mouth and Nose with a mask, disposable tissue or wash cloth to avoid spreading germs to others.    Wash your hands and face frequently with soap and water    Call Your Primary Doctor If: Breathing difficulty develops or you become worse.    For more information about COVID19 and options for caring for yourself at home, please visit the CDC website at https://www.cdc.gov/coronavirus/2019-ncov/about/steps-when-sick.html  For more options for care at [facility], please visit our website at [email or web address]      You were given the medication Sugammadex that may decrease effectiveness of birth control.  We recommend you use a backup method of birth control for 7 days after surgery.  Continue to  take your hormonal contraceptive during this period.        Today you were given 975 mg of Tylenol at 11:20 AM. The recommended daily maximum dose is 4000 mg.           Discharge Instructions for Laparoscopic or Davinci Hysterectomy    Incisional Care  A small amount of drainage from your incisions is normal. You may wear Band Aids until the drainage stops. The day after surgery, if your incisions are dry, remove the Band Aids. Leave the Steri-Strips (small pieces of tape) in place. Let them fall off on their own, or gently remove them after 7 days.  Once your have removed your Band Aids, you may shower as usual. Wash your incisions with mild soap and water. Pat dry.  Don't use oils, powders, or lotions on your incisions.  General Care  Take your medications exactly as directed by your doctor.    You may have vaginal bleeding that can last for several weeks. Use pads only. Don't put anything in your vagina (tampons, douches or have sexual intercourse) until your doctor says it's safe to do so.  Avoid constipation.  Eat fruits, vegetables, and whole grains.  Drink 6-8 glasses of water a day, unless told to do otherwise.  Use a laxative or a mild stool softener if your doctor says it's okay.  Activity  Don't drive while you are still taking narcotic pain medications.  Don t do strenuous activities until the doctor says it's okay.  Walk as often as you feel able.    Pain  Your incisions may be tender or sore. You may also have pain in your upper back or shoulders. This is from the gas used to enlarge your abdomen to allow your doctor to see inside your pelvis and perform the procedure. This pain usually goes away in a day or two.   When to Call Your Doctor  Call your doctor right away if you have any of the following:  Fever above 100.4 F (38 C) or chills  Vaginal bleeding that soaks more than one sanitary pad per hour  A foul smelling discharge from the vagina  Difficulty urinating  Severe pain   Redness, swelling, or  "drainage at your incision sites  Follow-Up  Make a follow-up appointment as directed by your surgeon.    **If you have questions or concerns about your procedure,   call Dr. Nicole [phone]**      Home Care Drain Care      1) You have a Stayfix tube stabilizer dressing which only needs to be changed once a week. If the drain will need to stay in longer than a week please call the numbers below for a dressing change.   -Please cover the dressing with plastic (saran wrap) prior to showering and change it the dressing gets wet.     2) Flush the drain with 5 mL Normal Saline (NS) once a day as instructed in the hospital using an alcohol wipe to clear off the flush port prior to attaching the saline syringe.    -You may aspirate (pull out) out what you flush in. If you aspirate what you flush in, keep the stopcock turned \"off\" to the EDWIGE bulb.   -Then turn the stopcock arm \"off\" towards you and flush the rest down into the EDWIGE bulb.  3) Empty, measure and record the outputs daily. Subtract the NS flush amount from the total    Please call Verenice IR RN clinician at  or Aleta IR NP at  for questions or concerns about the tube. You can leave a voicemail. We work Monday through Friday [phone] or [phone].     "

## 2020-08-05 NOTE — PLAN OF CARE
Pt. A/Ox4. VSS on RA. C/o abdominal pain, managed with tylenol. Denies nausea. Poor appetite, only a couple bites of supper. Midline incision with staples, RYAN/WDL.  R EDWIGE, minimal light pink output. Up Ind in room with walker. Continue with PO abx. Onc consults done today. Discharge pending, 1-2  days.

## 2020-08-05 NOTE — PLAN OF CARE
POD #9. Pt A/O x4, VSS c/o ABD pain that is tolerable. Has Tylenol and Norco available PRN. LS dim/clear, continues to demonstrate appropriate use of I.S. Pt up ambulating room independent;y and SBA in hallway. Appetite remains poor, but no nausea this shift. Encourage small snacks and meals , and anti emetic prior to eating as necessary. Midline incision with staples intact, steri strips intact to port sites. RLQ EDWIGE with minimal serosang output. Drain flushed w./ 5cc NS per order. Aleta reinforced education on drain flushing w/ NS 5cc every 8 hours. Pt acknowledges understanding of this. Pt demonstrates appreciate technique of lovenox injections with confidence. RN reviewed discharge instruction and new medications. Pt demonstrates understanding of these instructions. Pt also acknowledges understanding of follow up appts. IV was removed, EDWIGE dressing stay-fix changed. Pt dressed and discharged via w/c to home with sister. Pt brought all medications, phone and personal belongings with her.

## 2020-08-05 NOTE — DISCHARGE SUMMARY
HOSPITAL DISCHARGE SUMMARY    Patient Name: Jefe Blanc  YOB: 1980 Age: 40 year old  Medical Record Number: [id number]  Primary Physician: No Ref-Primary, Physician  Phone: None  Admission Date: 7/27/2020  Discharge Date: 8/6/20    Jefe Blanc  will be discharged from [facility] to Home.    PRINCIPAL DISCHARGE DIAGNOSIS:   FINAL DIAGNOSIS:   A: Soft tissue, subcutaneous nodule, excision:   - Positive for carcinoma.     B: Soft tissue, right pelvic peritoneum, excision:   - Positive for carcinoma.     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   - Fallopian tube outer surface positive for carcinoma.     D: Uterus, supracervical hysterectomy:   - Uterine serosa and outer myometrium involved by goblet cell   adenocarcinoma consistent with metastasis of   appendix origin (see part G).   - Lymphovascular invasion identified.   - Background adenomyosis and benign leiomyoma.   - Endometrium negative for atypia or hyperplasia.     E: Omentum, omentectomy:   - Positive for carcinoma.     F: Soft tissue, small bowel mesenteric nodule, excision:   - Positive for carcinoma.     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   - Small bowel/terminal ileum wall involved by carcinoma.   - Distal colon margin pericolonic tissue positive for carcinoma.   - Mesenteric margin positive for carcinoma.   - Perineural invasion identified.   - Lymphovascular invasion identified.   - 4 of 16 lymph nodes positive for carcinoma.   - Proximal margin negative for malignancy.   - Normal mismatch repair (MMR) protein expression by immunohistochemistry.     BRIEF HOSPITAL COURSE: This 40 year old female admitted following robotic assisted laparoscopic total hysterectomy and bilateral salpingo-oophorectomy with lymph node staging.  "She tolerated the procedure well.  Her post operative course was complicated with an infected fluid collection. A drain was placed and she was started on antibiotics and her symptoms resolved. She is going home on Augmentin to complete a 14 day course. She also developed post operative anemia with no signs of bleeding. She was transfused with PRBC's and her hemoglobin improved. She is discharged to home on POD #9 with adequate pain control, tolerating orals, voiding and ambulating.    PERFORMED DURING HOSPITALIZATION:   Diagnostic laparoscopy, exploratory laparotomy, supracervical hysterectomy, right salpingo-oophorectomy, omentectomy and tumor debulking    COMPLICATIONS IN HOSPITAL: None    CONSULTATIONS:  [place]-rectal    PERTINENT FINDINGS/RESULTS AT DISCHARGE:   /82 (BP Location: Right arm)   Pulse 85   Temp 98.3  F (36.8  C) (Oral)   Resp 16   Ht 1.6 m (5' 3\")   Wt 72.3 kg (159 lb 6.3 oz)   LMP 06/05/2020   SpO2 98%   BMI 28.24 kg/m      Latest Laboratory Results:  Chem:  CBC RESULTS:   Recent Labs   Lab Test 08/05/20  0709   WBC 9.7   RBC 3.54*   HGB 9.7*   HCT 29.4*   MCV 83   MCH 27.4   MCHC 33.0   RDW 14.6        Last Basic Metabolic Panel:  Lab Results   Component Value Date     07/31/2020      Lab Results   Component Value Date    POTASSIUM 3.6 07/31/2020     Lab Results   Component Value Date    CHLORIDE 105 07/31/2020     Lab Results   Component Value Date    IRON 8.8 07/31/2020     Lab Results   Component Value Date    CO2 26 07/31/2020     Lab Results   Component Value Date    BUN 14 07/31/2020     Lab Results   Component Value Date    CR 0.68 08/03/2020     Lab Results   Component Value Date     07/31/2020         IMPORTANT PENDING TEST RESULTS:  Pathology    CONDITION AT DISCHARGE:    Stabilized    DISCHARGE ORDERS  Current Discharge Medication List      START taking these medications    Details   amoxicillin-clavulanate (AUGMENTIN) 875-125 MG tablet Take 1 tablet by " mouth 2 times daily for 13 days  Qty: 26 tablet, Refills: 0    Associated Diagnoses: Pelvic mass in female      enoxaparin ANTICOAGULANT (LOVENOX ANTICOAGULANT) 40 MG/0.4ML syringe Inject 0.4 mLs (40 mg) Subcutaneous daily for 28 days  Qty: 28 Syringe, Refills: 0    Associated Diagnoses: Ovarian cancer, bilateral (H)      ibuprofen (ADVIL/MOTRIN) 600 MG tablet Take 1 tablet (600 mg) by mouth every 6 hours as needed  Qty: 30 tablet, Refills: 1    Associated Diagnoses: Ovarian cancer, bilateral (H)      prochlorperazine (COMPAZINE) 10 MG tablet Take 1 tablet (10 mg) by mouth every 6 hours as needed for nausea or vomiting  Qty: 20 tablet, Refills: 1    Associated Diagnoses: Ovarian cancer, bilateral (H)      senna (SENOKOT) 8.6 MG tablet Take 1-3 tablets by mouth 2 times daily as needed for constipation  Qty: 30 tablet, Refills: 0    Associated Diagnoses: Ovarian cancer, bilateral (H)      sodium chloride, PF, (NORMAL SALINE FLUSH) 0.9% PF flush 5 mLs by Intracatheter route every 24 hours for 10 days  Qty: 50 mL, Refills: 0    Associated Diagnoses: Ovarian cancer, bilateral (H)         CONTINUE these medications which have CHANGED    Details   HYDROcodone-acetaminophen (NORCO) 5-325 MG tablet Take 1 tablet by mouth every 6 hours as needed for pain  Qty: 10 tablet, Refills: 0    Associated Diagnoses: Post-operative state         CONTINUE these medications which have NOT CHANGED    Details   ketorolac (TORADOL) 10 MG tablet Take 1 tablet (10 mg) by mouth every 6 hours as needed for moderate pain  Qty: 20 tablet, Refills: 0    Associated Diagnoses: Post-operative state      levonorgestrel (MIRENA) 20 MCG/24HR IUD 1 each by Intrauterine route once             DISCHARGE INSTRUCTION.      FOLLOW-UP: Jefe Blanc should see No Ref-Primary, Physician PRN.    Specialty follow-up: as above.     AFTER HOSPITAL RECOMMENDATIONS  As above.      Physician(s) in addition to primary physician who should receive a copy:  No  Ref-Primary, Physician       Petty Escobedo, APRN CNP

## 2020-08-05 NOTE — PROGRESS NOTES
"[facility]  Hospitalist Progress Note          Assessment and Plan:      Metastatic appendiceal carcinoma:  POD 9 Diagnostic laparoscopy, supracervical hysterectomy, RSO, omentectomy, tumor debulking, right hemicoloectomy (Dr. Prakash Ernst) with ileotransverse enterocolostomy for  primary appendiceal carcinoma.  She is having stools and passing flatus.  Ambulating. Tolerating diet. Emesis x 1 yesterday r/t Augmentin.     Drain placed for large rim enhancing fluid collection in setting of fever, tachycardia and leukocytosis. On Zosyn x 4 days with normalization of WBC.  Anaerobic culture showing B. Fragilis.  Will change Zosyn to Augmentin.   Rx sent to complete 14 days. Discussed taking compazine/food if develops upset stomach. Will keep drain in for now. Reassess next week. NS flush's ordered per IR. Patient feels comfortable flushing the drain herself. Repeat CT a/p yesterday shows persistent fluid collection. Per IR note, Dr. Steele reviewed and feels the collection is much smaller.      Post operative anemia: improved.       DVT Prophylaxis: Will continue for 3 1/2 wks post op. Script sent. Teaching complete.       Disposition: Home today. Going to sisters. Excited to see her kids. Does not feel she needs SW at this time. We can set her up with out SW as an outpatient if needed. F/u with me in 1 week for staple removal. Discussed with patient oncology f/u. As this time she would like to f/u with Dr. [place] in 2-3 weeks. I will have my office arrange for this.     Petty Escobedo CNP  GYN ONC  Cell: [phone]             Interval History:   Nausea after Augmentin. Tolerating diet. No further nausea. Having BM's.               Medications:   I have reviewed this patient's current medications               Physical Exam:   Blood pressure 136/82, pulse 85, temperature 98.3  F (36.8  C), temperature source Oral, resp. rate 16, height 1.6 m (5' 3\"), weight 72.3 kg (159 lb 6.3 oz), last menstrual " period 2020, SpO2 98 %.        Vital Sign Ranges  Temperature Temp  Av.1  F (37.3  C)  Min: 98.1  F (36.7  C)  Max: 99.7  F (37.6  C)   Blood pressure Systolic (24hrs), Av , Min:119 , Max:146        Diastolic (24hrs), Av, Min:43, Max:89      Pulse Pulse  Av.3  Min: 81  Max: 87   Respirations Resp  Av  Min: 15  Max: 17   Pulse oximetry SpO2  Av.3 %  Min: 98 %  Max: 100 %         Intake/Output Summary (Last 24 hours) at 2020 0842  Last data filed at 2020 0600  Gross per 24 hour   Intake 1766 ml   Output 200 ml   Net 1566 ml       Lungs:   Clear to auscultation     Cardiovascular:   normal apical pulses      Abdomen:   BS present, soft, non-distended.  Drain with serosanginous drainage.      Musculoskeletal:   no lower extremity pitting edema present                Data:   All laboratory data reviewed

## 2020-08-05 NOTE — PLAN OF CARE
PODx9. A&Ox4. VSS on RA. C/o pain, given [place] prn x1 this shift. Ind w/ walker. Abdominal incision; WNL & RYAN. EDWIGE drain; patent. Low fiber diet; poor appetite. PIV SL. Discharge pending.

## 2020-08-05 NOTE — PROGRESS NOTES
Interventional Radiology Progress Note:  Inpatient at [facility]  Date: 2020   Patient name: Jefe Blanc  MRN:[id number]  :  1980    History: Jefe Blanc is a 40 year old woman who has a recent diagnosis of metastatic appendiceal carcinoma s/p POD 8 Diagnostic laparoscopy, supracervical hysterectomy, RSO, omentectomy, tumor debulking, right hemicoloectomy (Dr. Prakash Ernst) with ileotransverse enterocolostomy for  primary appendiceal carcinoma. She had leukocytosis and fevers, ct done which showed a pelvic fluid collection and a drain was placed 2020. ~ 250 mL serosanguinous fluid was drained the first day.      She is being seen in IR follow up.     Interval History: Feeling good. Eating better. No pain. Ready to go home.     Physical Exam:   Vitals: Temp:  [97.2  F (36.2  C)-98.3  F (36.8  C)] 98.3  F (36.8  C)  Heart Rate:  [83-90] 83  Resp:  [16] 16  BP: (136-140)/(82-93) 136/82  SpO2:  [98 %-100 %] 98 %    Labs, notes, imaging, IOs and VSs reviewed    ROUTINE LABS (Last four results)  CMP  Recent Labs   Lab 20  0723 20  1728 20  1502   NA  --  137 137   POTASSIUM  --  3.6 3.6   CHLORIDE  --  105 106   CO2  --  26 27   ANIONGAP  --  6 4   GLC  --  126* 142*   BUN  --  14 14   CR 0.68 0.67 0.61   GFRESTIMATED >90 >90 >90   GFRESTBLACK >90 >90 >90   IRON  --  8.8 9.1   PROTTOTAL  --   --  6.4*   ALBUMIN  --   --  2.6*   BILITOTAL  --   --  1.7*   ALKPHOS  --   --  61   AST  --   --  16   ALT  --   --  12     CBC  Recent Labs   Lab 20  0709 20  0651 20  0827 20  0723   WBC 9.7 9.7 8.7 12.2*   RBC 3.54* 3.03* 2.55* 2.60*   HGB 9.7* 8.5* 7.0* 7.2*   HCT 29.4* 24.9* 20.8* 21.0*   MCV 83 82 82 81   MCH 27.4 28.1 27.5 27.7   MCHC 33.0 34.1 33.7 34.3   RDW 14.6 14.3 14.6 14.5    338 320 282     INR  Recent Labs   Lab 20  0728   INR 1.34*     Arterial Blood GasNo lab results found in last 7 days.    General: Pleasant,  cooperative woman in no acute distress.  Neuro: A&O x 3. Moves all extremities equally.    Drain: Stayfix Dressing is C/D/I.   Tube flushes and aspirates back easily a total of 30 mL NS (not all at once) without pain or leaking.   Fluid is serosanguinous, able to aspirate back thicker purulent appearing fluid.   Outputs; ~ 480 mL so far since 8/1. 20 mL out so far this shift.     Assessment: Stable and ready for discharge.     Discharge instructions reviewed with Jefe including flushing and aspirating the drain, use of stopcock, emptying EDWIGE and recording outputs along  with a return demonstration. She appeared very comfortable with the drain cares.     Plan: Home care drain instructions are in the AVS with numbers to call if she has questions.     Carmen RN to change the stay fix dressing which will be good for a week without changing.     Per Jefe she will have a CT next week and the drain might be removed if fluid resolved.     Please send extra alcohol wipes, flushes and a clean measuring cup home with Jefe.     25 minutes were spent with patient during today's visit with greater than 50% of the time spent face to face with the patient, in reviewing medical record and images and in counseling and coordinating patient's care.    Thanks [facility] Interventional Radiology CNP ([phone]) (phone [phone])

## 2020-08-05 NOTE — PROGRESS NOTES
"    Progress Note     Primary Oncologist/Hematologist:  Dr. Mesa          Assessment and Plan:New actions/orders today (08/05/2020) are underlined.     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 Gunderson and Dr. Mesa  - She understands that this is a rare malignancy and that there is not a standard treatment approach  - [facility]  - Ferny feels comfortable with Dr. Mesa and would like to continue with her care being delivered at [facility].  - Tumor has been sent for mutational analysis  - FOLFOX has been proposed. We talked about some of the details of this treatment.  - Plan on follow up when she is back to see Dr. Corey MACDONALD, [facility]  [phone] (office), [phone] (cell)        Interval History:     Wanting to go home. We talked about follow up.              Review of Systems:     The 5 point Review of Systems is negative other than noted in the HPI             Medications:   Scheduled Medications    sodium chloride 0.9%  500 mL Intravenous Once     sodium chloride 0.9%  500 mL Intravenous Once     amoxicillin-clavulanate  1 tablet Oral Q12H CLARICE     enoxaparin ANTICOAGULANT  40 mg Subcutaneous Q24H     lidocaine  10 mL Subcutaneous Once     sodium chloride (PF)  3 mL Intracatheter Q8H     sodium chloride (PF)  5 mL Irrigation Q8H     PRN Medications  acetaminophen, calcium carbonate, calcium carbonate, HYDROcodone-acetaminophen, HYDROmorphone, ketorolac, lidocaine 4%, lidocaine (buffered or not buffered), naloxone, ondansetron **OR** ondansetron, prochlorperazine **OR** prochlorperazine, simethicone, sodium chloride (PF), sodium chloride (PF)               Physical Exam:   Vitals were reviewed  Blood pressure 136/82, pulse 85, temperature 98.3  F (36.8  C), temperature source Oral, resp. rate 16, height 1.6 m (5' 3\"), weight 72.3 kg (159 lb 6.3 " oz), last menstrual period 06/05/2020, SpO2 98 %.  Wt Readings from Last 4 Encounters:   08/05/20 72.3 kg (159 lb 6.3 oz)   06/12/20 68.9 kg (152 lb)       I/O last 3 completed shifts:  In: 120 [P.O.:120]  Out: 335 [Emesis/NG output:150; Drains:185]    Constitutional: Awake, alert, cooperative, no apparent distress            Data:   All laboratory data and imaging studies reviewed.    CMP  Recent Labs   Lab 08/03/20  0723 07/31/20  1728 07/31/20  1502   NA  --  137 137   POTASSIUM  --  3.6 3.6   CHLORIDE  --  105 106   CO2  --  26 27   ANIONGAP  --  6 4   GLC  --  126* 142*   BUN  --  14 14   CR 0.68 0.67 0.61   GFRESTIMATED >90 >90 >90   GFRESTBLACK >90 >90 >90   IRON  --  8.8 9.1   PROTTOTAL  --   --  6.4*   ALBUMIN  --   --  2.6*   BILITOTAL  --   --  1.7*   ALKPHOS  --   --  61   AST  --   --  16   ALT  --   --  12     CBC  Recent Labs   Lab 08/05/20  0709 08/04/20  0651 08/03/20  0827 08/02/20  0723   WBC 9.7 9.7 8.7 12.2*   RBC 3.54* 3.03* 2.55* 2.60*   HGB 9.7* 8.5* 7.0* 7.2*   HCT 29.4* 24.9* 20.8* 21.0*   MCV 83 82 82 81   MCH 27.4 28.1 27.5 27.7   MCHC 33.0 34.1 33.7 34.3   RDW 14.6 14.3 14.6 14.5    338 320 282     INR  Recent Labs   Lab 08/01/20  0728   INR 1.34*

## 2020-08-05 NOTE — PROGRESS NOTES
[facility]  PROGRESS NOTE    August 5, 2020  Post-op Day # 9    SUBJECTIVE:  Patient resting in bed. Had one emesis episode yesterday, believes due to too much OJ. Passing flatus and gas and pain controlled. Tolerating diet better this morning.     OBJECTIVE:  Temp:  [97.2  F (36.2  C)-98.3  F (36.8  C)] 98.3  F (36.8  C)  Heart Rate:  [83-90] 83  Resp:  [16] 16  BP: (136-140)/(82-93) 136/82  SpO2:  [98 %-100 %] 98 %    Intake/Output Summary (Last 24 hours) at 8/5/2020 0956  Last data filed at 8/5/2020 0500  Gross per 24 hour   Intake 120 ml   Output 215 ml   Net -95 ml       GENERAL:  Awake, alert, no acute distress  HEAD: Normocephalic atraumatic  SCLERA: Anicteric  EXTREMITIES: Warm and well perfused  ABDOMEN:  Soft, appropriately tender, non-distended. No guarding, rigidity, or peritoneal signs. Drain with serosanguinous kvrxmt=694pq/24hr.   INCISION:  C/d/i, staples intact    LABS:  Lab Results   Component Value Date    WBC 9.7 08/05/2020     Lab Results   Component Value Date    HGB 9.7 08/05/2020     Lab Results   Component Value Date    HCT 29.4 08/05/2020     Lab Results   Component Value Date     08/05/2020     Last Basic Metabolic Panel:  Lab Results   Component Value Date     07/31/2020      Lab Results   Component Value Date    POTASSIUM 3.6 07/31/2020     Lab Results   Component Value Date    CHLORIDE 105 07/31/2020     Lab Results   Component Value Date    IRON 8.8 07/31/2020     Lab Results   Component Value Date    CO2 26 07/31/2020     Lab Results   Component Value Date    BUN 14 07/31/2020     Lab Results   Component Value Date    CR 0.68 08/03/2020     Lab Results   Component Value Date     07/31/2020       ASSESSMENT/PLAN: 40 year old female POD#9 s/p diagnostic laparoscopy, supracervical hysterectomy, RSO, omentectomy, tumor debulking (Dr Nicole) and right hemicoloectomy (Dr. 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 Now with pelvic fluid  collection, s/p  IR drainage 8/1. Repeat CT 8/4 showed persistent fluid collection. WBC 9.7, Hgb stable at 9.7.      1. Low fiber diet  2. Prn pain meds, add Toradol   3. PO augmentin  4. Drain per IR  5. Lovenox for ppx  6. OK to discharge from CRS perspective. Can follow-up with our office PRN.   7. Discussed with Dr Ernst        For questions/paging, please contact the CRS office at [phone].    Maryse Zuniga PA-C  [facility]  Phone: [phone]    FOR CRS DATA USE:  Condition on discharge from OR: Satisfactory    Megan Barahona PA-C   [facility]   [phone].        ADDENDUM:    PATIENT DATA  Indicate Y or N:  Home O2 No  Hemodialysis  No  Transplant patient  No  Cirrhosis  No  Steroids in last 30 days  No  Immunomodulators in last 30 days  No  Anticoagulation at time of surgery  No   List medication n/a  Prior abdominal surgery  Yes  Pelvic irradiation  No    Albumin within 30 days if known   Lab Results   Component Value Date    ALBUMIN 2.6 07/31/2020        Hgb within 30 days if known    Hemoglobin   Date Value Ref Range Status   08/05/2020 9.7 (L) 11.7 - 15.7 g/dL Final   ]  Cr within 30 days if known    Creatinine   Date Value Ref Range Status   08/03/2020 0.68 0.52 - 1.04 mg/dL Final   ]  Body mass index is 28.24 kg/m .      OR DATA  Emergent  Yes   <24 hours  Yes   <1 week  No  Bowel Prep Yes  Antibiotics  Yes  DVT prophylaxis    Heparin  No   SCD  Yes   None  No  Drain  No  ASA (1,2,3,4) 2  OR time (min) 90  Stents  No  Transfuse >/= 2U  No  Anastomosis   Stapled  Yes   Handsewn  No  Leak Test    Positive  No   Negative  No   Not done  Yes    FOR CANCER ALSO COMPLETE:  Preoperative treatment (Y or N)   Chemo  No   Radiation No   Diversion  No  Preoperative Stage   CT  No   MRI No   US  Yes   Clinical  Yes   Unknown  No   T stage (1,2,3,4) 5   N stage (0,1,2) 3   M stage (0,1) 1    Metastatic disease at time of operation  Yes     Length of  stay: 9 days  Indicate Y or N for the following:  UTI  No  C diff  No  PNA  No  SSI No  DVT No  PE  No  CVA No  MI No  Enterocutaneous fistula  No  Peripheral nerve injury  No  Abscess (not adjacent to anastomosis) Yes  Leak No   Death within 30 days No  Reintubation  No  Reoperation  No   Procedure n/a    FOR CANCER CASES:  T stage: 4  N stage: 2   Total number of nodes: 16   Total positive: 4  M stage: 1  MSI (pos, neg): neg     Megan Barahona PA-C on 8/5/2020 at 3:13 PM

## 2020-08-06 LAB
BACTERIA SPEC CULT: NO GROWTH
BACTERIA SPEC CULT: NO GROWTH
SPECIMEN SOURCE: NORMAL
SPECIMEN SOURCE: NORMAL

## 2020-08-07 LAB
BACTERIA SPEC CULT: NO GROWTH
SPECIMEN SOURCE: NORMAL

## 2020-08-08 LAB
BACTERIA SPEC CULT: ABNORMAL
Lab: ABNORMAL
SPECIMEN SOURCE: ABNORMAL

## 2020-08-13 ENCOUNTER — HOSPITAL ENCOUNTER (OUTPATIENT)
Dept: CT IMAGING | Facility: CLINIC | Age: 40
Discharge: HOME | End: 2020-08-13
Attending: NURSE PRACTITIONER | Admitting: NURSE PRACTITIONER
Payer: MEDICAID

## 2020-08-13 ENCOUNTER — DOCUMENTATION ONLY (OUTPATIENT)
Dept: RADIOLOGY | Facility: CLINIC | Age: 40
End: 2020-08-13

## 2020-08-13 DIAGNOSIS — C18.1 CANCER OF APPENDIX (H): ICD-10-CM

## 2020-08-13 PROCEDURE — 74177 CT ABD & PELVIS W/CONTRAST: CPT

## 2020-08-13 PROCEDURE — 25000128 H RX IP 250 OP 636: Performed by: NURSE PRACTITIONER

## 2020-08-13 PROCEDURE — 25000125 ZZHC RX 250: Performed by: NURSE PRACTITIONER

## 2020-08-13 RX ORDER — IOPAMIDOL 755 MG/ML
71 INJECTION, SOLUTION INTRAVASCULAR ONCE
Status: COMPLETED | OUTPATIENT
Start: 2020-08-13 | End: 2020-08-13

## 2020-08-13 RX ADMIN — SODIUM CHLORIDE 70 ML: 9 INJECTION, SOLUTION INTRAVENOUS at 12:25

## 2020-08-13 RX ADMIN — IOPAMIDOL 71 ML: 755 INJECTION, SOLUTION INTRAVENOUS at 12:24

## 2020-08-13 NOTE — PROGRESS NOTES
Radiology Note:  Date: 2020   Patient name: Jefe Blanc  MRN:[id number]  :  1980    Patient was seen today for CT scan and possible drain removal at [facility]. After reviewing imaging with Dr Dubon, it appears that the fluid collection is smaller but not entirely resolved. The drain catheter still remains within the fluid. Consider repeat imaging when outputs are consistently less than 10mL for 2-3 consecutive days, but will ultimately leave up to Gyn/Onc team.    Patient reports that her outputs have been 10-15mL of thin bloody fluid per day and she is flushing once daily. There is still minimal debris in the tubing, so I advised her to continue flushing once daily and record outputs.I replaced her StayFix dressing as she felt that was more comfortable and secure than the gauze and tape. I contacted the office of ordering provider Petty Escobedo NP (she was in surgery), and requested that they reach out to Jefe in the next day or two with next steps. Patient verbalized understanding and agreement with the plan.    Jess Solitario PA-C  Interventional Radiology

## 2020-08-19 ENCOUNTER — TELEPHONE (OUTPATIENT)
Dept: OTHER | Facility: CLINIC | Age: 40
End: 2020-08-19

## 2020-08-19 NOTE — TELEPHONE ENCOUNTER
Spoke with patient.  Drain still intact.  Outputs are only around 5 ml, with flush subtraction.  She is seeing Petty Escobedo CNP today for evaluation.   Verenice Pierre RN  IR nurse clinician  [phone]

## 2020-08-26 ENCOUNTER — MEDICAL CORRESPONDENCE (OUTPATIENT)
Dept: HEALTH INFORMATION MANAGEMENT | Facility: CLINIC | Age: 40
End: 2020-08-26

## 2020-08-26 ENCOUNTER — TRANSFERRED RECORDS (OUTPATIENT)
Dept: HEALTH INFORMATION MANAGEMENT | Facility: CLINIC | Age: 40
End: 2020-08-26

## 2020-08-26 DIAGNOSIS — Z11.59 ENCOUNTER FOR SCREENING FOR OTHER VIRAL DISEASES: Primary | ICD-10-CM

## 2020-08-31 DIAGNOSIS — Z11.59 ENCOUNTER FOR SCREENING FOR OTHER VIRAL DISEASES: ICD-10-CM

## 2020-08-31 PROCEDURE — U0003 INFECTIOUS AGENT DETECTION BY NUCLEIC ACID (DNA OR RNA); SEVERE ACUTE RESPIRATORY SYNDROME CORONAVIRUS 2 (SARS-COV-2) (CORONAVIRUS DISEASE [COVID-19]), AMPLIFIED PROBE TECHNIQUE, MAKING USE OF HIGH THROUGHPUT TECHNOLOGIES AS DESCRIBED BY CMS-2020-01-R: HCPCS | Performed by: INTERNAL MEDICINE

## 2020-09-01 ENCOUNTER — TELEPHONE (OUTPATIENT)
Dept: INTERVENTIONAL RADIOLOGY/VASCULAR | Facility: CLINIC | Age: 40
End: 2020-09-01

## 2020-09-01 LAB
LABORATORY COMMENT REPORT: NORMAL
SARS-COV-2 RNA SPEC QL NAA+PROBE: NEGATIVE
SARS-COV-2 RNA SPEC QL NAA+PROBE: NORMAL
SPECIMEN SOURCE: NORMAL
SPECIMEN SOURCE: NORMAL

## 2020-09-01 NOTE — TELEPHONE ENCOUNTER
Pre-Procedure Negative COVID Test     Step 1 Patient Notification  Patient notified of the negative COVID test result    Step 2 Patient Information  Verified the patient remains symptom free   Patient informed to contact the ordering provider if any of the symptoms develop prior to the procedure    Fever/Chills   Cough   Shortness of breath   New loss of taste or smell  Sore throat  Muscle or body aches  Headaches  Fatigue  Vomiting or diarrhea    Step 3 Review Visitor Policy  Patient informed of the updated visitor policy     1 visitor allowed per patient    Visitor name:   Visitor must screen negative for COVID symptoms   Visitor must wear a mask  Waiting rooms continue to be closed to visitors

## 2020-09-02 ENCOUNTER — PATIENT OUTREACH (OUTPATIENT)
Dept: CARE COORDINATION | Facility: CLINIC | Age: 40
End: 2020-09-02

## 2020-09-02 ENCOUNTER — HOSPITAL ENCOUNTER (OUTPATIENT)
Facility: CLINIC | Age: 40
Discharge: HOME | End: 2020-09-02
Attending: RADIOLOGY | Admitting: RADIOLOGY
Payer: COMMERCIAL

## 2020-09-02 ENCOUNTER — APPOINTMENT (OUTPATIENT)
Dept: INTERVENTIONAL RADIOLOGY/VASCULAR | Facility: CLINIC | Age: 40
End: 2020-09-02
Attending: INTERNAL MEDICINE
Payer: COMMERCIAL

## 2020-09-02 VITALS
OXYGEN SATURATION: 99 % | HEART RATE: 60 BPM | TEMPERATURE: 98.3 F | RESPIRATION RATE: 16 BRPM | SYSTOLIC BLOOD PRESSURE: 122 MMHG | DIASTOLIC BLOOD PRESSURE: 84 MMHG

## 2020-09-02 DIAGNOSIS — C18.1 MALIGNANT NEOPLASM OF APPENDIX VERMIFORMIS (H): ICD-10-CM

## 2020-09-02 LAB
ERYTHROCYTE [DISTWIDTH] IN BLOOD BY AUTOMATED COUNT: 14.4 % (ref 10–15)
HCT VFR BLD AUTO: 36.1 % (ref 35–47)
HGB BLD-MCNC: 12 G/DL (ref 11.7–15.7)
INR PPP: 1.07 (ref 0.86–1.14)
LACTATE BLD-SCNC: 0.7 MMOL/L (ref 0.7–2)
MCH RBC QN AUTO: 27.7 PG (ref 26.5–33)
MCHC RBC AUTO-ENTMCNC: 33.2 G/DL (ref 31.5–36.5)
MCV RBC AUTO: 83 FL (ref 78–100)
PLATELET # BLD AUTO: 274 10E9/L (ref 150–450)
RBC # BLD AUTO: 4.33 10E12/L (ref 3.8–5.2)
WBC # BLD AUTO: 3.7 10E9/L (ref 4–11)

## 2020-09-02 PROCEDURE — 27210886 ZZH ACCESSORY CR5

## 2020-09-02 PROCEDURE — 25000128 H RX IP 250 OP 636: Performed by: NURSE PRACTITIONER

## 2020-09-02 PROCEDURE — 25000128 H RX IP 250 OP 636: Performed by: RADIOLOGY

## 2020-09-02 PROCEDURE — 27211193 ZZ H WOUND GLUE CR1

## 2020-09-02 PROCEDURE — 36415 COLL VENOUS BLD VENIPUNCTURE: CPT | Performed by: NURSE PRACTITIONER

## 2020-09-02 PROCEDURE — 85610 PROTHROMBIN TIME: CPT | Performed by: NURSE PRACTITIONER

## 2020-09-02 PROCEDURE — 85027 COMPLETE CBC AUTOMATED: CPT | Performed by: NURSE PRACTITIONER

## 2020-09-02 PROCEDURE — 25000125 ZZHC RX 250: Performed by: NURSE PRACTITIONER

## 2020-09-02 PROCEDURE — 36415 COLL VENOUS BLD VENIPUNCTURE: CPT | Performed by: RADIOLOGY

## 2020-09-02 PROCEDURE — 25000125 ZZHC RX 250: Performed by: RADIOLOGY

## 2020-09-02 PROCEDURE — 83605 ASSAY OF LACTIC ACID: CPT | Performed by: RADIOLOGY

## 2020-09-02 PROCEDURE — C1788 PORT, INDWELLING, IMP: HCPCS

## 2020-09-02 PROCEDURE — 76937 US GUIDE VASCULAR ACCESS: CPT

## 2020-09-02 PROCEDURE — 40000854 ZZH STATISTIC SIMPLE TUBE INSERTION/CHARGE, PORT, CATH, FISTULOGRAM

## 2020-09-02 RX ORDER — HEPARIN SODIUM,PORCINE 10 UNIT/ML
5 VIAL (ML) INTRAVENOUS EVERY 24 HOURS
Status: DISCONTINUED | OUTPATIENT
Start: 2020-09-02 | End: 2020-09-02 | Stop reason: HOSPADM

## 2020-09-02 RX ORDER — ACETAMINOPHEN 325 MG/1
650-975 TABLET ORAL
Status: DISCONTINUED | OUTPATIENT
Start: 2020-09-02 | End: 2020-09-02 | Stop reason: HOSPADM

## 2020-09-02 RX ORDER — CEFAZOLIN SODIUM 2 G/100ML
2 INJECTION, SOLUTION INTRAVENOUS
Status: COMPLETED | OUTPATIENT
Start: 2020-09-02 | End: 2020-09-02

## 2020-09-02 RX ORDER — FLUMAZENIL 0.1 MG/ML
0.2 INJECTION, SOLUTION INTRAVENOUS
Status: DISCONTINUED | OUTPATIENT
Start: 2020-09-02 | End: 2020-09-02 | Stop reason: HOSPADM

## 2020-09-02 RX ORDER — NALOXONE HYDROCHLORIDE 0.4 MG/ML
.1-.4 INJECTION, SOLUTION INTRAMUSCULAR; INTRAVENOUS; SUBCUTANEOUS
Status: DISCONTINUED | OUTPATIENT
Start: 2020-09-02 | End: 2020-09-02 | Stop reason: HOSPADM

## 2020-09-02 RX ORDER — HEPARIN SODIUM (PORCINE) LOCK FLUSH IV SOLN 100 UNIT/ML 100 UNIT/ML
500 SOLUTION INTRAVENOUS ONCE
Status: COMPLETED | OUTPATIENT
Start: 2020-09-02 | End: 2020-09-02

## 2020-09-02 RX ORDER — ONDANSETRON 2 MG/ML
4 INJECTION INTRAMUSCULAR; INTRAVENOUS ONCE
Status: COMPLETED | OUTPATIENT
Start: 2020-09-02 | End: 2020-09-02

## 2020-09-02 RX ORDER — FENTANYL CITRATE 50 UG/ML
25-50 INJECTION, SOLUTION INTRAMUSCULAR; INTRAVENOUS EVERY 5 MIN PRN
Status: DISCONTINUED | OUTPATIENT
Start: 2020-09-02 | End: 2020-09-02 | Stop reason: HOSPADM

## 2020-09-02 RX ORDER — HEPARIN SODIUM (PORCINE) LOCK FLUSH IV SOLN 100 UNIT/ML 100 UNIT/ML
5 SOLUTION INTRAVENOUS
Status: DISCONTINUED | OUTPATIENT
Start: 2020-09-02 | End: 2020-09-02 | Stop reason: HOSPADM

## 2020-09-02 RX ADMIN — LIDOCAINE HYDROCHLORIDE 9 ML: 10 INJECTION, SOLUTION INFILTRATION; PERINEURAL at 08:56

## 2020-09-02 RX ADMIN — MIDAZOLAM HYDROCHLORIDE 1 MG: 1 INJECTION, SOLUTION INTRAMUSCULAR; INTRAVENOUS at 08:27

## 2020-09-02 RX ADMIN — FENTANYL CITRATE 50 MCG: 50 INJECTION, SOLUTION INTRAMUSCULAR; INTRAVENOUS at 08:27

## 2020-09-02 RX ADMIN — FENTANYL CITRATE 50 MCG: 50 INJECTION, SOLUTION INTRAMUSCULAR; INTRAVENOUS at 08:43

## 2020-09-02 RX ADMIN — MIDAZOLAM HYDROCHLORIDE 1 MG: 1 INJECTION, SOLUTION INTRAMUSCULAR; INTRAVENOUS at 08:43

## 2020-09-02 RX ADMIN — ONDANSETRON 4 MG: 2 INJECTION INTRAMUSCULAR; INTRAVENOUS at 08:26

## 2020-09-02 RX ADMIN — CEFAZOLIN SODIUM 2 G: 2 INJECTION, SOLUTION INTRAVENOUS at 08:07

## 2020-09-02 RX ADMIN — HEPARIN SODIUM (PORCINE) LOCK FLUSH IV SOLN 100 UNIT/ML 500 UNITS: 100 SOLUTION at 08:57

## 2020-09-02 RX ADMIN — LIDOCAINE HYDROCHLORIDE 7 ML: 10; .005 INJECTION, SOLUTION EPIDURAL; INFILTRATION; INTRACAUDAL; PERINEURAL at 08:56

## 2020-09-02 NOTE — PROGRESS NOTES
PATIENT/VISITOR WELLNESS SCREENING    Step 1 Patient Screening    1. In the last month, have you been in contact with someone who was confirmed or suspected to have Coronavirus/COVID-19? No    2. Do you have the following symptoms?  Fever/Chills? No   Cough? No   Shortness of breath? No   New loss of taste or smell? No  Sore throat? No  Muscle or body aches? No  Headaches? No  Fatigue? No  Vomiting or diarrhea? No    Step 2 Visitor Screening    1. Name of Visitor (1 visitor per patient): Clau    2. In the last month, have you been in contact with someone who was confirmed or suspected to have Coronavirus/COVID-19? No    3. Do you have the following symptoms?  Fever/Chills? No   Cough? No   Shortness of breath? No   Skin rash? No   Loss of taste or smell? No  Sore throat? No  Runny or stuffy nose? No  Muscle or body aches? No  Headaches? No  Fatigue? No  Vomiting or diarrhea? No    If the visitor has positive symptoms, notify supervisor/manger  Per policy, the visitor will need to leave the facility     Step 3 Refer to logic grid below for actions    NO SYMPTOM(S)    ACTIONS:  1. Standard rooming process  2. Provider to assess per normal protocol  3. Implement precautions as needed and per guidelines     POSITIVE SYMPTOM(S)  If positive for ANY of the following symptoms: fever, cough, shortness of breath, rash    ACTION:  1. Continue to have the patient wear a mask   2. Room patient as soon as possible  3. Don appropriate PPE when entering room  4. Provider evaluation

## 2020-09-02 NOTE — PRE-PROCEDURE
GENERAL PRE-PROCEDURE:   Procedure:  Port placement with moderate sedation  Date/Time:  9/2/2020 7:45 AM    Written consent obtained?: Yes    Risks and benefits: Risks, benefits and alternatives were discussed    Consent given by:  Patient  Patient states understanding of procedure being performed: Yes    Patient's understanding of procedure matches consent: Yes    Procedure consent matches procedure scheduled: Yes    Expected level of sedation:  Moderate  Appropriately NPO:  Yes  ASA Class:  Class 1- healthy patient  Mallampati  :  Grade 1- soft palate, uvula, tonsillar pillars, and posterior pharyngeal wall visible  Lungs:  Lungs clear with good breath sounds bilaterally  Heart:  Normal heart sounds and rate  History & Physical reviewed:  History and physical reviewed and no updates needed  Statement of review:  I have reviewed the lab findings, diagnostic data, medications, and the plan for sedation

## 2020-09-02 NOTE — IR NOTE
Interventional Radiology Intra-procedural Nursing Note    Patient Name: Jefe Blanc  Medical Record Number: [id number]  Today's Date: September 2, 2020    Start Time: 08:37  End of procedure time: 09:04   Procedure: Right Side Port Placement  Report given to: Paola care suites RN  Time pt departs:  0915      Other Notes:     Pt came to IR suite 1 on a cart from [facility] with ancef infusing.  Pt was moved over to the table using an air mat in the supine position. Pt was draped and prepped with chlorhexidine to the right neck and chest wall.  Pt was given Zofran for nausea before receiving Fentanyl and versed for comfort. 6 FR slim port was placed via Right Jugular Vein.     Medication: Last Dose Time: 08:43    Fentanyl: 100 mcg  Versed: 2 mg  Zofran: 4 mg  Ancef: 2 gms  Lidocaine: 9 ml  Lido with EPI: 7 ml  Heparin Flush: 500 units      Imer Kumar RN

## 2020-09-02 NOTE — PROGRESS NOTES
[facility] Post Procedure Note    Patient Information  Name: Jefe Blanc  Age: 40 year old    Post Procedure  Time patient returned to [facility]: 0915  Concerns/abnormal assessment: Lactic Acid to be drawn for Sepsis alert.  If abnormal assessment, provider notified: not at this time.  Will alert MD if lactic acid abnormal  Plan/Other: cont to monitor.  Call light in reach.  Site WDL, clean intact.  No pain.  VSS  Pt has port information and packet to take home.    1002--Lactic Acid WDL 0.7 today.      Melia Choi RN

## 2020-09-02 NOTE — DISCHARGE INSTRUCTIONS
Port Insertion Discharge Instructions     After you go home:      Have an adult stay with you for the first 6 hours    You may resume your normal diet       For 24 hours - due to the sedation you received:    Relax and take it easy    Do NOT make any important or legal decisions    Do NOT drive or operate machines at home or at work    Do NOT drink alcohol    Care of Puncture Sites:      Keep the dressings on your sites clean & dry for 3 days. Change it only if it gets wet or dirty.    You may shower after the dressing comes off in 3 days    Do not remove the small white strips of tape, if present. Allow them to fall off on their own.     You may cover the wound with a bandaid after the dressing is removed if needed for comfort      Activity       Avoid heavy lifting (greater than 10 pounds) or the overuse of your shoulder for 3 days    Bleeding:      If you start bleeding from the incision sites in your chest or neck - or have swelling in your neck, sit down and press on the site for 5-10 minutes.     If bleeding has not stopped after 10 minutes, call your provider.        Call 911 right away if you have heavy bleeding or bleeding that does not stop.      Medicines:      You may resume all medications    Resume your Warfarin/Coumadin at your regular dose today. Follow up with your provider to have your INR rechecked    Resume your Platelet Inhibitors and Aspirin tomorrow at your regular dose    For minor pain, you may take Acetaminophen (Tylenol) or Ibuprofen (Advil)    Call the provider who ordered this procedure if:      You have swelling in your neck or over your port site    The incision area is red, swollen, hot or tender    You have chills or a fever greater than 101 F (38 C)    Any questions or concerns    Call  911 or go to the Emergency Room if you have:      Severe chest pain or trouble breathing    Bleeding that you cannot control    Additional Information:      Your port may be accessed right away.      You will need to have your port flushed every 30 days or after each use.      If you have questions call:          [facility] Radiology Dept @ [phone]      The provider who performed your procedure was _________________.

## 2020-09-02 NOTE — PROGRESS NOTES
[facility] Discharge Nursing Note    Patient Information  Name: Jefe Blanc  Age: 40 year old    Discharge Education:  Discharge instructions reviewed: Yes  Additional education/resources provided: pt has power port booklet and ID card  Patient/patient representative verbalizes understanding: Yes  Patient discharging on new medications: No  Medication education completed: N/A    Discharge Plans:   Discharge location: home  Discharge ride contacted: Yes  Approximate discharge time: 1020    Discharge Criteria:  Discharge criteria met and vital signs stable: Yes    Patient Belongs:  Patient belongings returned to patient: Yes    Melia Choi RN

## 2020-09-02 NOTE — PROGRESS NOTES
[facility] Admission Nursing Note    Patient Information  Name: Jefe Blanc  Age: 40 year old  Reason for admission: Port Placement  [facility] arrival time: 0715    Visitor Information  Name: Clau  Informed of visitor restrictions: Yes  1 visitor allowed per patient   Visitor must screen negative for COVID symptoms   Visitor must wear a mask  Waiting rooms closed to visitors    Patient Admission/Assessment   Pre-procedure assessment complete: Yes  If abnormal assessment/labs, provider notified: No- No HCG as pt has had a hysterectomy  NPO: Yes  Medications held per instructions/orders: N/A  Consent: deferred  If applicable, pregnancy test status: deferred-hysterectomy  Patient oriented to room: Yes  Education/questions answered: Yes  Plan/other: AVS reviewed with pt.    Discharge Planning  Discharge name/phone number: Clau   Overnight post sedation caregiver: n/a  Discharge location: home    Melia Choi RN

## 2020-11-06 ASSESSMENT — ENCOUNTER SYMPTOMS: FEVER: 1

## 2021-02-13 ENCOUNTER — HOSPITAL ENCOUNTER (EMERGENCY)
Facility: CLINIC | Age: 41
Discharge: HOME | End: 2021-02-14
Attending: EMERGENCY MEDICINE | Admitting: EMERGENCY MEDICINE
Payer: COMMERCIAL

## 2021-02-13 ENCOUNTER — APPOINTMENT (OUTPATIENT)
Dept: CT IMAGING | Facility: CLINIC | Age: 41
End: 2021-02-13
Attending: EMERGENCY MEDICINE
Payer: COMMERCIAL

## 2021-02-13 DIAGNOSIS — R11.0 NAUSEA: ICD-10-CM

## 2021-02-13 DIAGNOSIS — R10.84 ABDOMINAL PAIN, GENERALIZED: ICD-10-CM

## 2021-02-13 LAB
ALBUMIN SERPL-MCNC: 3 G/DL (ref 3.4–5)
ALP SERPL-CCNC: 70 U/L (ref 40–150)
ALT SERPL W P-5'-P-CCNC: 20 U/L (ref 0–50)
ANION GAP SERPL CALCULATED.3IONS-SCNC: 2 MMOL/L (ref 3–14)
AST SERPL W P-5'-P-CCNC: 13 U/L (ref 0–45)
BASOPHILS # BLD AUTO: 0 10E9/L (ref 0–0.2)
BASOPHILS NFR BLD AUTO: 0.2 %
BILIRUB SERPL-MCNC: 0.2 MG/DL (ref 0.2–1.3)
BUN SERPL-MCNC: 19 MG/DL (ref 7–30)
CALCIUM SERPL-MCNC: 8.4 MG/DL (ref 8.5–10.1)
CHLORIDE SERPL-SCNC: 109 MMOL/L (ref 94–109)
CO2 SERPL-SCNC: 31 MMOL/L (ref 20–32)
CREAT SERPL-MCNC: 0.76 MG/DL (ref 0.52–1.04)
DIFFERENTIAL METHOD BLD: ABNORMAL
EOSINOPHIL # BLD AUTO: 0.1 10E9/L (ref 0–0.7)
EOSINOPHIL NFR BLD AUTO: 0.9 %
ERYTHROCYTE [DISTWIDTH] IN BLOOD BY AUTOMATED COUNT: 15.9 % (ref 10–15)
GFR SERPL CREATININE-BSD FRML MDRD: >90 ML/MIN/{1.73_M2}
GLUCOSE SERPL-MCNC: 100 MG/DL (ref 70–99)
HCT VFR BLD AUTO: 34.6 % (ref 35–47)
HGB BLD-MCNC: 11.3 G/DL (ref 11.7–15.7)
IMM GRANULOCYTES # BLD: 0 10E9/L (ref 0–0.4)
IMM GRANULOCYTES NFR BLD: 0.2 %
INTERPRETATION ECG - MUSE: NORMAL
LIPASE SERPL-CCNC: 120 U/L (ref 73–393)
LYMPHOCYTES # BLD AUTO: 3.3 10E9/L (ref 0.8–5.3)
LYMPHOCYTES NFR BLD AUTO: 61.4 %
MCH RBC QN AUTO: 29 PG (ref 26.5–33)
MCHC RBC AUTO-ENTMCNC: 32.7 G/DL (ref 31.5–36.5)
MCV RBC AUTO: 89 FL (ref 78–100)
MONOCYTES # BLD AUTO: 0.1 10E9/L (ref 0–1.3)
MONOCYTES NFR BLD AUTO: 2.6 %
NEUTROPHILS # BLD AUTO: 1.9 10E9/L (ref 1.6–8.3)
NEUTROPHILS NFR BLD AUTO: 34.7 %
NRBC # BLD AUTO: 0 10*3/UL
NRBC BLD AUTO-RTO: 0 /100
PLATELET # BLD AUTO: 177 10E9/L (ref 150–450)
POTASSIUM SERPL-SCNC: 3.3 MMOL/L (ref 3.4–5.3)
PROT SERPL-MCNC: 6.1 G/DL (ref 6.8–8.8)
RBC # BLD AUTO: 3.89 10E12/L (ref 3.8–5.2)
SODIUM SERPL-SCNC: 142 MMOL/L (ref 133–144)
WBC # BLD AUTO: 5.4 10E9/L (ref 4–11)

## 2021-02-13 PROCEDURE — 96361 HYDRATE IV INFUSION ADD-ON: CPT

## 2021-02-13 PROCEDURE — 258N000003 HC RX IP 258 OP 636: Performed by: EMERGENCY MEDICINE

## 2021-02-13 PROCEDURE — 250N000011 HC RX IP 250 OP 636: Performed by: EMERGENCY MEDICINE

## 2021-02-13 PROCEDURE — 74177 CT ABD & PELVIS W/CONTRAST: CPT

## 2021-02-13 PROCEDURE — 99285 EMERGENCY DEPT VISIT HI MDM: CPT | Mod: 25

## 2021-02-13 PROCEDURE — 80053 COMPREHEN METABOLIC PANEL: CPT | Performed by: EMERGENCY MEDICINE

## 2021-02-13 PROCEDURE — 83690 ASSAY OF LIPASE: CPT | Performed by: EMERGENCY MEDICINE

## 2021-02-13 PROCEDURE — 85025 COMPLETE CBC W/AUTO DIFF WBC: CPT | Performed by: EMERGENCY MEDICINE

## 2021-02-13 PROCEDURE — 96374 THER/PROPH/DIAG INJ IV PUSH: CPT | Mod: 59

## 2021-02-13 PROCEDURE — 250N000009 HC RX 250: Performed by: EMERGENCY MEDICINE

## 2021-02-13 PROCEDURE — 93005 ELECTROCARDIOGRAM TRACING: CPT

## 2021-02-13 RX ORDER — SODIUM CHLORIDE, SODIUM LACTATE, POTASSIUM CHLORIDE, CALCIUM CHLORIDE 600; 310; 30; 20 MG/100ML; MG/100ML; MG/100ML; MG/100ML
INJECTION, SOLUTION INTRAVENOUS CONTINUOUS
Status: DISCONTINUED | OUTPATIENT
Start: 2021-02-13 | End: 2021-02-14 | Stop reason: HOSPADM

## 2021-02-13 RX ORDER — LORAZEPAM 2 MG/ML
1 INJECTION INTRAMUSCULAR ONCE
Status: COMPLETED | OUTPATIENT
Start: 2021-02-13 | End: 2021-02-13

## 2021-02-13 RX ORDER — IOPAMIDOL 755 MG/ML
71 INJECTION, SOLUTION INTRAVASCULAR ONCE
Status: COMPLETED | OUTPATIENT
Start: 2021-02-13 | End: 2021-02-13

## 2021-02-13 RX ORDER — LIDOCAINE/PRILOCAINE 2.5 %-2.5%
1 CREAM (GRAM) TOPICAL ONCE
Status: DISCONTINUED | OUTPATIENT
Start: 2021-02-13 | End: 2021-02-14 | Stop reason: HOSPADM

## 2021-02-13 RX ADMIN — IOPAMIDOL 71 ML: 755 INJECTION, SOLUTION INTRAVENOUS at 23:53

## 2021-02-13 RX ADMIN — SODIUM CHLORIDE, POTASSIUM CHLORIDE, SODIUM LACTATE AND CALCIUM CHLORIDE 1000 ML: 600; 310; 30; 20 INJECTION, SOLUTION INTRAVENOUS at 23:09

## 2021-02-13 RX ADMIN — LORAZEPAM 1 MG: 2 INJECTION INTRAMUSCULAR; INTRAVENOUS at 23:10

## 2021-02-13 RX ADMIN — SODIUM CHLORIDE 60 ML: 9 INJECTION, SOLUTION INTRAVENOUS at 23:53

## 2021-02-13 ASSESSMENT — ENCOUNTER SYMPTOMS
FEVER: 0
DIZZINESS: 1
SHORTNESS OF BREATH: 0
VOMITING: 0
WEAKNESS: 1
NAUSEA: 1
ABDOMINAL PAIN: 1
COUGH: 0

## 2021-02-13 ASSESSMENT — MIFFLIN-ST. JEOR: SCORE: 1277.36

## 2021-02-14 VITALS
HEIGHT: 62 IN | RESPIRATION RATE: 18 BRPM | TEMPERATURE: 97.8 F | DIASTOLIC BLOOD PRESSURE: 96 MMHG | BODY MASS INDEX: 26.13 KG/M2 | WEIGHT: 142 LBS | HEART RATE: 70 BPM | SYSTOLIC BLOOD PRESSURE: 125 MMHG | OXYGEN SATURATION: 97 %

## 2021-02-14 PROCEDURE — 250N000011 HC RX IP 250 OP 636: Performed by: EMERGENCY MEDICINE

## 2021-02-14 PROCEDURE — 258N000003 HC RX IP 258 OP 636: Performed by: EMERGENCY MEDICINE

## 2021-02-14 RX ORDER — HEPARIN SODIUM (PORCINE) LOCK FLUSH IV SOLN 100 UNIT/ML 100 UNIT/ML
100 SOLUTION INTRAVENOUS ONCE
Status: COMPLETED | OUTPATIENT
Start: 2021-02-14 | End: 2021-02-14

## 2021-02-14 RX ADMIN — SODIUM CHLORIDE 1000 ML: 9 INJECTION, SOLUTION INTRAVENOUS at 00:50

## 2021-02-14 RX ADMIN — HEPARIN SODIUM (PORCINE) LOCK FLUSH IV SOLN 100 UNIT/ML 100 UNITS: 100 SOLUTION at 02:57

## 2021-02-14 NOTE — DISCHARGE INSTRUCTIONS
*Get plenty of rest and avoid strenuous activities.  *Continue your current nausea medications.  *Follow-up with your doctor for a recheck within 12-36 hours.  *Return to the ER if you develop fever, worsening pain, pain that moves to the right lower abdomen, faint or feel like you will faint or become worse in any way.    Discharge Instructions  Abdominal Pain    Abdominal pain can be caused by many things. Your evaluation today does not show the exact cause for your pain. Your doctor today has decided that it is unlikely your pain is due to a life threatening problem, or a problem requiring surgery or hospital admission. Sometimes those problems cannot be found right away, so it is very important that you follow up as directed.  Sometimes only the changes which occur over time allow the cause of your pain to be found.    Return to the Emergency Department for a recheck in 8-12 hours if your pain continues.  If your pain gets worse, changes in location, or feels different, return to the Emergency Department right away.    ADULTS:  Return to the Emergency Department right away if:    You get an oral temperature above 102oF or as directed by your doctor.  You have blood in your stools (bright red or black, tarry stools).  You keep throwing up or can t drink liquids.  You see blood when you throw up.  You can t have a bowel movement or you can t pass gas.  Your stomach gets bloated or bigger.  Your skin or the whites of your eyes look yellow.  You faint.  You have bloody, frequent or painful urination.  You have new symptoms or anything that worries you.    CHILDREN:  Return to the Emergency Department right away if your child has any of the above-listed symptoms or the following:    Pushes your hand away or screams/cries when his/her belly is touched.  You notice your child is very fussy or weak.  Your child is very tired and is too tired to eat or drink.  Your child is dehydrated.  Signs of dehydration can be:  Your  infant has had no wet diapers in 4-5 hours.  Your older child has not passed urine in 6-8 hours.  Your infant or child starts to have dry mouth and lips, or no saliva or tears.    PREGNANT WOMEN:  Return to the Emergency Department right away if you have any of the above-listed symptoms or the following:    You have bleeding, leaking fluid or passing tissue from the vagina.  You have worse pain or cramping, or pain in your shoulder or back.  You have vomiting that will not stop.  You have painful or bloody urination.  You have a temperature of 100oF or more.  Your baby is not moving as much as usual.  You faint.  You get a bad headache with or without eye problems and abdominal pain.  You have a convulsion or seizure.  You have unusual discharge from your vagina and abdominal pain.    Abdominal pain is pretty common during pregnancy.  Your pain may or may not be related to your pregnancy. You should follow-up closely with your OB doctor so they can evaluate you and your baby.  Until you follow-up with your regular doctor, do the following:     Avoid sex and do not put anything in your vagina.  Drink clear fluids.  Only take medications approved by your doctor.    MORE INFORMATION:    Appendicitis:  A possible cause of abdominal pain in any person who still has their appendix is acute appendicitis. Appendicitis is often hard to diagnose.  Testing does not always rule out early appendicitis or other causes of abdominal pain. Close follow-up with your doctor and re-evaluations may be needed to figure out the reason for your abdominal pain.    Follow-up:  It is very important that you make an appointment with your clinic and go to the appointment.  If you do not follow-up with your primary doctor, it may result in missing an important development which could result in permanent injury or disability and/or lasting pain.  If there is any problem keeping your appointment, call your doctor or return to the Emergency  "Department.    Medications:  Take your medications as directed by your doctor today.  Before using over-the-counter medications, ask your doctor and make sure to take the medications as directed.  If you have any questions about medications, ask your doctor.    Diet:  Resume your normal diet as much as possible, but do not eat fried, fatty or spicy foods while you have pain.  Do not drink alcohol or have caffeine.  Do not smoke tobacco.    Probiotics: If you have been given an antibiotic, you may want to also take a probiotic pill or eat yogurt with live cultures. Probiotics have \"good bacteria\" to help your intestines stay healthy. Studies have shown that probiotics help prevent diarrhea and other intestine problems (including C. diff infection) when you take antibiotics. You can buy these without a prescription in the pharmacy section of the store.     If you were given a prescription for medicine here today, be sure to read all of the information (including the package insert) that comes with your prescription.  This will include important information about the medicine, its side effects, and any warnings that you need to know about.  The pharmacist who fills the prescription can provide more information and answer questions you may have about the medicine.  If you have questions or concerns that the pharmacist cannot address, please call or return to the Emergency Department.         Opioid Medication Information    Pain medications are among the most commonly prescribed medicines, so we are including this information for all our patients. If you did not receive pain medication or get a prescription for pain medicine, you can ignore it.     You may have been given a prescription for an opioid (narcotic) pain medicine and/or have received a pain medicine while here in the Emergency Department. These medicines can make you drowsy or impaired. You must not drive, operate dangerous equipment, or engage in any other " dangerous activities while taking these medications. If you drive while taking these medications, you could be arrested for DUI, or driving under the influence. Do not drink any alcohol while you are taking these medications.     Opioid pain medications can cause addiction. If you have a history of chemical dependency of any type, you are at a higher risk of becoming addicted to pain medications.  Only take these prescribed medications to treat your pain when all other options have been tried. Take it for as short a time and as few doses as possible. Store your pain pills in a secure place, as they are frequently stolen and provide a dangerous opportunity for children or visitors in your house to start abusing these powerful medications. We will not replace any lost or stolen medicine.  As soon as your pain is better, you should flush all your remaining medication.     Many prescription pain medications contain Tylenol  (acetaminophen), including Vicodin , Tylenol #3 , Norco , Lortab , and Percocet .  You should not take any extra pills of Tylenol  if you are using these prescription medications or you can get very sick.  Do not ever take more than 3000 mg of acetaminophen in any 24 hour period.    All opioids tend to cause constipation. Drink plenty of water and eat foods that have a lot of fiber, such as fruits, vegetables, prune juice, apple juice and high fiber cereal.  Take a laxative if you don t move your bowels at least every other day. Miralax , Milk of Magnesia, Colace , or Senna  can be used to keep you regular.      Remember that you can always come back to the Emergency Department if you are not able to see your regular doctor in the amount of time listed above, if you get any new symptoms, or if there is anything that worries you.

## 2021-02-14 NOTE — ED TRIAGE NOTES
Patient states she was removed from her chemo pump yesterday.  Complaining of dizziness, nausea, and generally feeling unwell

## 2021-02-14 NOTE — ED PROVIDER NOTES
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 The patient says that she started her first round of chemotherapy with FOLFOX on 2/10. She says that she received an initial dose of the medications at her clinic and was sent home on a chemo pump. The patient says that on 2/10 she started to have nausea, dizziness, weakness, and some abdominal pain. She notes that she has not had any vomiting, but has had a lot of gagging. She says that her chemo pump was removed yesterday on 2/12. She denies any fever, cough, shortness of breath, or leg swelling.  At home she has multiple different medications for nausea and vomiting.  She reports that Zofran is minimally effective.  Compazine is minimally effective.  She takes a medication that starts with a D but she cannot member the name of it.  She states that in the oncologist office she got Ativan and that was very helpful to her.    Review of Systems   Constitutional: Negative for fever.   Respiratory: Negative for cough and shortness of breath.    Cardiovascular: Negative for leg swelling.   Gastrointestinal: Positive for abdominal pain and nausea. Negative for vomiting.   Neurological: Positive for dizziness and weakness.   All other systems reviewed and are negative.    Allergies:  Azithromycin  Lactose    Medications:  Senokot  Compazine  Melatonin  Toradol  Ibuprofen  Norco   Mirena     Past Medical History:    Adnexal mass  Ovarian cancer bilateral  Ovarian cyst   Diverticulitis     Anemia     Past Surgical History:    Post partum tubal ligation  Laparoscopic cystectomy ovarian  Chest port placement  Hysterectomy supracervical, bilateral salpingo -oophorectomy, combined  ENT surgery- tubes  Colectomy with colostomy  Adenoidectomy      Family History:    Endometriosis  Fibroids  "  Hypertension     Social History:  Patient presents to the ED alone.     Physical Exam     Patient Vitals for the past 24 hrs:   BP Temp Temp src Pulse Resp SpO2 Height Weight   02/14/21 0213 -- -- -- -- 18 100 % -- --   02/14/21 0050 (!) 128/99 -- -- 64 18 100 % -- --   02/14/21 0007 (!) 138/106 -- -- 61 18 100 % -- --   02/13/21 2151 126/85 97.8  F (36.6  C) Temporal 63 18 100 % 1.575 m (5' 2\") 64.4 kg (142 lb)       Physical Exam  General: Well-nourished, appears nauseated  Eyes: PERRL, conjunctivae pink no scleral icterus or conjunctival injection  ENT:  Moist mucus membranes, posterior oropharynx clear without erythema or exudates  Respiratory:  Lungs clear to auscultation bilaterally, no crackles/rubs/wheezes.  Good air movement  CV: Normal rate and rhythm, no murmurs/rubs/gallops  GI:  Abdomen soft and non-distended.  Normoactive BS.  Mild mid abdominal tenderness, no guarding or rebound  Skin: Warm, dry.  No rashes or petechiae  Musculoskeletal: No peripheral edema or calf tenderness  Neuro: Alert and oriented to person/place/time  Psychiatric: Normal affect    Emergency Department Course     ECG  ECG taken at 2239, ECG read at 2242  Normal sinus rhythm with sinus arrhythmia  Normal ECG  Incomplete right  bundle branch block    Rate 60 bpm. MA interval 154 ms. QRS duration 106 ms. QT/QTc 446/446 ms. P-R-T axes 18 42 42.     Imaging:  CT Abdomen Pelvis w Contrast  1.  6 status post partial ascending colectomy. No evidence of obstruction.  2.  Small hiatal hernia.  Reading per radiology     Laboratory:    CBC: WBC 5.4, HGB 11.3 (L),   CMP: potassium 3.3 (L), anion gap 2 (L),  (H), calcium 8.4 (L), albumin 3.09 (L), protein total 6.1 (L) o/w WNL (Creatinine 0.76)  Lipase: 120    Procedures    Emergency Department Course:    Reviewed:  2208 I reviewed the patient's nursing notes, vitals, past medical records, Care Everywhere.        Assessments:  2226 I performed an exam of the patient as " documented above.   0041 Patient rechecked and updated.      Interventions:  2309 Lactated ringers 1 L IV   Ativan 1 mg IV   0050 NS 1 L IV   0257 Heparin 100 units intracatheter     Disposition:  The patient was discharged to home.       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  Laboratory studies were fairly reassuring.  She was given IV fluids and antiemetics.  CT was obtained given her history to rule out obstruction or another intra-abdominal catastrophe.  Fortunately, this was without acute findings.  She improved significantly.  She was sedated but eventually woke and called her sister who took her home.  She has antiemetics at home and I encouraged her to use these.  She is to call her oncologist office for further recommendations tomorrow or the next day.  She is asked to return should she develop a fever or any worsening.  With reasonable clinical confidence, I believe she is safe for discharge home at this time.    Diagnosis:    ICD-10-CM    1. Nausea  R11.0    2. Abdominal pain, generalized  R10.84        Discharge Medications:  New Prescriptions    No medications on file       Scribe Disclosure:  I, Eulogio Hernandez, am serving as a scribe at 10:19 PM on 2/13/2021 to document services personally performed by Alondra Briseno MD based on my observations and the provider's statements to me.          Alondra Briseno MD  02/14/21 2606

## 2021-02-14 NOTE — ED NOTES
Pt reports he nausea had resolved at this time, and she no longer had any abd pain. rn will continue to monitor.

## 2021-04-15 LAB
ALBUMIN SERPL-MCNC: 3.7 G/DL (ref 3.4–5)
ALP SERPL-CCNC: 104 U/L (ref 40–150)
ALT SERPL W P-5'-P-CCNC: 56 U/L (ref 0–50)
ANION GAP SERPL CALCULATED.3IONS-SCNC: 3 MMOL/L (ref 3–14)
AST SERPL W P-5'-P-CCNC: 39 U/L (ref 0–45)
BASOPHILS # BLD AUTO: 0 10E9/L (ref 0–0.2)
BASOPHILS NFR BLD AUTO: 0.4 %
BILIRUB SERPL-MCNC: 0.4 MG/DL (ref 0.2–1.3)
BUN SERPL-MCNC: 9 MG/DL (ref 7–30)
CALCIUM SERPL-MCNC: 9.9 MG/DL (ref 8.5–10.1)
CHLORIDE SERPL-SCNC: 106 MMOL/L (ref 94–109)
CO2 SERPL-SCNC: 29 MMOL/L (ref 20–32)
CREAT SERPL-MCNC: 0.74 MG/DL (ref 0.52–1.04)
DIFFERENTIAL METHOD BLD: NORMAL
EOSINOPHIL # BLD AUTO: 0 10E9/L (ref 0–0.7)
EOSINOPHIL NFR BLD AUTO: 0.8 %
ERYTHROCYTE [DISTWIDTH] IN BLOOD BY AUTOMATED COUNT: 13.1 % (ref 10–15)
GFR SERPL CREATININE-BSD FRML MDRD: >90 ML/MIN/{1.73_M2}
GLUCOSE SERPL-MCNC: 91 MG/DL (ref 70–99)
HCT VFR BLD AUTO: 40.4 % (ref 35–47)
HGB BLD-MCNC: 13.3 G/DL (ref 11.7–15.7)
IMM GRANULOCYTES # BLD: 0 10E9/L (ref 0–0.4)
IMM GRANULOCYTES NFR BLD: 0 %
LIPASE SERPL-CCNC: 81 U/L (ref 73–393)
LYMPHOCYTES # BLD AUTO: 2 10E9/L (ref 0.8–5.3)
LYMPHOCYTES NFR BLD AUTO: 37.7 %
MCH RBC QN AUTO: 28.3 PG (ref 26.5–33)
MCHC RBC AUTO-ENTMCNC: 32.9 G/DL (ref 31.5–36.5)
MCV RBC AUTO: 86 FL (ref 78–100)
MONOCYTES # BLD AUTO: 0.3 10E9/L (ref 0–1.3)
MONOCYTES NFR BLD AUTO: 5.4 %
NEUTROPHILS # BLD AUTO: 2.9 10E9/L (ref 1.6–8.3)
NEUTROPHILS NFR BLD AUTO: 55.7 %
NRBC # BLD AUTO: 0 10*3/UL
NRBC BLD AUTO-RTO: 0 /100
PLATELET # BLD AUTO: 286 10E9/L (ref 150–450)
POTASSIUM SERPL-SCNC: 3.8 MMOL/L (ref 3.4–5.3)
PROT SERPL-MCNC: 7.6 G/DL (ref 6.8–8.8)
RBC # BLD AUTO: 4.7 10E12/L (ref 3.8–5.2)
SODIUM SERPL-SCNC: 138 MMOL/L (ref 133–144)
WBC # BLD AUTO: 5.2 10E9/L (ref 4–11)

## 2021-04-15 PROCEDURE — C9803 HOPD COVID-19 SPEC COLLECT: HCPCS

## 2021-04-15 PROCEDURE — 85025 COMPLETE CBC W/AUTO DIFF WBC: CPT | Performed by: EMERGENCY MEDICINE

## 2021-04-15 PROCEDURE — 250N000011 HC RX IP 250 OP 636: Performed by: EMERGENCY MEDICINE

## 2021-04-15 PROCEDURE — 99285 EMERGENCY DEPT VISIT HI MDM: CPT | Mod: 25

## 2021-04-15 PROCEDURE — 80053 COMPREHEN METABOLIC PANEL: CPT | Performed by: EMERGENCY MEDICINE

## 2021-04-15 PROCEDURE — 96361 HYDRATE IV INFUSION ADD-ON: CPT

## 2021-04-15 PROCEDURE — 83690 ASSAY OF LIPASE: CPT | Performed by: EMERGENCY MEDICINE

## 2021-04-15 RX ORDER — LORAZEPAM 0.5 MG/1
TABLET ORAL
COMMUNITY
Start: 2020-09-13 | End: 2021-12-12

## 2021-04-15 RX ORDER — LISINOPRIL 5 MG/1
5 TABLET ORAL DAILY
COMMUNITY
Start: 2021-03-22 | End: 2021-12-12

## 2021-04-15 RX ORDER — IBUPROFEN 600 MG/1
1 TABLET, FILM COATED ORAL
COMMUNITY
Start: 2020-12-02 | End: 2021-12-12

## 2021-04-15 RX ORDER — ONDANSETRON 4 MG/1
4 TABLET, ORALLY DISINTEGRATING ORAL ONCE
Status: COMPLETED | OUTPATIENT
Start: 2021-04-15 | End: 2021-04-15

## 2021-04-15 RX ORDER — CITALOPRAM HYDROBROMIDE 20 MG/1
20 TABLET ORAL DAILY
COMMUNITY
Start: 2021-02-24 | End: 2021-12-12

## 2021-04-15 RX ADMIN — ONDANSETRON 4 MG: 4 TABLET, ORALLY DISINTEGRATING ORAL at 22:30

## 2021-04-16 ENCOUNTER — HOSPITAL ENCOUNTER (EMERGENCY)
Facility: CLINIC | Age: 41
Discharge: HOME | End: 2021-04-16
Attending: EMERGENCY MEDICINE | Admitting: EMERGENCY MEDICINE
Payer: COMMERCIAL

## 2021-04-16 ENCOUNTER — APPOINTMENT (OUTPATIENT)
Dept: CT IMAGING | Facility: CLINIC | Age: 41
End: 2021-04-16
Attending: EMERGENCY MEDICINE
Payer: COMMERCIAL

## 2021-04-16 VITALS
DIASTOLIC BLOOD PRESSURE: 94 MMHG | OXYGEN SATURATION: 100 % | HEART RATE: 62 BPM | RESPIRATION RATE: 16 BRPM | BODY MASS INDEX: 26.89 KG/M2 | WEIGHT: 147 LBS | TEMPERATURE: 97.5 F | SYSTOLIC BLOOD PRESSURE: 125 MMHG

## 2021-04-16 DIAGNOSIS — R51.9 ACUTE NONINTRACTABLE HEADACHE, UNSPECIFIED HEADACHE TYPE: ICD-10-CM

## 2021-04-16 DIAGNOSIS — R52 BODY ACHES: ICD-10-CM

## 2021-04-16 LAB
FLUAV RNA RESP QL NAA+PROBE: NEGATIVE
FLUBV RNA RESP QL NAA+PROBE: NEGATIVE
LABORATORY COMMENT REPORT: NORMAL
RSV RNA SPEC QL NAA+PROBE: NEGATIVE
SARS-COV-2 RNA RESP QL NAA+PROBE: NEGATIVE
SPECIMEN SOURCE: NORMAL

## 2021-04-16 PROCEDURE — 258N000003 HC RX IP 258 OP 636: Performed by: EMERGENCY MEDICINE

## 2021-04-16 PROCEDURE — 87636 SARSCOV2 & INF A&B AMP PRB: CPT | Performed by: EMERGENCY MEDICINE

## 2021-04-16 PROCEDURE — 250N000009 HC RX 250: Performed by: EMERGENCY MEDICINE

## 2021-04-16 PROCEDURE — 96375 TX/PRO/DX INJ NEW DRUG ADDON: CPT

## 2021-04-16 PROCEDURE — 96374 THER/PROPH/DIAG INJ IV PUSH: CPT

## 2021-04-16 PROCEDURE — 70450 CT HEAD/BRAIN W/O DYE: CPT

## 2021-04-16 PROCEDURE — 250N000011 HC RX IP 250 OP 636: Performed by: EMERGENCY MEDICINE

## 2021-04-16 RX ORDER — LIDOCAINE 40 MG/G
CREAM TOPICAL
Status: COMPLETED | OUTPATIENT
Start: 2021-04-16 | End: 2021-04-16

## 2021-04-16 RX ORDER — HEPARIN SODIUM (PORCINE) LOCK FLUSH IV SOLN 100 UNIT/ML 100 UNIT/ML
5 SOLUTION INTRAVENOUS
Status: DISCONTINUED | OUTPATIENT
Start: 2021-04-16 | End: 2021-04-16 | Stop reason: HOSPADM

## 2021-04-16 RX ORDER — METOCLOPRAMIDE HYDROCHLORIDE 5 MG/ML
10 INJECTION INTRAMUSCULAR; INTRAVENOUS ONCE
Status: COMPLETED | OUTPATIENT
Start: 2021-04-16 | End: 2021-04-16

## 2021-04-16 RX ORDER — METOCLOPRAMIDE 10 MG/1
10 TABLET ORAL 4 TIMES DAILY PRN
Qty: 10 TABLET | Refills: 0 | Status: SHIPPED | OUTPATIENT
Start: 2021-04-16 | End: 2021-12-12

## 2021-04-16 RX ORDER — DIPHENHYDRAMINE HYDROCHLORIDE 50 MG/ML
25 INJECTION INTRAMUSCULAR; INTRAVENOUS ONCE
Status: COMPLETED | OUTPATIENT
Start: 2021-04-16 | End: 2021-04-16

## 2021-04-16 RX ADMIN — LIDOCAINE: 40 CREAM TOPICAL at 02:25

## 2021-04-16 RX ADMIN — HEPARIN SODIUM (PORCINE) LOCK FLUSH IV SOLN 100 UNIT/ML 5 ML: 100 SOLUTION at 04:32

## 2021-04-16 RX ADMIN — DIPHENHYDRAMINE HYDROCHLORIDE 25 MG: 50 INJECTION, SOLUTION INTRAMUSCULAR; INTRAVENOUS at 03:19

## 2021-04-16 RX ADMIN — SODIUM CHLORIDE 1000 ML: 9 INJECTION, SOLUTION INTRAVENOUS at 03:19

## 2021-04-16 RX ADMIN — METOCLOPRAMIDE 10 MG: 5 INJECTION, SOLUTION INTRAMUSCULAR; INTRAVENOUS at 03:20

## 2021-04-16 ASSESSMENT — ENCOUNTER SYMPTOMS
HEADACHES: 1
DIZZINESS: 0
VOMITING: 0
FATIGUE: 1
FEVER: 0
WEAKNESS: 1
MYALGIAS: 1
SHORTNESS OF BREATH: 0
DIARRHEA: 0
NAUSEA: 1
CHILLS: 1
COUGH: 0

## 2021-04-16 NOTE — ED TRIAGE NOTES
Nausea, headache, neck pain since 2 pm today. Remission from cancer for 1.5 months.    Pt A&O x 3, CMS x 3, ABCD's adequate in triage

## 2021-04-16 NOTE — ED PROVIDER NOTES
History     Chief Complaint:  Myriad of Complaints     HPI   Jefe Blanc is a 41 year old female with history of ovarian cancer, diverticulitis, who presents for evaluation of myriad of complaints. The patient reports that she awoke with a headache and around 1400 started to develop associated nausea and neck pain. She endorses feeling fatigue and weak with body aches and chills. Then patient rates her headache as a 6/10 on severity and reports a history of mild headaches. The patient had ear pain the day prior. She denies cough, congestion, sneezing, loss taste/small, chest pain, shortness of breath, fever, vomiting, diarrhea, vision changes, or dizziness. She has not had any recent trauma, strain, or manipulations.     Review of Systems   Constitutional: Positive for chills and fatigue. Negative for fever.   HENT: Negative for congestion and sneezing.         No loss taste/smell   Eyes: Negative for visual disturbance.   Respiratory: Negative for cough and shortness of breath.    Cardiovascular: Negative for chest pain.   Gastrointestinal: Positive for nausea. Negative for diarrhea and vomiting.   Musculoskeletal: Positive for myalgias.   Neurological: Positive for weakness and headaches. Negative for dizziness.   All other systems reviewed and are negative.    Allergies:  Azithromycin  Lactose    Medications:  citalopram   lisinopril   Ativan     Past Medical History:    Anxiety   Diverticulitis   Ovarian cancer  Ovarian cyst     Past Surgical History:    Adenoidectomy   Colectomy without colostomy   ENT Tubes  Hysterectomy supracervical bilateral, Salpingo-oopherectomy  IR chest port placement   Cystectomy ovarian  Post partum tubal ligation    Family History:    Mother: endometriosis, fibroids  Sister: endometriosis     Social History:  The patient was unaccompanied to the ED.    Physical Exam     Patient Vitals for the past 24 hrs:   BP Temp Temp src Pulse Resp SpO2 Weight   04/16/21 0410 (!) 125/94 --  -- 62 -- 100 % --   04/16/21 0400 -- -- -- -- -- 98 % --   04/16/21 0330 -- -- -- -- -- 98 % --   04/16/21 0300 -- -- -- -- -- 100 % --   04/16/21 0200 -- -- -- -- -- 99 % --   04/16/21 0140 -- -- -- -- -- 100 % --   04/15/21 2213 (!) 140/108 97.5  F (36.4  C) Temporal 75 16 99 % 66.7 kg (147 lb)       Physical Exam    Head:               The scalp, face, and head appear normal and symmetric  Eyes:               Sclera white; PERRL; EOMI  ENT:                External ears and nares normal  Neck:               No bruit, full ROM neck  CV:                  Rate as above with regular rhythm   Resp:               Breath sounds clear and equal bilaterally  GI:                   Abdomen is soft, non-tender, non-distended  MS:                  Moves all extremities  Neuro:             Speech is normal and fluent. No apparent deficit                          Strength 5/5 x4.  Sensation intact x4.                           Cranial nerves II-XII intact by examination.    Emergency Department Course     Imaging:    Head CT w/o contrast  Final Result  IMPRESSION:  1.  No CT evidence of acute intracranial hemorrhage, recent infarct or mass lesion. Please note that contrast-enhanced MRI is a more sensitive way to detect intracranial metastases.  Reading per radiology     Laboratory:    Symptomatic Influenza A/B & SARS-CoV2 (COVID-19) Virus PCR Multiplex: Negative      CBC: WBC 5.2, HGB 13.3,   CMP: ALT 56 (H), o/w WNL (Creatinine 0.74)    Lipase: 81     Emergency Department Course:    Reviewed:    I reviewed the patient's nursing notes, vitals, past medical records, Care Everywhere.     Assessments:    0157 I performed an exam of the patient as documented above.     0420 Patient rechecked and updated.      Interventions:  2230 Zofran 4 mg PO  0225 LMX cream topical   0319 NS 1000 mL IV  0319 Benadryl 25 mg IV  0320 Reglan 10 mg IV    Disposition:  The patient was discharged to home.     Impression & Plan       Covid-19  Jefe Blanc was evaluated during a global COVID-19 pandemic, which necessitated consideration that the patient might be at risk for infection with the SARS-CoV-2 virus that causes COVID-19.   Applicable protocols for evaluation were followed during the patient's care.   COVID-19 was considered as part of the patient's evaluation. The plan for testing is:  a test was obtained during this visit.    Medical Decision Making:  Jefe Blanc is a 41 year old female who presents to the emergency department today for evaluation of acute onset headache with several associated symptoms that could suggest viral syndrome. COVID testing was obtained and is negative. Blood work was checked with no evidence of contributing sepsis, severe electrolyte abnormalities or renal insufficiency. She has a history of stage 4 cervical cancer and CT scan was obtained. There was no obvious evidence of mass lesions, swelling, or intracranial bleeding. She was feeling better after interventions here. She can follow up with her primary care physician. Return immediately if symptoms are worsening.     Diagnosis:    ICD-10-CM    1. Acute nonintractable headache, unspecified headache type  R51.9 Symptomatic Influenza A/B & SARS-CoV2 (COVID-19) Virus PCR Multiplex   2. Body aches  R52      Discharge Medications:  Discharge Medication List as of 4/16/2021  4:22 AM      START taking these medications    Details   metoclopramide (REGLAN) 10 MG tablet Take 1 tablet (10 mg) by mouth 4 times daily as needed (nausea, headache), Disp-10 tablet, R-0, E-Prescribe           Scribe Disclosure:  I, Orla Severson, am serving as a scribe at 1:56 AM on 4/16/2021 to document services personally performed by Nataliia Staley MD based on my observations and the provider's statements to me.     [facility] EMERGENCY DEPT         Nataliia Staley MD  04/16/21 1745

## 2021-12-12 ENCOUNTER — ANESTHESIA (OUTPATIENT)
Dept: SURGERY | Facility: CLINIC | Age: 41
End: 2021-12-12
Payer: COMMERCIAL

## 2021-12-12 ENCOUNTER — ANESTHESIA EVENT (OUTPATIENT)
Dept: SURGERY | Facility: CLINIC | Age: 41
End: 2021-12-12
Payer: COMMERCIAL

## 2021-12-12 ENCOUNTER — APPOINTMENT (OUTPATIENT)
Dept: CT IMAGING | Facility: CLINIC | Age: 41
End: 2021-12-12
Attending: EMERGENCY MEDICINE
Payer: COMMERCIAL

## 2021-12-12 ENCOUNTER — HOSPITAL ENCOUNTER (INPATIENT)
Facility: CLINIC | Age: 41
LOS: 8 days | Discharge: HOME | End: 2021-12-20
Attending: EMERGENCY MEDICINE | Admitting: SURGERY
Payer: COMMERCIAL

## 2021-12-12 DIAGNOSIS — K56.609 SMALL BOWEL OBSTRUCTION (H): Primary | ICD-10-CM

## 2021-12-12 DIAGNOSIS — K56.609 SMALL INTESTINE OBSTRUCTION (H): ICD-10-CM

## 2021-12-12 LAB
ABO/RH(D): NORMAL
ALBUMIN SERPL-MCNC: 3.4 G/DL (ref 3.4–5)
ALP SERPL-CCNC: 98 U/L (ref 40–150)
ALT SERPL W P-5'-P-CCNC: 26 U/L (ref 0–50)
ANION GAP SERPL CALCULATED.3IONS-SCNC: 5 MMOL/L (ref 3–14)
ANTIBODY SCREEN: NEGATIVE
AST SERPL W P-5'-P-CCNC: 24 U/L (ref 0–45)
BASOPHILS # BLD AUTO: 0 10E3/UL (ref 0–0.2)
BASOPHILS NFR BLD AUTO: 0 %
BILIRUB SERPL-MCNC: 0.5 MG/DL (ref 0.2–1.3)
BUN SERPL-MCNC: 9 MG/DL (ref 7–30)
CALCIUM SERPL-MCNC: 9.2 MG/DL (ref 8.5–10.1)
CHLORIDE BLD-SCNC: 109 MMOL/L (ref 94–109)
CO2 SERPL-SCNC: 29 MMOL/L (ref 20–32)
CREAT SERPL-MCNC: 0.78 MG/DL (ref 0.52–1.04)
EOSINOPHIL # BLD AUTO: 0 10E3/UL (ref 0–0.7)
EOSINOPHIL NFR BLD AUTO: 1 %
ERYTHROCYTE [DISTWIDTH] IN BLOOD BY AUTOMATED COUNT: 14 % (ref 10–15)
GFR SERPL CREATININE-BSD FRML MDRD: >90 ML/MIN/1.73M2
GLUCOSE BLD-MCNC: 91 MG/DL (ref 70–99)
HCT VFR BLD AUTO: 38.3 % (ref 35–47)
HGB BLD-MCNC: 12.4 G/DL (ref 11.7–15.7)
IMM GRANULOCYTES # BLD: 0 10E3/UL
IMM GRANULOCYTES NFR BLD: 0 %
LYMPHOCYTES # BLD AUTO: 2.2 10E3/UL (ref 0.8–5.3)
LYMPHOCYTES NFR BLD AUTO: 36 %
MCH RBC QN AUTO: 26.3 PG (ref 26.5–33)
MCHC RBC AUTO-ENTMCNC: 32.4 G/DL (ref 31.5–36.5)
MCV RBC AUTO: 81 FL (ref 78–100)
MONOCYTES # BLD AUTO: 0.4 10E3/UL (ref 0–1.3)
MONOCYTES NFR BLD AUTO: 6 %
NEUTROPHILS # BLD AUTO: 3.5 10E3/UL (ref 1.6–8.3)
NEUTROPHILS NFR BLD AUTO: 57 %
NRBC # BLD AUTO: 0 10E3/UL
NRBC BLD AUTO-RTO: 0 /100
PLATELET # BLD AUTO: 248 10E3/UL (ref 150–450)
POTASSIUM BLD-SCNC: 3.4 MMOL/L (ref 3.4–5.3)
PROT SERPL-MCNC: 7 G/DL (ref 6.8–8.8)
RBC # BLD AUTO: 4.72 10E6/UL (ref 3.8–5.2)
SARS-COV-2 RNA RESP QL NAA+PROBE: NEGATIVE
SODIUM SERPL-SCNC: 143 MMOL/L (ref 133–144)
SPECIMEN EXPIRATION DATE: NORMAL
WBC # BLD AUTO: 6.2 10E3/UL (ref 4–11)

## 2021-12-12 PROCEDURE — 96375 TX/PRO/DX INJ NEW DRUG ADDON: CPT

## 2021-12-12 PROCEDURE — 85014 HEMATOCRIT: CPT | Performed by: EMERGENCY MEDICINE

## 2021-12-12 PROCEDURE — 250N000011 HC RX IP 250 OP 636: Performed by: EMERGENCY MEDICINE

## 2021-12-12 PROCEDURE — 44005 FREEING OF BOWEL ADHESION: CPT | Mod: AS | Performed by: PHYSICIAN ASSISTANT

## 2021-12-12 PROCEDURE — C9803 HOPD COVID-19 SPEC COLLECT: HCPCS

## 2021-12-12 PROCEDURE — 36415 COLL VENOUS BLD VENIPUNCTURE: CPT | Performed by: EMERGENCY MEDICINE

## 2021-12-12 PROCEDURE — 96374 THER/PROPH/DIAG INJ IV PUSH: CPT

## 2021-12-12 PROCEDURE — 250N000009 HC RX 250: Performed by: EMERGENCY MEDICINE

## 2021-12-12 PROCEDURE — 43246 EGD PLACE GASTROSTOMY TUBE: CPT | Mod: 51 | Performed by: SURGERY

## 2021-12-12 PROCEDURE — 80053 COMPREHEN METABOLIC PANEL: CPT | Performed by: EMERGENCY MEDICINE

## 2021-12-12 PROCEDURE — 74176 CT ABD & PELVIS W/O CONTRAST: CPT

## 2021-12-12 PROCEDURE — 86850 RBC ANTIBODY SCREEN: CPT | Performed by: EMERGENCY MEDICINE

## 2021-12-12 PROCEDURE — 99285 EMERGENCY DEPT VISIT HI MDM: CPT | Mod: 25

## 2021-12-12 PROCEDURE — 87635 SARS-COV-2 COVID-19 AMP PRB: CPT | Performed by: EMERGENCY MEDICINE

## 2021-12-12 PROCEDURE — 99222 1ST HOSP IP/OBS MODERATE 55: CPT | Mod: 25 | Performed by: SURGERY

## 2021-12-12 PROCEDURE — 86901 BLOOD TYPING SEROLOGIC RH(D): CPT | Performed by: EMERGENCY MEDICINE

## 2021-12-12 PROCEDURE — 120N000001 HC R&B MED SURG/OB

## 2021-12-12 RX ORDER — CEFAZOLIN SODIUM 2 G/100ML
2 INJECTION, SOLUTION INTRAVENOUS SEE ADMIN INSTRUCTIONS
Status: DISCONTINUED | OUTPATIENT
Start: 2021-12-12 | End: 2021-12-13 | Stop reason: HOSPADM

## 2021-12-12 RX ORDER — SODIUM CHLORIDE, SODIUM LACTATE, POTASSIUM CHLORIDE, CALCIUM CHLORIDE 600; 310; 30; 20 MG/100ML; MG/100ML; MG/100ML; MG/100ML
INJECTION, SOLUTION INTRAVENOUS CONTINUOUS
Status: DISCONTINUED | OUTPATIENT
Start: 2021-12-12 | End: 2021-12-13 | Stop reason: HOSPADM

## 2021-12-12 RX ORDER — LIDOCAINE/PRILOCAINE 2.5 %-2.5%
1 CREAM (GRAM) TOPICAL ONCE
Status: COMPLETED | OUTPATIENT
Start: 2021-12-12 | End: 2021-12-12

## 2021-12-12 RX ORDER — CEFAZOLIN SODIUM 2 G/100ML
2 INJECTION, SOLUTION INTRAVENOUS
Status: COMPLETED | OUTPATIENT
Start: 2021-12-12 | End: 2021-12-13

## 2021-12-12 RX ORDER — ONDANSETRON 2 MG/ML
4 INJECTION INTRAMUSCULAR; INTRAVENOUS ONCE
Status: COMPLETED | OUTPATIENT
Start: 2021-12-12 | End: 2021-12-12

## 2021-12-12 RX ORDER — HYDROMORPHONE HYDROCHLORIDE 1 MG/ML
0.5 INJECTION, SOLUTION INTRAMUSCULAR; INTRAVENOUS; SUBCUTANEOUS ONCE
Status: COMPLETED | OUTPATIENT
Start: 2021-12-12 | End: 2021-12-12

## 2021-12-12 RX ADMIN — HYDROMORPHONE HYDROCHLORIDE 0.5 MG: 1 INJECTION, SOLUTION INTRAMUSCULAR; INTRAVENOUS; SUBCUTANEOUS at 21:30

## 2021-12-12 RX ADMIN — LIDOCAINE AND PRILOCAINE 1 G: 25; 25 CREAM TOPICAL at 18:39

## 2021-12-12 RX ADMIN — ONDANSETRON 4 MG: 2 INJECTION INTRAMUSCULAR; INTRAVENOUS at 21:31

## 2021-12-12 ASSESSMENT — ACTIVITIES OF DAILY LIVING (ADL): ADLS_ACUITY_SCORE: 4

## 2021-12-13 LAB
CREAT SERPL-MCNC: 0.76 MG/DL (ref 0.52–1.04)
GFR SERPL CREATININE-BSD FRML MDRD: >90 ML/MIN/1.73M2
GLUCOSE BLDC GLUCOMTR-MCNC: 103 MG/DL (ref 70–99)
GLUCOSE BLDC GLUCOMTR-MCNC: 135 MG/DL (ref 70–99)

## 2021-12-13 PROCEDURE — 88305 TISSUE EXAM BY PATHOLOGIST: CPT | Mod: TC | Performed by: SURGERY

## 2021-12-13 PROCEDURE — 250N000009 HC RX 250: Performed by: SURGERY

## 2021-12-13 PROCEDURE — 258N000003 HC RX IP 258 OP 636: Performed by: PHYSICIAN ASSISTANT

## 2021-12-13 PROCEDURE — 99207 PR CONSULT E&M CHANGED TO INITIAL LEVEL: CPT | Performed by: INTERNAL MEDICINE

## 2021-12-13 PROCEDURE — 82565 ASSAY OF CREATININE: CPT | Performed by: PHYSICIAN ASSISTANT

## 2021-12-13 PROCEDURE — 0DBW0ZX EXCISION OF PERITONEUM, OPEN APPROACH, DIAGNOSTIC: ICD-10-PCS | Performed by: SURGERY

## 2021-12-13 PROCEDURE — 250N000011 HC RX IP 250 OP 636: Performed by: ANESTHESIOLOGY

## 2021-12-13 PROCEDURE — 44005 FREEING OF BOWEL ADHESION: CPT | Performed by: SURGERY

## 2021-12-13 PROCEDURE — 250N000011 HC RX IP 250 OP 636: Performed by: PHYSICIAN ASSISTANT

## 2021-12-13 PROCEDURE — 999N000141 HC STATISTIC PRE-PROCEDURE NURSING ASSESSMENT: Performed by: SURGERY

## 2021-12-13 PROCEDURE — 250N000009 HC RX 250: Performed by: PHYSICIAN ASSISTANT

## 2021-12-13 PROCEDURE — 250N000011 HC RX IP 250 OP 636: Performed by: SURGERY

## 2021-12-13 PROCEDURE — 250N000009 HC RX 250: Performed by: ANESTHESIOLOGY

## 2021-12-13 PROCEDURE — 360N000077 HC SURGERY LEVEL 4, PER MIN: Performed by: SURGERY

## 2021-12-13 PROCEDURE — 93005 ELECTROCARDIOGRAM TRACING: CPT

## 2021-12-13 PROCEDURE — 0D9600Z DRAINAGE OF STOMACH WITH DRAINAGE DEVICE, OPEN APPROACH: ICD-10-PCS | Performed by: SURGERY

## 2021-12-13 PROCEDURE — 99207 PR NO CHARGE LOS: CPT | Performed by: INTERNAL MEDICINE

## 2021-12-13 PROCEDURE — 250N000025 HC SEVOFLURANE, PER MIN: Performed by: SURGERY

## 2021-12-13 PROCEDURE — 96375 TX/PRO/DX INJ NEW DRUG ADDON: CPT

## 2021-12-13 PROCEDURE — 258N000003 HC RX IP 258 OP 636: Performed by: ANESTHESIOLOGY

## 2021-12-13 PROCEDURE — P9041 ALBUMIN (HUMAN),5%, 50ML: HCPCS | Performed by: ANESTHESIOLOGY

## 2021-12-13 PROCEDURE — 710N000009 HC RECOVERY PHASE 1, LEVEL 1, PER MIN: Performed by: SURGERY

## 2021-12-13 PROCEDURE — 99221 1ST HOSP IP/OBS SF/LOW 40: CPT | Performed by: INTERNAL MEDICINE

## 2021-12-13 PROCEDURE — 120N000001 HC R&B MED SURG/OB

## 2021-12-13 PROCEDURE — 272N000001 HC OR GENERAL SUPPLY STERILE: Performed by: SURGERY

## 2021-12-13 PROCEDURE — 370N000017 HC ANESTHESIA TECHNICAL FEE, PER MIN: Performed by: SURGERY

## 2021-12-13 RX ORDER — NALOXONE HYDROCHLORIDE 0.4 MG/ML
0.4 INJECTION, SOLUTION INTRAMUSCULAR; INTRAVENOUS; SUBCUTANEOUS
Status: DISCONTINUED | OUTPATIENT
Start: 2021-12-13 | End: 2021-12-20 | Stop reason: HOSPADM

## 2021-12-13 RX ORDER — ONDANSETRON 2 MG/ML
4 INJECTION INTRAMUSCULAR; INTRAVENOUS EVERY 30 MIN PRN
Status: DISCONTINUED | OUTPATIENT
Start: 2021-12-13 | End: 2021-12-13 | Stop reason: HOSPADM

## 2021-12-13 RX ORDER — ONDANSETRON 4 MG/1
4 TABLET, ORALLY DISINTEGRATING ORAL EVERY 30 MIN PRN
Status: DISCONTINUED | OUTPATIENT
Start: 2021-12-13 | End: 2021-12-13 | Stop reason: HOSPADM

## 2021-12-13 RX ORDER — GLYCOPYRROLATE 0.2 MG/ML
INJECTION, SOLUTION INTRAMUSCULAR; INTRAVENOUS PRN
Status: DISCONTINUED | OUTPATIENT
Start: 2021-12-13 | End: 2021-12-13

## 2021-12-13 RX ORDER — PROPOFOL 10 MG/ML
INJECTION, EMULSION INTRAVENOUS PRN
Status: DISCONTINUED | OUTPATIENT
Start: 2021-12-13 | End: 2021-12-13

## 2021-12-13 RX ORDER — PROPOFOL 10 MG/ML
INJECTION, EMULSION INTRAVENOUS CONTINUOUS PRN
Status: DISCONTINUED | OUTPATIENT
Start: 2021-12-13 | End: 2021-12-13

## 2021-12-13 RX ORDER — HYDROMORPHONE HCL IN WATER/PF 6 MG/30 ML
0.4 PATIENT CONTROLLED ANALGESIA SYRINGE INTRAVENOUS EVERY 5 MIN PRN
Status: DISCONTINUED | OUTPATIENT
Start: 2021-12-13 | End: 2021-12-13 | Stop reason: HOSPADM

## 2021-12-13 RX ORDER — ONDANSETRON 2 MG/ML
4 INJECTION INTRAMUSCULAR; INTRAVENOUS EVERY 6 HOURS PRN
Status: DISCONTINUED | OUTPATIENT
Start: 2021-12-13 | End: 2021-12-20 | Stop reason: HOSPADM

## 2021-12-13 RX ORDER — NEOSTIGMINE METHYLSULFATE 1 MG/ML
VIAL (ML) INJECTION PRN
Status: DISCONTINUED | OUTPATIENT
Start: 2021-12-13 | End: 2021-12-13

## 2021-12-13 RX ORDER — ACETAMINOPHEN 325 MG/1
650 TABLET ORAL EVERY 4 HOURS PRN
Status: DISCONTINUED | OUTPATIENT
Start: 2021-12-16 | End: 2021-12-20 | Stop reason: HOSPADM

## 2021-12-13 RX ORDER — FENTANYL CITRATE 50 UG/ML
INJECTION, SOLUTION INTRAMUSCULAR; INTRAVENOUS PRN
Status: DISCONTINUED | OUTPATIENT
Start: 2021-12-13 | End: 2021-12-13

## 2021-12-13 RX ORDER — FENTANYL CITRATE 0.05 MG/ML
50 INJECTION, SOLUTION INTRAMUSCULAR; INTRAVENOUS EVERY 5 MIN PRN
Status: DISCONTINUED | OUTPATIENT
Start: 2021-12-13 | End: 2021-12-13 | Stop reason: HOSPADM

## 2021-12-13 RX ORDER — MAGNESIUM HYDROXIDE 1200 MG/15ML
LIQUID ORAL PRN
Status: DISCONTINUED | OUTPATIENT
Start: 2021-12-13 | End: 2021-12-13 | Stop reason: HOSPADM

## 2021-12-13 RX ORDER — LABETALOL HYDROCHLORIDE 5 MG/ML
10 INJECTION, SOLUTION INTRAVENOUS
Status: DISCONTINUED | OUTPATIENT
Start: 2021-12-13 | End: 2021-12-13 | Stop reason: HOSPADM

## 2021-12-13 RX ORDER — SODIUM CHLORIDE, SODIUM LACTATE, POTASSIUM CHLORIDE, CALCIUM CHLORIDE 600; 310; 30; 20 MG/100ML; MG/100ML; MG/100ML; MG/100ML
INJECTION, SOLUTION INTRAVENOUS CONTINUOUS PRN
Status: DISCONTINUED | OUTPATIENT
Start: 2021-12-13 | End: 2021-12-13

## 2021-12-13 RX ORDER — KETOROLAC TROMETHAMINE 15 MG/ML
15 INJECTION, SOLUTION INTRAMUSCULAR; INTRAVENOUS EVERY 6 HOURS PRN
Status: DISPENSED | OUTPATIENT
Start: 2021-12-13 | End: 2021-12-18

## 2021-12-13 RX ORDER — SODIUM CHLORIDE, SODIUM LACTATE, POTASSIUM CHLORIDE, CALCIUM CHLORIDE 600; 310; 30; 20 MG/100ML; MG/100ML; MG/100ML; MG/100ML
INJECTION, SOLUTION INTRAVENOUS CONTINUOUS
Status: DISCONTINUED | OUTPATIENT
Start: 2021-12-13 | End: 2021-12-13 | Stop reason: HOSPADM

## 2021-12-13 RX ORDER — METOPROLOL TARTRATE 1 MG/ML
5 INJECTION, SOLUTION INTRAVENOUS EVERY 6 HOURS PRN
Status: DISCONTINUED | OUTPATIENT
Start: 2021-12-13 | End: 2021-12-20 | Stop reason: HOSPADM

## 2021-12-13 RX ORDER — HYDRALAZINE HYDROCHLORIDE 20 MG/ML
2.5-5 INJECTION INTRAMUSCULAR; INTRAVENOUS EVERY 10 MIN PRN
Status: DISCONTINUED | OUTPATIENT
Start: 2021-12-13 | End: 2021-12-13 | Stop reason: HOSPADM

## 2021-12-13 RX ORDER — BISACODYL 10 MG
10 SUPPOSITORY, RECTAL RECTAL DAILY PRN
Status: DISCONTINUED | OUTPATIENT
Start: 2021-12-13 | End: 2021-12-20 | Stop reason: HOSPADM

## 2021-12-13 RX ORDER — NALOXONE HYDROCHLORIDE 0.4 MG/ML
INJECTION, SOLUTION INTRAMUSCULAR; INTRAVENOUS; SUBCUTANEOUS PRN
Status: DISCONTINUED | OUTPATIENT
Start: 2021-12-13 | End: 2021-12-13

## 2021-12-13 RX ORDER — OXYCODONE HYDROCHLORIDE 5 MG/1
5 TABLET ORAL EVERY 4 HOURS PRN
Status: DISCONTINUED | OUTPATIENT
Start: 2021-12-13 | End: 2021-12-20 | Stop reason: HOSPADM

## 2021-12-13 RX ORDER — HYDROMORPHONE HCL IN WATER/PF 6 MG/30 ML
0.2 PATIENT CONTROLLED ANALGESIA SYRINGE INTRAVENOUS
Status: DISCONTINUED | OUTPATIENT
Start: 2021-12-13 | End: 2021-12-20 | Stop reason: HOSPADM

## 2021-12-13 RX ORDER — HYDROMORPHONE HCL IN WATER/PF 6 MG/30 ML
0.4 PATIENT CONTROLLED ANALGESIA SYRINGE INTRAVENOUS
Status: DISCONTINUED | OUTPATIENT
Start: 2021-12-13 | End: 2021-12-20 | Stop reason: HOSPADM

## 2021-12-13 RX ORDER — ONDANSETRON 2 MG/ML
INJECTION INTRAMUSCULAR; INTRAVENOUS PRN
Status: DISCONTINUED | OUTPATIENT
Start: 2021-12-13 | End: 2021-12-13

## 2021-12-13 RX ORDER — DIPHENHYDRAMINE HYDROCHLORIDE 50 MG/ML
25 INJECTION INTRAMUSCULAR; INTRAVENOUS EVERY 6 HOURS PRN
Status: DISCONTINUED | OUTPATIENT
Start: 2021-12-13 | End: 2021-12-20 | Stop reason: HOSPADM

## 2021-12-13 RX ORDER — DEXAMETHASONE SODIUM PHOSPHATE 4 MG/ML
INJECTION, SOLUTION INTRA-ARTICULAR; INTRALESIONAL; INTRAMUSCULAR; INTRAVENOUS; SOFT TISSUE PRN
Status: DISCONTINUED | OUTPATIENT
Start: 2021-12-13 | End: 2021-12-13

## 2021-12-13 RX ORDER — MEPERIDINE HYDROCHLORIDE 25 MG/ML
12.5 INJECTION INTRAMUSCULAR; INTRAVENOUS; SUBCUTANEOUS EVERY 5 MIN PRN
Status: DISCONTINUED | OUTPATIENT
Start: 2021-12-13 | End: 2021-12-13 | Stop reason: HOSPADM

## 2021-12-13 RX ORDER — DIPHENHYDRAMINE HCL 25 MG
25 CAPSULE ORAL EVERY 6 HOURS PRN
Status: DISCONTINUED | OUTPATIENT
Start: 2021-12-13 | End: 2021-12-20 | Stop reason: HOSPADM

## 2021-12-13 RX ORDER — LIDOCAINE 40 MG/G
CREAM TOPICAL
Status: DISCONTINUED | OUTPATIENT
Start: 2021-12-13 | End: 2021-12-20 | Stop reason: HOSPADM

## 2021-12-13 RX ORDER — PROCHLORPERAZINE MALEATE 10 MG
10 TABLET ORAL EVERY 6 HOURS PRN
Status: DISCONTINUED | OUTPATIENT
Start: 2021-12-13 | End: 2021-12-20 | Stop reason: HOSPADM

## 2021-12-13 RX ORDER — ACETAMINOPHEN 325 MG/1
975 TABLET ORAL EVERY 8 HOURS
Status: DISPENSED | OUTPATIENT
Start: 2021-12-13 | End: 2021-12-16

## 2021-12-13 RX ORDER — SODIUM CHLORIDE, SODIUM LACTATE, POTASSIUM CHLORIDE, CALCIUM CHLORIDE 600; 310; 30; 20 MG/100ML; MG/100ML; MG/100ML; MG/100ML
INJECTION, SOLUTION INTRAVENOUS CONTINUOUS
Status: ACTIVE | OUTPATIENT
Start: 2021-12-13 | End: 2021-12-14

## 2021-12-13 RX ORDER — NALOXONE HYDROCHLORIDE 0.4 MG/ML
0.2 INJECTION, SOLUTION INTRAMUSCULAR; INTRAVENOUS; SUBCUTANEOUS
Status: DISCONTINUED | OUTPATIENT
Start: 2021-12-13 | End: 2021-12-20 | Stop reason: HOSPADM

## 2021-12-13 RX ORDER — ONDANSETRON 4 MG/1
4 TABLET, ORALLY DISINTEGRATING ORAL EVERY 6 HOURS PRN
Status: DISCONTINUED | OUTPATIENT
Start: 2021-12-13 | End: 2021-12-20 | Stop reason: HOSPADM

## 2021-12-13 RX ORDER — OXYCODONE HYDROCHLORIDE 5 MG/1
10 TABLET ORAL EVERY 4 HOURS PRN
Status: DISCONTINUED | OUTPATIENT
Start: 2021-12-13 | End: 2021-12-20 | Stop reason: HOSPADM

## 2021-12-13 RX ORDER — ALBUMIN, HUMAN INJ 5% 5 %
SOLUTION INTRAVENOUS CONTINUOUS PRN
Status: DISCONTINUED | OUTPATIENT
Start: 2021-12-13 | End: 2021-12-13

## 2021-12-13 RX ORDER — OXYCODONE HYDROCHLORIDE 5 MG/1
5 TABLET ORAL EVERY 4 HOURS PRN
Status: DISCONTINUED | OUTPATIENT
Start: 2021-12-13 | End: 2021-12-13 | Stop reason: HOSPADM

## 2021-12-13 RX ADMIN — HYDROMORPHONE HYDROCHLORIDE 0.2 MG: 0.2 INJECTION, SOLUTION INTRAMUSCULAR; INTRAVENOUS; SUBCUTANEOUS at 09:17

## 2021-12-13 RX ADMIN — GLYCOPYRROLATE 0.5 MG: 0.2 INJECTION, SOLUTION INTRAMUSCULAR; INTRAVENOUS at 02:02

## 2021-12-13 RX ADMIN — PHENYLEPHRINE HYDROCHLORIDE 100 MCG: 10 INJECTION INTRAVENOUS at 01:25

## 2021-12-13 RX ADMIN — FAMOTIDINE 20 MG: 10 INJECTION, SOLUTION INTRAVENOUS at 09:12

## 2021-12-13 RX ADMIN — ENOXAPARIN SODIUM 40 MG: 40 INJECTION SUBCUTANEOUS at 20:50

## 2021-12-13 RX ADMIN — MIDAZOLAM 2 MG: 1 INJECTION INTRAMUSCULAR; INTRAVENOUS at 00:54

## 2021-12-13 RX ADMIN — SODIUM CHLORIDE, POTASSIUM CHLORIDE, SODIUM LACTATE AND CALCIUM CHLORIDE: 600; 310; 30; 20 INJECTION, SOLUTION INTRAVENOUS at 06:40

## 2021-12-13 RX ADMIN — NEOSTIGMINE METHYLSULFATE 3.5 MG: 1 INJECTION, SOLUTION INTRAVENOUS at 02:02

## 2021-12-13 RX ADMIN — CEFAZOLIN SODIUM 2 G: 2 INJECTION, SOLUTION INTRAVENOUS at 00:51

## 2021-12-13 RX ADMIN — SODIUM CHLORIDE, POTASSIUM CHLORIDE, SODIUM LACTATE AND CALCIUM CHLORIDE: 600; 310; 30; 20 INJECTION, SOLUTION INTRAVENOUS at 15:12

## 2021-12-13 RX ADMIN — ONDANSETRON 4 MG: 4 TABLET, ORALLY DISINTEGRATING ORAL at 16:50

## 2021-12-13 RX ADMIN — KETOROLAC TROMETHAMINE 15 MG: 15 INJECTION, SOLUTION INTRAMUSCULAR; INTRAVENOUS at 19:00

## 2021-12-13 RX ADMIN — HYDROMORPHONE HYDROCHLORIDE 0.2 MG: 0.2 INJECTION, SOLUTION INTRAMUSCULAR; INTRAVENOUS; SUBCUTANEOUS at 04:53

## 2021-12-13 RX ADMIN — HYDROMORPHONE HYDROCHLORIDE 0.2 MG: 0.2 INJECTION, SOLUTION INTRAMUSCULAR; INTRAVENOUS; SUBCUTANEOUS at 15:15

## 2021-12-13 RX ADMIN — PROPOFOL 30 MCG/KG/MIN: 10 INJECTION, EMULSION INTRAVENOUS at 00:58

## 2021-12-13 RX ADMIN — HYDROMORPHONE HYDROCHLORIDE 0.2 MG: 0.2 INJECTION, SOLUTION INTRAMUSCULAR; INTRAVENOUS; SUBCUTANEOUS at 21:55

## 2021-12-13 RX ADMIN — PROPOFOL 160 MG: 10 INJECTION, EMULSION INTRAVENOUS at 00:58

## 2021-12-13 RX ADMIN — PHENYLEPHRINE HYDROCHLORIDE 100 MCG: 10 INJECTION INTRAVENOUS at 01:32

## 2021-12-13 RX ADMIN — ROCURONIUM BROMIDE 5 MG: 50 INJECTION, SOLUTION INTRAVENOUS at 00:58

## 2021-12-13 RX ADMIN — KETOROLAC TROMETHAMINE 15 MG: 15 INJECTION, SOLUTION INTRAMUSCULAR; INTRAVENOUS at 11:14

## 2021-12-13 RX ADMIN — ROCURONIUM BROMIDE 45 MG: 50 INJECTION, SOLUTION INTRAVENOUS at 01:07

## 2021-12-13 RX ADMIN — ONDANSETRON 4 MG: 2 INJECTION INTRAMUSCULAR; INTRAVENOUS at 01:56

## 2021-12-13 RX ADMIN — SODIUM CHLORIDE, POTASSIUM CHLORIDE, SODIUM LACTATE AND CALCIUM CHLORIDE: 600; 310; 30; 20 INJECTION, SOLUTION INTRAVENOUS at 00:53

## 2021-12-13 RX ADMIN — KETOROLAC TROMETHAMINE 15 MG: 15 INJECTION, SOLUTION INTRAMUSCULAR; INTRAVENOUS at 05:22

## 2021-12-13 RX ADMIN — SODIUM CHLORIDE, POTASSIUM CHLORIDE, SODIUM LACTATE AND CALCIUM CHLORIDE: 600; 310; 30; 20 INJECTION, SOLUTION INTRAVENOUS at 00:32

## 2021-12-13 RX ADMIN — FAMOTIDINE 20 MG: 10 INJECTION, SOLUTION INTRAVENOUS at 20:50

## 2021-12-13 RX ADMIN — SUCCINYLCHOLINE CHLORIDE 80 MG: 20 INJECTION, SOLUTION INTRAMUSCULAR; INTRAVENOUS; PARENTERAL at 00:58

## 2021-12-13 RX ADMIN — DEXAMETHASONE SODIUM PHOSPHATE 4 MG: 4 INJECTION, SOLUTION INTRA-ARTICULAR; INTRALESIONAL; INTRAMUSCULAR; INTRAVENOUS; SOFT TISSUE at 01:03

## 2021-12-13 RX ADMIN — FENTANYL CITRATE 100 MCG: 50 INJECTION, SOLUTION INTRAMUSCULAR; INTRAVENOUS at 01:15

## 2021-12-13 RX ADMIN — MEPERIDINE HYDROCHLORIDE 12.5 MG: 25 INJECTION INTRAMUSCULAR; INTRAVENOUS; SUBCUTANEOUS at 02:52

## 2021-12-13 RX ADMIN — HYDROMORPHONE HYDROCHLORIDE 0.4 MG: 0.2 INJECTION, SOLUTION INTRAMUSCULAR; INTRAVENOUS; SUBCUTANEOUS at 06:54

## 2021-12-13 RX ADMIN — ALBUMIN HUMAN: 0.05 INJECTION, SOLUTION INTRAVENOUS at 01:36

## 2021-12-13 RX ADMIN — FENTANYL CITRATE 100 MCG: 50 INJECTION, SOLUTION INTRAMUSCULAR; INTRAVENOUS at 00:58

## 2021-12-13 RX ADMIN — NALOXONE HYDROCHLORIDE 0.1 MG: 0.4 INJECTION, SOLUTION INTRAMUSCULAR; INTRAVENOUS; SUBCUTANEOUS at 02:38

## 2021-12-13 RX ADMIN — PHENYLEPHRINE HYDROCHLORIDE 100 MCG: 10 INJECTION INTRAVENOUS at 01:13

## 2021-12-13 RX ADMIN — Medication 20 MCG: at 01:02

## 2021-12-13 ASSESSMENT — ACTIVITIES OF DAILY LIVING (ADL)
ADLS_ACUITY_SCORE: 4
ADLS_ACUITY_SCORE: 10
ADLS_ACUITY_SCORE: 4
ADLS_ACUITY_SCORE: 6
ADLS_ACUITY_SCORE: 10
ADLS_ACUITY_SCORE: 4
ADLS_ACUITY_SCORE: 10
ADLS_ACUITY_SCORE: 6
ADLS_ACUITY_SCORE: 6
ADLS_ACUITY_SCORE: 4
ADLS_ACUITY_SCORE: 10
ADLS_ACUITY_SCORE: 4
ADLS_ACUITY_SCORE: 6
ADLS_ACUITY_SCORE: 8
ADLS_ACUITY_SCORE: 4
ADLS_ACUITY_SCORE: 8

## 2021-12-13 ASSESSMENT — MIFFLIN-ST. JEOR: SCORE: 1317.72

## 2021-12-13 NOTE — CONSULTS
[facility] Consultation    Jefe Blanc MRN# [id number]   YOB: 1980 Age: 41 year old   Date of Admission: 12/12/2021  Requesting physician: Dr. Ibrahim  Reason for consult: Goblet cell cancer appendix. Small bowel obstruction.            Assessment and Plan:     1. 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  Recent CT imaging was highly concerning for recurrence of her cancer.  She has since developed symptoms of obstruction and has undergone EXPLORATORY LAPAROTOMY, LYSIS OF ADHESIONS, PLACEMENT OF G TUBE, AND PERITONEAL BIOPSY on 12/12/21. Pathology is pending.     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    We are investigating whether an assay that can determine if  the patient has a homologous recombination deficiency as it could be be very helpful in determining whether PARP inhibition could be used at some point in the future.       Ferny previously has a prolonged response to FOLFOX following initial surgical resection. She did however develop a hypersensitivity reaction to the oxaliplatinum which required discontinuation of this medication. There  is a significant risk of this problem recurring if she were to receive the same medication again. Dr. Mesa is therefore recommending the  FOLFIRI regimen (irinotecan, bolus 5FU followed by infusional 5FU over 46 hours).  We reviewed the side effects typically seen with irinotecan including risk of low blood counts, diarrhea and hair loss,    Ferny was tentatively going to begin this regimen on Wednesday.  With her surgery, the timing of chemo will be re-discussed.    Pt will need nutritional support, likely with TPN.  Will discuss with Dr. Mesa tomorrow when he returns to clinic.    2.  GI bleeding.  Recent report of passing blood and mucous per rectum. Hgb is 12.4 compared to 12.7 in office on 12/8/21  - 12/10/21:  EGD by  Dr. Kerr.  No mass lesion or obvious bleeding.   - monitor hgb.     3. 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 Likely due to prior oxaliplatin Her symptoms have now largely resolved.    4.  Thyroid goiter. Recent CT scan shows the hyroid goiter appears unchanged.    Discussed with Dr. Bonita Heredia, TORRES, AOCNP  Nurse Practitioner  [facility]  [phone]             Chief Complaint:   Rectal Bleeding (Pt complains of abdominal pain, with recent dark blood in her stool and meghan blood in her emesis.  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 ED with c/o pelvic pain and vaginal discharge of 1 wks duration.  PUS:  Uterus 9 x 5.8 cm with two small fibroids.  Endometrial lining 8 mm.  IUD in good position.  Right ovary 4.6 x 5.5 x 3.8 cm with 2.1 x 1.5 x 1.6 cm complex cyst with debris within it in the right ovary.  Left ovary normal.    - 06/12/20 Presented again to [facility] ED with 1 day h/o LLQ cramping.  Found to have diffuse lower abdominal tenderness R > L.  PUS:  Uterus 9.6 x 5.9 x 5.6 cm with a few fibroids.  Endometrium 6 mm.  IUD present.  Right ovary 7.1 x 3.7 x 3.8 cm with homgeneous hypoechoic lesions measuring 1.6 x 1.3 cm . and larger, homogeneous, hyperechoic lesions measuring 3 x 3.7 x 4 cm with internal vascularity.  Left ovary measure 6.8 x 7.3 x 4.2 cm with homogeneous, hyperechoic lobulated mass measuring 6.8 x 5.9 x 3.6 cm (possible endometriomas, hemorrhagic cysts or mature teratomas).  Few uterine fibroids.  Myometrium heterogeneous and endometrium ill defined.    - 06/12/20 Taken to OR by Dr. Odilia Nieves and Dr. Arelis Montero.   Laparoscopic left ovarian detorsion, LSO, right ovarian cystectomy, removal of right solid ovarian mass, washings.  Cysts on right side ruptured. Pathology:  Left ovary with goblet cell adenocarcinoma, favor metastatic.  Right ovarian mass with goblet cell adenocarcinoma, favor metastatic.  Right ovarian cyst no evidence of malignancy.  Washing negative.    - 06/25/20 CEA = 1.2 ng/mL    = 57 U/mL    - 06/29/20 PET/CT: Left ovarian cystic lesion measures 5.1 cm with no significant associated hypermetabolic activity. 2.1 cm hypermetabolic focus within the endometrium near the fundus on the right could be physiologic, although endometrial neoplasm cannot entirely be excluded from this appearance. IUD appears to be in good position. Otherwise GILL.    -06/30/20 Endoscopy - normal.   Colonoscopy - normal     -07/27/20 Diagnostic laparoscopy, exploratory laparotomy, supracervical hysterectomy, right salpingooophorectomy, omentectomy and tumor debulking and Open right colectomy (Dr. Prakash Ernst).     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  Other sites of involvement included the omentum as well as right colon.  4 of 16 lymph nodes showed metastatic disease.  Lymphovascular and perineural invasion was identified.  Mismatch repair proteins were intact      -08/03/2020 Inpatient consult with Dr. Gunderson. Discussed chemotherapy with FOLFOX. Also discussed HIPEC    -08/04/2020 Second opinion consult with tumor sample for mutation analysis.  Mutation studies performed on the tumor showed the following results:  No mutation in KRAS, NRAS or BRAF.  Further, HER-2/keke was not amplified  MSI was stable and mismatch repair proteins were proficient.  Tumor mutation burden was  low, 1 mutation/MB   NTRK 1/2/3 fusions were not detected.  PIK3CA mutation not detected  PTEN mutation positive    Other findings include PDL 1 expression negative.  ARID1A mutation present (p.*)  BRIP1 mutation (p.A349P)    -08/13/2020 CT A/P: Pelvic fluid collection has decreased in size from 8/4/2020. The catheter was well-positioned within the remaining fluid. As a small amount of fluid remains, the catheter was not removed    -09/09/2020 cycle 1 of FOLFOX  -09/23/2020 cycle 2  -10/07/2020 cycle 3  -10/21/2020 cycle 4 Oxaliplatinum held due to prior hypersensitivity reaction    - CT scan on 10/30/2020 shows no evidence of residual or recurrent disease.  Postoperative changes were again noted.    - 11/04/2020 cycle 5 No change from cycle 4  - 11/18/2020 cycle 6  - 12/02/2020 cycle 7  - 12/16/2020 cycle 8  - 12/29/2020 cycle 9    - CT scan on 01/06/2021 shows no evidence of residual or recurrent disease.    - 01/12/2021 cycle 10  - 01/27/2021 cycle 11  - 02/10/2021 cycle 12    -CT of the chest, abdomen pelvis on 3/8/2021 shows no evidence of residual or recurrent disease.    -CT CAP 9/13/21 shows no obvious metastatic disease in chest, abdomen or pelvis.     - She was seen by Dr. Mesa on 12/8/21.  At that time, she reported   an abrupt episode of passing mucus and blood in bowel movements as well as recurring nausea and vomiting.  This happened on approximately 12/1/2021.  The symptoms persisted for approximately 10 days and have since resolved.  She relates no fever or chills.  She also reports a single episode of epistaxis requiring nasal cauterization.  She contacted the office regarding the symptoms and had a follow-up CT scan to help assess disease status.  CT dated 12/3/21 showed several areas of nodularity identified adjacent to the stomach and small bowel loops worrisome for areas of  potential carcinomatosis. New nodularity at the mesentery as well. There is an indeterminate newly identified  "nodular lesion at the right hemipelvis that could represent carcinomatosis versus ill-defined right ovary if present. Stable but nonspecific wall thickening and enhancement of the rectosigmoid colon. Correlate with any signs of colitis or proctitis. A neoplastic process in tliis region is difficult to exclude.  Wall thickening of the gallbladder is noted. Correlate with any gallbladder symptoms.    - 12/10/21 was see by Dr. Kerr.  He performed an EGD on Ms. Blanc and  there was no mass lesion or evidence of gastric outlet obstruction at the time of the procedure. He also related that the stomach was easily distended. He also noted some mild esophagitis and related that he took several biopsies. We then discussed whether she should return for an EUS. Given the above findings and resolution of her symptoms, we both agreed that an EUS would be unlikely to change the management options.    - Dr. Mesa spoke to Bev over the phone this weekend and outlined a plan to begin FOLFIRI this week.     -She subsequently developed escalating abdominal pain and presented to ER.  CT suggested closed-loop small bowel obstruction         Physical Exam:   Vitals were reviewed  Blood pressure (!) 136/95, pulse 64, temperature 98.2  F (36.8  C), temperature source Oral, resp. rate 14, height 1.575 m (5' 2\"), weight 68.9 kg (152 lb), SpO2 94 %.  Temperatures:  Current - Temp: 98.2  F (36.8  C); Max - Temp  Av.4  F (36.9  C)  Min: 97.9  F (36.6  C)  Max: 99.1  F (37.3  C)  Respiration range: Resp  Av.3  Min: 8  Max: 18  Pulse range: Pulse  Av.2  Min: 61  Max: 87  Blood pressure range: Systolic (24hrs), Av , Min:122 , Max:156   ; Diastolic (24hrs), Av, Min:82, Max:106    Pulse oximetry range: SpO2  Av.9 %  Min: 94 %  Max: 100 %    Intake/Output Summary (Last 24 hours) at 2021 1442  Last data filed at 2021 1200  Gross per 24 hour   Intake 1513 ml   Output 405 ml   Net 1108 ml       GENERAL: No " acute distress.  SKIN: No rashes or jaundice.  HEENT: Normocephalic, atraumatic. Eyes anicteric. Oropharynx is clear.  LYMPH: No palpable lymphadenopathy in the cervical, supraclavicular, axillary, or inguinal regions.  HEART: Regular rate and rhythm with no murmurs.  LUNGS: Clear bilaterally.  ABDOMEN: Post op tenderness. Venting G tube in place  EXTREMITIES: No clubbing, cyanosis, or edema.  MENTAL: Alert and oriented to person, place, and time.  NEURO: Cranial nerves II through XII grossly intact with no focal motor or sensory deficits.              Past Medical History:   I have reviewed this patient's past medical history  Past Medical History:   Diagnosis Date     Anxiety      Diverticulitis     of esophagus     Ovarian cancer (H)      Ovarian cyst              Past Surgical History:   I have reviewed this patient's past surgical history  Past Surgical History:   Procedure Laterality Date     ADENOIDECTOMY       COLECTOMY WITHOUT COLOSTOMY N/A 7/27/2020    Procedure: RIGHT HEMICOLECTOMY;  Surgeon: Eliane Nicole MD;  Location:  OR     ENT SURGERY      tubes     HYSTERECTOMY SUPRACERVICAL, BILATERAL SALPINGO-OOPHORECTOMY, COMBINED N/A 7/27/2020    Procedure: DIAGNOSTIC LAPAROSCOPY, EXPLORATORY LAPAROTOMY, SUPRACERVICAL HYSTERECTOMY, RIGHT SALPINGO-OOPHORECTOMY, OMENTECTOMY;  Surgeon: Eliane Nicole MD;  Location:  OR     IR CHEST PORT PLACEMENT > 5 YRS OF AGE  9/2/2020     LAPAROSCOPIC CYSTECTOMY OVARIAN (BENIGN) Bilateral 6/12/2020    Procedure: Diagnostic laparoscopy, possible ovarian torsion, bilateral ovarian cysts.;  Surgeon: Odilia Nieves MD;  Location:  OR     LAPAROTOMY EXPLORATORY N/A 12/12/2021    Procedure: EXPLORATORY LAPAROTOMY, LYSIS OF ADHESIONS, PLACEMENT OF G TUBE, AND PERITONEAL BIOPSY;  Surgeon: Mata Ibrahim MD;  Location:  OR     post partum tubal ligation                 Social History:   I have reviewed this patient's social history  Social History      Tobacco Use     Smoking status: Never Smoker     Smokeless tobacco: Never Used   Substance Use Topics     Alcohol use: Yes     Comment: rare      She is . She lives with family in [facility]. Her [address] is in [place]. She indicated that her son lives near there and picks up her mail.She has 5 children ranging between ages 12 and 21, all of whom are well. She does not use tobacco. She reports occasional alcohol use. She has worked previously on a full-time basis as a medical assistant. She has been on a medical leave of absence since initial surgery       Family History:   I have reviewed this patient's family history  Family History   Problem Relation Age of Onset     Endometriosis Mother      Fibroids Mother      Endometriosis Sister              Allergies:     Allergies   Allergen Reactions     Azithromycin      Lactose Nausea and Vomiting             Medications:   I have reviewed this patient's current medications  No medications prior to admission.     Current Facility-Administered Medications Ordered in Epic   Medication Dose Route Frequency Last Rate Last Admin     [START ON 12/16/2021] acetaminophen (TYLENOL) tablet 650 mg  650 mg Oral Q4H PRN         acetaminophen (TYLENOL) tablet 975 mg  975 mg Oral Q8H         bisacodyl (DULCOLAX) Suppository 10 mg  10 mg Rectal Daily PRN         diphenhydrAMINE (BENADRYL) capsule 25 mg  25 mg Oral Q6H PRN        Or     diphenhydrAMINE (BENADRYL) injection 25 mg  25 mg Intravenous Q6H PRN         enoxaparin ANTICOAGULANT (LOVENOX) injection 40 mg  40 mg Subcutaneous Q24H         famotidine (PEPCID) injection 20 mg  20 mg Intravenous BID   20 mg at 12/13/21 0912     HYDROmorphone (DILAUDID) injection 0.2 mg  0.2 mg Intravenous Q2H PRN   0.2 mg at 12/13/21 0917    Or     HYDROmorphone (DILAUDID) injection 0.4 mg  0.4 mg Intravenous Q2H PRN   0.4 mg at 12/13/21 0654     ketorolac (TORADOL) injection 15 mg  15 mg Intravenous Q6H PRN   15 mg at 12/13/21  1114     lactated ringers infusion   Intravenous Continuous 125 mL/hr at 12/13/21 0640 New Bag at 12/13/21 0640     lidocaine (LMX4) cream   Topical Q1H PRN         lidocaine 1 % 0.1-1 mL  0.1-1 mL Other Q1H PRN         metoprolol (LOPRESSOR) injection 5 mg  5 mg Intravenous Q6H PRN         naloxone (NARCAN) injection 0.2 mg  0.2 mg Intravenous Q2 Min PRN        Or     naloxone (NARCAN) injection 0.4 mg  0.4 mg Intravenous Q2 Min PRN        Or     naloxone (NARCAN) injection 0.2 mg  0.2 mg Intramuscular Q2 Min PRN        Or     naloxone (NARCAN) injection 0.4 mg  0.4 mg Intramuscular Q2 Min PRN         ondansetron (ZOFRAN-ODT) ODT tab 4 mg  4 mg Oral Q6H PRN        Or     ondansetron (ZOFRAN) injection 4 mg  4 mg Intravenous Q6H PRN         oxyCODONE (ROXICODONE) tablet 5 mg  5 mg Oral Q4H PRN        Or     oxyCODONE (ROXICODONE) tablet 10 mg  10 mg Oral Q4H PRN         prochlorperazine (COMPAZINE) injection 10 mg  10 mg Intravenous Q6H PRN        Or     prochlorperazine (COMPAZINE) tablet 10 mg  10 mg Oral Q6H PRN         sodium chloride (PF) 0.9% PF flush 3 mL  3 mL Intracatheter Q8H         sodium chloride (PF) 0.9% PF flush 3 mL  3 mL Intracatheter q1 min prn         No current Epic-ordered outpatient medications on file.             Review of Systems:     The 10 point Review of Systems is negative other than noted in the HPI.            Data:   Data   Results for orders placed or performed during the hospital encounter of 12/12/21 (from the past 24 hour(s))   CBC with platelets differential    Narrative    The following orders were created for panel order CBC with platelets differential.  Procedure                               Abnormality         Status                     ---------                               -----------         ------                     CBC with platelets and d...[[id number]]  Abnormal            Final result                 Please view results for these tests on the individual orders.    Comprehensive metabolic panel   Result Value Ref Range    Sodium 143 133 - 144 mmol/L    Potassium 3.4 3.4 - 5.3 mmol/L    Chloride 109 94 - 109 mmol/L    Carbon Dioxide (CO2) 29 20 - 32 mmol/L    Anion Gap 5 3 - 14 mmol/L    Urea Nitrogen 9 7 - 30 mg/dL    Creatinine 0.78 0.52 - 1.04 mg/dL    Calcium 9.2 8.5 - 10.1 mg/dL    Glucose 91 70 - 99 mg/dL    Alkaline Phosphatase 98 40 - 150 U/L    AST 24 0 - 45 U/L    ALT 26 0 - 50 U/L    Protein Total 7.0 6.8 - 8.8 g/dL    Albumin 3.4 3.4 - 5.0 g/dL    Bilirubin Total 0.5 0.2 - 1.3 mg/dL    GFR Estimate >90 >60 mL/min/1.73m2   ABO/Rh type and screen    Narrative    The following orders were created for panel order ABO/Rh type and screen.  Procedure                               Abnormality         Status                     ---------                               -----------         ------                     Adult Type and Screen[[id number]]                            Final result                 Please view results for these tests on the individual orders.   CBC with platelets and differential   Result Value Ref Range    WBC Count 6.2 4.0 - 11.0 10e3/uL    RBC Count 4.72 3.80 - 5.20 10e6/uL    Hemoglobin 12.4 11.7 - 15.7 g/dL    Hematocrit 38.3 35.0 - 47.0 %    MCV 81 78 - 100 fL    MCH 26.3 (L) 26.5 - 33.0 pg    MCHC 32.4 31.5 - 36.5 g/dL    RDW 14.0 10.0 - 15.0 %    Platelet Count 248 150 - 450 10e3/uL    % Neutrophils 57 %    % Lymphocytes 36 %    % Monocytes 6 %    % Eosinophils 1 %    % Basophils 0 %    % Immature Granulocytes 0 %    NRBCs per 100 WBC 0 <1 /100    Absolute Neutrophils 3.5 1.6 - 8.3 10e3/uL    Absolute Lymphocytes 2.2 0.8 - 5.3 10e3/uL    Absolute Monocytes 0.4 0.0 - 1.3 10e3/uL    Absolute Eosinophils 0.0 0.0 - 0.7 10e3/uL    Absolute Basophils 0.0 0.0 - 0.2 10e3/uL    Absolute Immature Granulocytes 0.0 <=0.4 10e3/uL    Absolute NRBCs 0.0 10e3/uL   Adult Type and Screen   Result Value Ref Range    ABO/RH(D) A POS     Antibody Screen Negative  Negative    SPECIMEN EXPIRATION DATE [id number]    Abd/pelvis CT no contrast - [facility] Protocol    Narrative    EXAM: CT ABDOMEN PELVIS W/O CONTRAST  LOCATION: [facility]  DATE/TIME: 12/12/2021 9:54 PM    INDICATION: Abdominal pain with emesis.  COMPARISON: 12/03/2021.  TECHNIQUE: CT scan of the abdomen and pelvis was performed without IV contrast. Multiplanar reformats were obtained. Dose reduction techniques were used.  CONTRAST: None.    FINDINGS:   LOWER CHEST: Small esophageal hiatal hernia.    HEPATOBILIARY: Normal.    PANCREAS: Normal.    SPLEEN: Normal.    ADRENAL GLANDS: Normal.    KIDNEYS/BLADDER: Normal.    BOWEL: New tiny amount of ascites. Within the lower central abdomen there is a dilated loop of small bowel making a loop on itself measuring approximately 2.6 cm in diameter. There is narrowing in 2 locations and findings are compatible with a   closed-loop obstruction. Peritoneal nodularity not as well appreciated without IV contrast material. There is wall thickening in the sigmoid colon and rectum probably due to proctocolitis. Ileocolonic anastomosis.    LYMPH NODES: Normal.    VASCULATURE: Unremarkable.    PELVIC ORGANS: Normal.    MUSCULOSKELETAL: Normal.      Impression    IMPRESSION:   1.  Within the lower central abdomen there is a locally dilated small bowel loop narrowed in 2 locations compatible with closed-loop obstruction. Trace ascites has developed.    2.  Ileocolonic anastomosis.    3.  Wall thickening sigmoid colon and rectum likely due to proctocolitis as seen previously.    4.  The peritoneal nodularity seen on the prior exam not as well appreciated without IV contrast material.    NOTE: ABNORMAL REPORT    THE DICTATION ABOVE DESCRIBES AN ABNORMALITY FOR WHICH FOLLOW-UP IS NEEDED.      Asymptomatic COVID-19 Virus (Coronavirus) by PCR Nasopharyngeal    Specimen: Nasopharyngeal; Swab   Result Value Ref Range    SARS CoV2 PCR Negative Negative    Narrative     Testing was performed using the aditi  SARS-CoV-2 & Influenza A/B Assay on the aditi  Michael[employer]  System.  This test should be ordered for the detection of SARS-COV-2 in individuals who meet SARS-CoV-2 clinical and/or epidemiological criteria. Test performance is unknown in asymptomatic patients.  This test is for in vitro diagnostic use under the FDA EUA for laboratories certified under CLIA to perform moderate and/or high complexity testing. This test has not been FDA cleared or approved.  A negative test does not rule out the presence of PCR inhibitors in the specimen or target RNA in concentration below the limit of detection for the assay. The possibility of a false negative should be considered if the patient's recent exposure or clinical presentation suggests COVID-19.  [facility] are certified under the Clinical Laboratory Improvement Amendments of 1988 (CLIA-88) as qualified to perform moderate and/or high complexity laboratory testing.   Glucose by meter   Result Value Ref Range    GLUCOSE BY METER POCT 103 (H) 70 - 99 mg/dL   Glucose by meter   Result Value Ref Range    GLUCOSE BY METER POCT 135 (H) 70 - 99 mg/dL   Creatinine   Result Value Ref Range    Creatinine 0.76 0.52 - 1.04 mg/dL    GFR Estimate >90 >60 mL/min/1.73m2

## 2021-12-13 NOTE — CONSULTS
36-year-old woman followed by Dr. Mesa with stage IV high-grade goblet cell carcinoid tumor of the appendix initially diagnosed in the spring 2020.  Completed debulking laparotomy and then in September 2020 began chemotherapy with FOLFOX.  Good response and has been off treatment since February 2021.    Now with bowel obstruction and evidence of recurrence. Completed laparotomy with lysis of adhesions, placement of a G-tube, and peritoneal biopsy today.    Await pathology results and recovery from her current surgery.    Dr. Mesa will follow up postoperatively and when she is recovered further from surgery consider reinitiation of chemotherapy, likely with a regimen such as FOLFIRI

## 2021-12-13 NOTE — OP NOTE
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    PREOPERATIVE MEDICATIONS: Ancef    SURGEON:  Mata Ibrahim MD    ASSISTANT:  Rambo Magaña PA-C.  Assistant was required owing to challenging exposure and need for retraction.    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 She underwent tumor debulking with a abdominal hysterectomy and bilateral salpingo-oophorectomy, omentectomy, and right hemicolectomy. She also underwent adjuvant chemotherapy. Patient had been doing tolerably well but was having some abdominal symptoms of pain and nausea recently. Underwent upper endoscopy and there was concern that she may have recurrent disease. She was scheduled to meet with her medical oncology team when she began having increasing pain which was quite severe in nature. She presented to the emergency department and a CT scan suggested a closed-loop bowel obstruction. Her labs were normal. Given my concern for possible ischemic bowel and perforation, I recommended emergent laparotomy.    PROCEDURE:  The patient was taken to the operating suite and uneventfully endotracheally intubated.  The abdomen was prepped and draped in a sterile fashion.  Surgeon initiated timeout was acknowledged. We made a incision along the previous midline scar and took this down through skin and subcutaneous tissue. Fascia was encountered and was divided along the midline. There was adequate space when we got into the abdominal cavity. We did not identify a high-grade bowel obstruction. Grover wound retractor was placed. We began  delivering small bowel loops and immediately noted that there were innumerable sizable mesenteric and small bowel masses consistent with recurrent carcinoma. I ran the bowel proximally and encountered several areas with adhesions to adjacent structures and sizable metastatic deposits, some up to 1 cm in size. We encountered several deposits along the surface of the liver, the transverse colon, and the anterior surface of the stomach. We began running the bowel distally and encountered what appeared to be the closed-loop which had been commented on in the CT scan. This was densely tethered down into the pelvis behind the bladder and pubic symphysis. I spent a significant amount of time trying to deliver this out of the pelvis but unfortunately, the loop was densely adherent to a diffuse peritoneal tumor deposit and was unable to be freed without significant risk to the patient's bladder and small bowel blood supply. As the bowel loop in question was completely viable and did not appear to be completely obstructed, I elected to leave it in situ. I did take a biopsy of one of the peritoneal lesions. I felt that additional bowel obstructions were an inevitability in this situation and elected to place a venting gastrostomy. An eighteen Kyrgyz G-tube was brought through the abdominal wall and inserted into the body of the stomach. This was secured with a 2-0 silk pursestring suture and the stomach was secured to the underside of the abdominal wall with additional sutures. Abdomen was washed out. We closed the fascia along the midline with a pair of looped 0 Maxon sutures. Skin was closed with surgical staples.  The patient was uneventfully extubated, awakened and taken to the PACU in stable condition.  At the conclusion of the case, all lap and needle counts were correct.      ESTIMATED BLOOD LOSS: 5 mL    INTRAOPERATIVE FINDINGS: Diffuse metastatic carcinoma throughout the small bowel, small bowel mesentery, peritoneal  surfaces, liver, stomach, and transverse colon.    Mata Ibrahim MD, MD

## 2021-12-13 NOTE — ANESTHESIA PREPROCEDURE EVALUATION
Anesthesia Pre-Procedure Evaluation    Patient: Jefe Blanc   MRN: [id number] : 1980        Preoperative Diagnosis: Small bowel obstruction (H) [K56.609]    Procedure : Procedure(s):  LAPAROTOMY          Past Medical History:   Diagnosis Date     Anxiety      Diverticulitis     of esophagus     Ovarian cancer (H)      Ovarian cyst       Past Surgical History:   Procedure Laterality Date     ADENOIDECTOMY       COLECTOMY WITHOUT COLOSTOMY N/A 2020    Procedure: RIGHT HEMICOLECTOMY;  Surgeon: Eliane Nicole MD;  Location:  OR     ENT SURGERY      tubes     HYSTERECTOMY SUPRACERVICAL, BILATERAL SALPINGO-OOPHORECTOMY, COMBINED N/A 2020    Procedure: DIAGNOSTIC LAPAROSCOPY, EXPLORATORY LAPAROTOMY, SUPRACERVICAL HYSTERECTOMY, RIGHT SALPINGO-OOPHORECTOMY, OMENTECTOMY;  Surgeon: Eliane Nicole MD;  Location:  OR     IR CHEST PORT PLACEMENT > 5 YRS OF AGE  2020     LAPAROSCOPIC CYSTECTOMY OVARIAN (BENIGN) Bilateral 2020    Procedure: Diagnostic laparoscopy, possible ovarian torsion, bilateral ovarian cysts.;  Surgeon: Odilia Nieves MD;  Location:  OR     post partum tubal ligation        Allergies   Allergen Reactions     Azithromycin      Lactose Nausea and Vomiting      Social History     Tobacco Use     Smoking status: Never Smoker     Smokeless tobacco: Never Used   Substance Use Topics     Alcohol use: Yes     Comment: rare      Wt Readings from Last 1 Encounters:   21 68.9 kg (152 lb)        Anesthesia Evaluation   Pt has had prior anesthetic.     No history of anesthetic complications       ROS/MED HX  ENT/Pulmonary:    (-) sleep apnea   Neurologic:    (-) no CVA   Cardiovascular:    (-) CAD   METS/Exercise Tolerance:     Hematologic:       Musculoskeletal:       GI/Hepatic: Comment: Admitted with abdominal pain and vomiting - concern for SBO   (-) GERD   Renal/Genitourinary:       Endo:    (-) Type II DM   Psychiatric/Substance Use:     (+) psychiatric  history anxiety     Infectious Disease:       Malignancy:   (+) Malignancy, History of GI.    Other:               OUTSIDE LABS:  CBC:   Lab Results   Component Value Date    WBC 6.2 12/12/2021    WBC 5.2 04/15/2021    HGB 12.4 12/12/2021    HGB 13.3 04/15/2021    HCT 38.3 12/12/2021    HCT 40.4 04/15/2021     12/12/2021     04/15/2021     BMP:   Lab Results   Component Value Date     12/12/2021     04/15/2021    POTASSIUM 3.4 12/12/2021    POTASSIUM 3.8 04/15/2021    CHLORIDE 109 12/12/2021    CHLORIDE 106 04/15/2021    CO2 29 12/12/2021    CO2 29 04/15/2021    BUN 9 12/12/2021    BUN 9 04/15/2021    CR 0.78 12/12/2021    CR 0.74 04/15/2021    GLC 91 12/12/2021    GLC 91 04/15/2021     COAGS:   Lab Results   Component Value Date    PTT 26 07/29/2020    INR 1.07 09/02/2020     POC:   Lab Results   Component Value Date     (H) 07/29/2020     HEPATIC:   Lab Results   Component Value Date    ALBUMIN 3.4 12/12/2021    PROTTOTAL 7.0 12/12/2021    ALT 26 12/12/2021    AST 24 12/12/2021    ALKPHOS 98 12/12/2021    BILITOTAL 0.5 12/12/2021     OTHER:   Lab Results   Component Value Date    LACT 0.7 07/31/2020    IRON 9.2 12/12/2021    LIPASE 81 04/15/2021       Anesthesia Plan    ASA Status:  3, emergent       Anesthesia Type: General.     - Airway: ETT   Induction: Propofol, Intravenous, RSI.   Maintenance: Balanced.        Consents    Anesthesia Plan(s) and associated risks, benefits, and realistic alternatives discussed. Questions answered and patient/representative(s) expressed understanding.    - Discussed:     - Discussed with:  Patient         Postoperative Care    Pain management: Multi-modal analgesia.   PONV prophylaxis: Ondansetron (or other 5HT-3), Dexamethasone or Solumedrol     Comments:                Nataliia Lopez MD, MD

## 2021-12-13 NOTE — PHARMACY-ADMISSION MEDICATION HISTORY
Pharmacy Medication History  Admission medication history interview status for the 12/12/2021  admission is complete. See EPIC admission navigator for prior to admission medications     Location of Interview: Patient room  Medication history sources: Patient, Surescripts and Chart review    Significant changes made to the medication list:  REMOVED: lisinopril and citalopram (last dispense in [facility] 05/2021, pt stopped taking both in June 2021 per 8/27/21 office visit note)    Additional medication history information:   Pt denies taking any home medications prior to admission, including citalopram and lisinopril    Medication reconciliation completed by provider prior to medication history? No    Time spent in this activity: 10 minutes    Prior to Admission medications    Not on File       The information provided in this note is only as accurate as the sources available at the time of update(s)

## 2021-12-13 NOTE — BRIEF OP NOTE
[facility]    Brief Operative Note    Pre-operative diagnosis: Small bowel obstruction (H) [K56.609]  Post-operative diagnosis Bowel obstruction; carcinomatosis metastatic appendiceal cancer    Procedure: Procedure(s):  EXPLORATORY LAPAROTOMY, LYSIS OF ADHESIONS, PLACEMENT OF G TUBE, AND PERITONEAL BIOPSY  Surgeon: Surgeon(s) and Role:     * Mata Ibrahim MD - Primary     * Rambo Magaña PA-C - Assisting  Anesthesia: General   Estimated Blood Loss: 5cc    Drains: None  Specimens:   ID Type Source Tests Collected by Time Destination   1 : Peritoneal Biopsy Tissue Peritoneum SURGICAL PATHOLOGY EXAM Mata Ibrahim MD 12/13/2021  1:44 AM      Findings:   Tumor nodules found throughout bowel, mesentery, peritoneum and fascia.colon and loops of small bowel densely adherent to pelvic floor and left in place.   Complications: None.      Rambo Magaña PA-C

## 2021-12-13 NOTE — ANESTHESIA CARE TRANSFER NOTE
Patient: Jefe Blanc    Procedure: Procedure(s):  EXPLORATORY LAPAROTOMY, LYSIS OF ADHESIONS, PLACEMENT OF G TUBE, AND PERITONEAL BIOPSY       Diagnosis: Small bowel obstruction (H) [K56.609]  Diagnosis Additional Information: No value filed.    Anesthesia Type:   General     Note:    Oropharynx: oropharynx clear of all foreign objects  Level of Consciousness: awake  Oxygen Supplementation: face mask  Level of Supplemental Oxygen (L/min / FiO2): 6  Independent Airway: airway patency satisfactory and stable  Dentition: dentition unchanged  Vital Signs Stable: post-procedure vital signs reviewed and stable  Report to RN Given: handoff report given  Patient transferred to: PACU  Comments: Neuromuscular blockade reversed after TOF 4/4, spontaneous respirations, adequate tidal volumes, followed commands to voice, extubated atraumatically, extubated with suction, airway patent after extubation.  Oxygen via facemask at 6 liters per minute to PACU. Oxygen tubing connected to wall O2 in PACU, SpO2, NiBP, and EKG monitors and alarms on and functioning, Radha Hugger warmer connected to patient gown, report on patient's clinical status given to PACU RN, RN questions answered.     Handoff Report: Identifed the Patient, Identified the Reponsible Provider, Reviewed the pertinent medical history, Discussed the surgical course, Reviewed Intra-OP anesthesia mangement and issues during anesthesia, Set expectations for post-procedure period and Allowed opportunity for questions and acknowledgement of understanding      Vitals:  Vitals Value Taken Time   /100    Temp 97.5    Pulse 73    Resp 16    SpO2 100        Electronically Signed By: Christine Marie Volp Hodgkins, CRNA, APRN CRNA  December 13, 2021  2:44 AM

## 2021-12-13 NOTE — H&P
[facility]  History and Physical   General Surgery  Mata Ibrahim MD, MD       Jefe Blanc MRN# [id number]   YOB: 1980 Age: 41 year old      Date of Admission:  12/12/2021         Assessment and Plan:   Unfortunate 41-year-old female with a history of metastatic appendiceal cancer now presents with increasing abdominal pain.  CT scan suggests closed-loop small bowel obstruction.  Noncontrast CT scan, unable to assess bowel viability or inflammatory changes.  Some small amount of ascites.  Although this could be a closed-loop obstruction relating to previous surgical adhesions, it is also possible that this is caused by progressive metastatic disease.  Per report, she was thought to have a recent diagnosis of recurrence.  In any event, I fear that anything short of emergent surgical intervention could put her at risk for bowel ischemia and subsequent perforation.  I did explain that if surgery is undertaken, definitive management may be suboptimal and could include a venting gastrostomy or ileostomy.  Spoke with the patient and her family and they do agree to proceed with surgery.         Code Status:   Full Code         Primary Care Physician:   Kimberly Huizar         Chief Complaint:   Abdominal pain, nausea    History is obtained from the patient         History of Present Illness:      Jefe Blanc is a 41 year old female with history of stage IV appendiceal cancer and status post hysterectomy with bilateral oophorectomy presenting to us with complaints of having abdominal pain which awoke her from sleep this morning. The patient is currently undergoing treatment for this appendiceal cancer and is in the [facility] to see her oncology team. She has been having spells of upper abdominal pain and vomiting for which she underwent an endoscopy 2 days ago which was essentially unremarkable per her report. However, she then awoke at approximately 530 this morning  with periumbilical abdominal pain which has gotten worse throughout the day. She has had 3-4 episodes of vomiting which began this afternoon, 1 of which appeared to be darker in color and had her concern for bleeding. She finds that the pain gets incredibly intense and severe but then seems to calm down to some degree though it is always consistently there. This is different than the pain that she has been dealing with prior to this. Of note, she underwent a CT last week and was told that she likely has return of her appendiceal cancer and is currently meeting with oncology regarding options of how to treat this. She otherwise denies other complaints at this juncture such as fever, cough, rash, headaches, or diarrhea.           Past Medical History:   Jefe Blanc  has a past medical history of Anxiety, Diverticulitis, Ovarian cancer (H), and Ovarian cyst.             Past Surgical History:   Jefe Blanc  has a past surgical history that includes ENT surgery; post partum tubal ligation; adenoidectomy; Laparoscopic cystectomy ovarian (benign) (Bilateral, 6/12/2020); Hysterectomy supracervical, bilateral salpingo-oophorectomy, combined (N/A, 7/27/2020); Colectomy without colostomy (N/A, 7/27/2020); and IR Chest Port Placement > 5 Yrs of Age (9/2/2020).         Home Medications:     Prior to Admission medications    Not on File            Allergies:     Allergies   Allergen Reactions     Azithromycin      Lactose Nausea and Vomiting            Social History:   Jefe Blanc  reports that she has never smoked. She has never used smokeless tobacco. She reports current alcohol use. She reports that she does not use drugs.            Family History:   Jefe Blanc family history includes Endometriosis in her mother and sister; Fibroids in her mother.           Review of Systems:   The 10 point Review of Systems is negative other than noted in the HPI.           Physical Exam:   Blood pressure (!) 133/102,  pulse 70, temperature 98.2  F (36.8  C), temperature source Oral, resp. rate 18, weight 68.9 kg (152 lb), SpO2 95 %.  152 lbs 0 oz    Young -American female in no distress.  Patient has a pleasant affect and communicates well.   Pupils equal round and reactive to light.   No cervical lymphadenopathy or thyromegaly.   Lung fields clear, breathing comfortably.   Heart normal sinus rhythm.  No murmurs rubs or gallops.  Abdomen soft, nondistended.  Tender in infraumbilical area, no peritoneal signs or rebound.  Skin warm, dry.  No obvious rashes or lesions.           Data:   All new lab and imaging data was reviewed.  Personal review of CT scan images show a relatively small locally dilated and distended loop of small bowel just above the pelvis.  Some ascites, no generalized bowel obstruction.  Recent Labs   Lab Test 12/12/21  2125 04/15/21  2227 02/13/21  2310 09/02/20  0756 08/02/20  0723 08/01/20  0728   WBC 6.2 5.2   < > 3.7*   < > 15.7*   HGB 12.4 13.3   < > 12.0   < > 8.4*   MCV 81 86   < > 83   < > 81    286   < > 274   < > 269   INR  --   --   --  1.07  --  1.34*    < > = values in this interval not displayed.      Recent Labs   Lab Test 12/12/21  2125 04/15/21  2227    138   POTASSIUM 3.4 3.8   CHLORIDE 109 106   CO2 29 29   BUN 9 9   CR 0.78 0.74   ANIONGAP 5 3   IRON 9.2 9.9   GLC 91 91

## 2021-12-13 NOTE — CONSULTS
Consult Date: 12/13/2021    PRIMARY CARE PHYSICIAN:  TORRES Quevedo, CNP, in [place], [place].    CONSULTING PHYSICIAN:  Mata Ibrahim MD    CONSULTATION REQUEST:  Medical management.    HISTORY OF PRESENT ILLNESS:  Jefe Blanc is a 41-year-old -American female with history of stage IV appendiceal cancer, status post hysterectomy, bilateral oophorectomy.  The patient woke up with 24 hours of abdominal pain that woke her up from her sleep.  She is currently undergoing treatment for appendiceal cancer, and her Oncology team is here in the [facility].  She has had some ongoing abdominal pain and intermittent vomiting for which she underwent endoscopy, which was apparently unremarkable, according to the records.  For the past 24 hours, however, the patient has been having periumbilical abdominal pain that has gotten progressively worse with multiple episodes of vomiting.  The pain has a crampy quality with increased intensity with episodes of relief.  She did have some recent imaging studies done, which showed some possible recurrence of her appendiceal cancer.  The patient at the time of admission denied any fevers, headache, cough, rash.    In the Emergency Room, the patient was seen by Dr. Chad Trierweiler.  She was afebrile with stable vital signs.  Blood work revealed normal CBC, hemoglobin 12.4.  Electrolytes were normal.  Kidney function was normal.  LFTs were also unremarkable.  COVID test was negative.  She was given IV fluids, as well as pain medications.  A CT scan was done, which showed dilated small bowel loops, narrow in 2 locations, compatible with closed loop obstruction, trace ascites, and ileocolonic anastomosis and there was some wall thickening of the sigmoid colon and rectum, likely due to proctocolitis, as seen previously, and there was peritoneal nodularity seen on the prior exam as well, but limited due to lack of IV contrast.    The patient underwent an exploratory  laparotomy.  She underwent lysis of adhesions and had biopsy of her tumor nodules found throughout the bowel mesentery, peritoneum and fascia.  The patient is currently postop and is still sedated and not able to give additional history, other than right now, she is comfortable.  She has got a nasogastric tube that was placed.    PAST MEDICAL HISTORY:     1.  Anxiety.  2.  Diverticulitis.  3.  Ovarian cancer.  4.  Ovarian cyst.  5.  Adnexal mass.  6.  Anemia.    PAST SURGICAL HISTORY:    1.  Adenoidectomy.  2.  Colectomy.  3.  Hysterectomy.  4.  Salpingo-oophorectomy.   5.  Chest port placement.  6.  Laparoscopic cystectomy.  7.  Tubal ligation.    FAMILY HISTORY:  Positive for endometriosis, fibroids, hypertension and diabetes.    SOCIAL HISTORY:  No tobacco or alcohol.    ALLERGIES:  AZITHROMYCIN AND LACTOSE.    CURRENT HOME MEDICATION LIST:  Unverified, includes lisinopril, valacyclovir, Celexa.    REVIEW OF SYSTEMS:  Unable to provide as the patient is still postoperatively sedated.    PHYSICAL EXAMINATION:    VITAL SIGNS:  Temperature 98.4, heart rate 65, respirations 12, blood pressure 132/83, sats 100% on room air.  GENERAL:  The patient is a 41-year-old  female, resting comfortably.  She has a nasogastric tube in place.   She is arousable and conversant.  HEENT:  Pupils are equal.  Mucous membranes are moist.  Head is atraumatic.  NECK:  No JVP elevation.   PULMONARY:  Lungs are clear anteriorly.  CARDIOVASCULAR:  S1, S2, regular rate and rhythm.  GASTROINTESTINAL:  Abdomen is mildly distended.  Bowel sounds are hypoactive.  There is no tenderness, guarding.  MUSCULOSKELETAL:  No edema.  NEUROLOGIC:  She does move all 4 extremities.  SKIN:  Warm, dry, well perfused.  PSYCHIATRIC:  She is calm and cooperative.    LABORATORY AND IMAGING STUDIES:  As dictated in history of present illness.    ASSESSMENT:  Jefe Blanc is 41 with a history of appendiceal cancer with progressive abdominal pain with  recent finding of a negative EGD, but recent imaging studies showing likely cancer recurrence with increased nodularity, admitted with what appears to be closed loop bowel obstruction, status post lysis of adhesion with G-tube placement, is now postop day zero.    PLAN:     1.  Appendiceal cancer with widely metastatic disease and small bowel obstruction due to adhesions:  The patient is postop day zero for lysis of adhesions.  The patient will be made n.p.o.  She has a G-tube placed for medications if needed.  Patient will receive IV fluids, pain medications as needed, and we will defer to Surgery with respect to pain management and fluids and nutrition.  2.  Suspected recurrence of appendiceal cancer:  We will await improvement of her mentation and get further information on where she gets her oncological care.  3.  Reflux disease:  We will continue the patient on IV PPI.  She is normally on omeprazole.  4.  Deep venous thrombosis prophylaxis:  The patient with compression boots.    CODE STATUS:  FULL.    Thank you for this consultation.  We will follow the patient with you.    Teofilo Cox MD        D: 2021   T: 2021   MT: [facility]    Name:     SHANIQUE KUNZ  MRN:      [id number]        Account:      [id number]   :      1980           Consult Date: 2021     Document: [id number]

## 2021-12-13 NOTE — PROGRESS NOTES
Surgery  Patient doing tolerably well, very fatigued and uncomfortable.  I had a discussion with her about surgical findings.  She is understandably despondent.  She will have an additional conversation with medical oncology about treatment plans going forward.  In the meantime, I think we can discontinue her NG tube and place her G-tube to gravity.  Continue n.p.o. for now.  Osmany Ibrahim MD  General Surgery, Office [phone]

## 2021-12-13 NOTE — PROGRESS NOTES
[facility] Progress Note  [facility]    Health Care Directive Consult.    Reviewed chart.     Ptzaynab Isaacs was very drowsy when I visited; I briefly explained the HCD and left an [employer] form with her, as she asked me to come back another time.     SH will follow.    Whitley Felder  Associate

## 2021-12-13 NOTE — ED TRIAGE NOTES
Pt complains of abdominal pain, with recent dark blood in her stool and meghan blood in her emesis.  Pt reports stage 4 cancer of the appendix.

## 2021-12-13 NOTE — PLAN OF CARE
POD 0 EXPLORATORY LAPAROTOMY, LYSIS OF ADHESIONS, PLACEMENT OF G TUBE, AND PERITONEAL BIOPSY. Pt received difficult news today, see notes. A&Ox4. VSS on RA. Lethargic. Abd midline incision, dressing with scant dried drainage, CDI. Abd pain managed with PRN IV dil, and PRN IV Toradol. Uses bedpan for urination. Has not been OOB yet, encouraging pt to move. NG tube removed, G-tube placed open to gravity to bag drainage. NPO ex meds/ice/sips of clears as tolerated. LR running at 125 via R chest port.

## 2021-12-13 NOTE — ED NOTES
[facility]  ED Nurse Handoff Report    ED Chief complaint: Rectal Bleeding (Pt complains of abdominal pain, with recent dark blood in her stool and meghan blood in her emesis.  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 Patient with nausea/vomiting, mostly at night, for the past week. Had an EGD yesterday, which she states did not show anything. Today has been vomiting more frequently. Intermittent severe abdominal pain since this morning. Today in her emesis she thought it appeared dark like blood. Also with diarrhea over the past week, but none today.     Focused Assessment:  Central abdominal pain, intermittent. Nausea/vomiting.    Treatments and/or interventions provided:   4mg zofran, 0.5mg dilaudid    Patient's response to treatments and/or interventions:   Mild relief from pain medication    CT scan:  IMPRESSION:   1.  Within the lower central abdomen there is a locally dilated small bowel loop narrowed in 2 locations compatible with closed-loop obstruction. Trace ascites has developed.     2.  Ileocolonic anastomosis.     3.  Wall thickening sigmoid colon and rectum likely due to proctocolitis as seen previously.     4.  The peritoneal nodularity seen on the prior exam not as well appreciated without IV contrast material.    To be done/followed up on inpatient unit:  To OR    Does this patient have any cognitive concerns?: none    Activity level - Baseline/Home:  Independent  Activity Level - Current:   Independent    Patient's Preferred language: English   Needed?: No    Isolation: None  Infection: Not Applicable  Patient tested for  COVID 19 prior to admission: YES  Bariatric?: No    Vital Signs:   Vitals:    12/12/21 1833 12/12/21 2300   BP: (!) 156/106 (!) 133/102   Pulse: 87 70   Resp: 18 18   Temp: 99.1  F (37.3  C) 98.2  F (36.8  C)   TempSrc: Temporal Oral   SpO2: 96% 95%   Weight:  68.9 kg (152 lb)       Cardiac Rhythm:     Was the PSS-3 completed:   Yes  What interventions are required if any?               Family Comments: has been in contact with mom  OBS brochure/video discussed/provided to patient/family: N/A              Name of person given brochure if not patient: N/A              Relationship to patient: N/A    For the majority of the shift this patient's behavior was Green.   Behavioral interventions performed were N/A.    ED NURSE PHONE NUMBER: [id number]

## 2021-12-13 NOTE — ANESTHESIA PROCEDURE NOTES
Airway       Patient location during procedure: OR       Procedure Start/Stop Times: 12/13/2021 1:00 AM  Staff -        Anesthesiologist:  Nataliia Lopez MD       CRNA: Hodgkins, Christine Marie Volp, APRN CRNA       Performed By: CRNA  Consent for Airway        Urgency: elective  Indications and Patient Condition       Indications for airway management: marita-procedural        Final Airway Details       Final airway type: endotracheal airway       Successful airway: ETT - single  Endotracheal Airway Details        ETT size (mm): 7.0       Cuffed: yes       Successful intubation technique: video laryngoscopy       VL Blade Size: Glidescope 3       Grade View of Cords: 1       Adjucts: stylet       Position: Right       Measured from: lips       Secured at (cm): 23       Bite block used: None    Post intubation assessment        Placement verified by: capnometry, equal breath sounds and chest rise        Number of attempts at approach: 1       Number of other approaches attempted: 0       Secured with: pink tape       Ease of procedure: easy       Dentition: Intact and Unchanged

## 2021-12-13 NOTE — ED PROVIDER NOTES
History   Chief Complaint:  Rectal bleeding      HPI   Jefe Blanc is a 41 year old female with history of stage IV appendiceal cancer and status post hysterectomy with bilateral oophorectomy presenting to us with complaints of having abdominal pain which awoke her from sleep this morning. The patient is currently undergoing treatment for this appendiceal cancer and is in the [facility] to see her oncology team. She has been having spells of upper abdominal pain and vomiting for which she underwent an endoscopy 2 days ago which was essentially unremarkable per her report. However, she then awoke at approximately 530 this morning with periumbilical abdominal pain which has gotten worse throughout the day. She has had 3-4 episodes of vomiting which began this afternoon, 1 of which appeared to be darker in color and had her concern for bleeding. She finds that the pain gets incredibly intense and severe but then seems to calm down to some degree though it is always consistently there. This is different than the pain that she has been dealing with prior to this. Of note, she underwent a CT last week and was told that she likely has return of her appendiceal cancer and is currently meeting with oncology regarding options of how to treat this. She otherwise denies other complaints at this juncture such as fever, cough, rash, headaches, or diarrhea.    Review of Systems  Pertinent positives and negatives as above.  A 14-point review of systems was performed with all other systems reviewed as negative.      Allergies:  Azithromycin  Lactose    Medications:  Celexa   Lisinopril   Lorazepam   Omeprazole   Azithromycin   Mucinex   Zyrtec   Reglan     Past Medical History:     Anxiety   Diverticulitis   Ovarian cancer   Ovarian cyst   Adnexal mass   Anemia     Past Surgical History:    Adenoidectomy   Colectomy   Hysterectomy  Salpingo-oophorectomy   Chest port placement   Laparoscopy cystectomy   Tubal ligation  "    Family History:    Endometriosis   Fibroids   Hypertension   Diabetes     Social History:  Patient presents alone. She denies smoking cigarettes or drinking alcohol.    Physical Exam     Patient Vitals for the past 24 hrs:   BP Temp Temp src Pulse Resp SpO2 Height Weight   12/13/21 0023 (!) 125/94 98.6  F (37  C) Temporal 61 18 97 % 1.575 m (5' 2\") 68.9 kg (152 lb)   12/12/21 2300 (!) 133/102 98.2  F (36.8  C) Oral 70 18 95 % -- 68.9 kg (152 lb)   12/12/21 1833 (!) 156/106 99.1  F (37.3  C) Temporal 87 18 96 % -- --       Physical Exam  Eye:  Pupils are equal, round, and reactive.  Extraocular movements intact.    ENT:  No rhinorrhea.  Moist mucus membranes.  Normal tongue and tonsil.    Cardiac:  Regular rate and rhythm.  No murmurs, gallops, or rubs.    Pulmonary:  Clear to auscultation bilaterally.  No wheezes, rales, or rhonchi.    Abdomen:  Positive bowel sounds.  Abdomen is soft and non-distended, without focal tenderness.    Musculoskeletal:  Normal movement of all extremities without evidence for deficit.    Skin:  Warm and dry without rashes.    Neurologic:  Non-focal exam without asymmetric weakness or numbness.     Psychiatric:  Normal affect with appropriate interaction with examiner.      Emergency Department Course     Imaging:  Abd/pelvis CT no contrast - Stone Protocol   Final Result   IMPRESSION:    1.  Within the lower central abdomen there is a locally dilated small bowel loop narrowed in 2 locations compatible with closed-loop obstruction. Trace ascites has developed.      2.  Ileocolonic anastomosis.      3.  Wall thickening sigmoid colon and rectum likely due to proctocolitis as seen previously.      4.  The peritoneal nodularity seen on the prior exam not as well appreciated without IV contrast material.      NOTE: ABNORMAL REPORT      THE DICTATION ABOVE DESCRIBES AN ABNORMALITY FOR WHICH FOLLOW-UP IS NEEDED.            Report per radiology    Laboratory:  Labs Ordered and Resulted from " Time of ED Arrival to Time of ED Departure   CBC WITH PLATELETS AND DIFFERENTIAL - Abnormal       Result Value    WBC Count 6.2      RBC Count 4.72      Hemoglobin 12.4      Hematocrit 38.3      MCV 81      MCH 26.3 (*)     MCHC 32.4      RDW 14.0      Platelet Count 248      % Neutrophils 57      % Lymphocytes 36      % Monocytes 6      % Eosinophils 1      % Basophils 0      % Immature Granulocytes 0      NRBCs per 100 WBC 0      Absolute Neutrophils 3.5      Absolute Lymphocytes 2.2      Absolute Monocytes 0.4      Absolute Eosinophils 0.0      Absolute Basophils 0.0      Absolute Immature Granulocytes 0.0      Absolute NRBCs 0.0     COMPREHENSIVE METABOLIC PANEL - Normal    Sodium 143      Potassium 3.4      Chloride 109      Carbon Dioxide (CO2) 29      Anion Gap 5      Urea Nitrogen 9      Creatinine 0.78      Calcium 9.2      Glucose 91      Alkaline Phosphatase 98      AST 24      ALT 26      Protein Total 7.0      Albumin 3.4      Bilirubin Total 0.5      GFR Estimate >90     COVID-19 VIRUS (CORONAVIRUS) BY PCR - Normal    SARS CoV2 PCR Negative     GLUCOSE MONITOR NURSING POCT   GLUCOSE MONITOR NURSING POCT   TYPE AND SCREEN, ADULT    ABO/RH(D) A POS      Antibody Screen Negative      SPECIMEN EXPIRATION DATE [id number]     ABO/RH TYPE AND SCREEN        Emergency Department Course:  Reviewed:  I reviewed nursing notes, vitals, past medical history and Care Everywhere    Consults:  Mata Ibrahim, General Surgery    Interventions:  Dilaudid 0.5 mg IV  Normal saline bolus, 1000 ml    Disposition:  The patient was transferred to the OR under the care of Dr. Ibrahim    Impression & Plan     Medical Decision Making:  This very unfortunate 41-year-old woman with a history of stage IV appendiceal cancer presents to us with complaints of having severe abdominal pain with associated vomiting which began this morning.  While she has been dealing with abdominal pain and being worked up for this as an outpatient  through her oncologist and gastroenterologist, her pain was different and much more intense today.  She had some darker colored emesis which she thought might be related to bleeding.  She presents to us in severe discomfort, having spells where she calls out in pain because the discomfort is so intense, more comfortable in between the spells.  Her abdominal exam just shows diffuse tenderness and is otherwise soft.  Laboratory investigation was obtained which is generally unremarkable.  However, CT shows evidence of a closed loop small bowel obstruction.  With this, I spoke with Dr. Ibrahim of the general surgery team who reviewed the CT and evaluated the patient and feels the patient needs to go directly to the OR for laparotomy and repair of this potentially life-threatening process.  Patient is in support of this as well.  She feels improved after the above interventions.  She was transferred to the OR with ongoing cares per the surgical team.    Diagnosis:    ICD-10-CM    1. Small bowel obstruction (H)  K56.609 Case Request: LAPAROTOMY     Case Request: LAPAROTOMY   2. Small intestine obstruction (H)  K56.609     Closed Loop       Scribe Disclosure:  I, Loulou Kingston, am serving as a scribe at 9:14 PM on 12/12/2021 to document services personally performed by Trierweiler, Chad A, MD based on my observations and the provider's statements to me.          Trierweiler, Chad A, MD  12/13/21 0043

## 2021-12-13 NOTE — PROGRESS NOTES
[facility]    Medicine Progress Note - Hospitalist Service        Date of Admission:  12/12/2021  7:37 PM    Assessment & Plan:   Jefe Blanc is 41 with a history of appendiceal cancer with progressive abdominal pain with recent finding of a negative EGD, but recent imaging studies showing likely cancer recurrence with increased nodularity, admitted with what appears to be closed loop bowel obstruction, status post lysis of adhesion with G-tube placement     Appendiceal cancer with widely metastatic disease  Small bowel obstruction due to adhesions  -S/p exploratory laparotomy, lysis of adhesion and placement of G-tube earlier this morning   -Defer routine postop care to general surgery.  Diet advancement per general surgery.  -Medical oncology has been consulted, await their opinion.    GERD  -will continue IV PPI.      Diet: NPO for Medical/Clinical Reasons Except for: Meds, Ice Chips, Other; Specify: may have sips of clears as tolerated.     DVT Prophylaxis: Pneumatic Compression Devices   Bryan Catheter: Not present  Code Status:   Disposition Plan    Expected Discharge: Per general surgery     Entered: Arnold Soriano MD 12/13/2021, 2:50 PM        Clinically Significant Risk Factors Present on Admission                     The patient's care was discussed with the Bedside Nurse and Patient.    Arnold Soriano MD  Hospitalist Service  [facility]  Text Page 7AM-6PM  Securely message with the Vocera Web Console (learn more here)  Text page via [employer] Paging/Directory    ______________________________________________________________________    Interval History   Pain adequately controlled at the moment.  No nausea or vomiting.    Data reviewed today: I reviewed all medications, new labs and imaging results over the last 24 hours. I personally reviewed no images or EKG's today.    Physical Exam   Vital signs:  Temp: 98.2  F (36.8  C) Temp src: Oral BP: (!) 136/95  "Pulse: 64   Resp: 14 SpO2: 94 % O2 Device: None (Room air) Oxygen Delivery: 1 LPM Height: 157.5 cm (5' 2\") Weight: 68.9 kg (152 lb)  Estimated body mass index is 27.8 kg/m  as calculated from the following:    Height as of this encounter: 1.575 m (5' 2\").    Weight as of this encounter: 68.9 kg (152 lb).      Wt Readings from Last 2 Encounters:   12/13/21 68.9 kg (152 lb)   04/15/21 66.7 kg (147 lb)       Gen: AAOX3, NAD  Skin: Warm  Neuro- CN- intact. No focal deficits.  Spontaneously moving all 4 extremities      Data   Recent Labs   Lab 12/13/21  0640 12/13/21  0252 12/13/21  0018 12/12/21  2125   WBC  --   --   --  6.2   HGB  --   --   --  12.4   MCV  --   --   --  81   PLT  --   --   --  248   NA  --   --   --  143   POTASSIUM  --   --   --  3.4   CHLORIDE  --   --   --  109   CO2  --   --   --  29   BUN  --   --   --  9   CR 0.76  --   --  0.78   ANIONGAP  --   --   --  5   IRON  --   --   --  9.2   GLC  --  135* 103* 91   ALBUMIN  --   --   --  3.4   PROTTOTAL  --   --   --  7.0   BILITOTAL  --   --   --  0.5   ALKPHOS  --   --   --  98   ALT  --   --   --  26   AST  --   --   --  24       Recent Results (from the past 24 hour(s))   Abd/pelvis CT no contrast - Stone Protocol    Narrative    EXAM: CT ABDOMEN PELVIS W/O CONTRAST  LOCATION: [facility]  DATE/TIME: 12/12/2021 9:54 PM    INDICATION: Abdominal pain with emesis.  COMPARISON: 12/03/2021.  TECHNIQUE: CT scan of the abdomen and pelvis was performed without IV contrast. Multiplanar reformats were obtained. Dose reduction techniques were used.  CONTRAST: None.    FINDINGS:   LOWER CHEST: Small esophageal hiatal hernia.    HEPATOBILIARY: Normal.    PANCREAS: Normal.    SPLEEN: Normal.    ADRENAL GLANDS: Normal.    KIDNEYS/BLADDER: Normal.    BOWEL: New tiny amount of ascites. Within the lower central abdomen there is a dilated loop of small bowel making a loop on itself measuring approximately 2.6 cm in diameter. There is narrowing " in 2 locations and findings are compatible with a   closed-loop obstruction. Peritoneal nodularity not as well appreciated without IV contrast material. There is wall thickening in the sigmoid colon and rectum probably due to proctocolitis. Ileocolonic anastomosis.    LYMPH NODES: Normal.    VASCULATURE: Unremarkable.    PELVIC ORGANS: Normal.    MUSCULOSKELETAL: Normal.      Impression    IMPRESSION:   1.  Within the lower central abdomen there is a locally dilated small bowel loop narrowed in 2 locations compatible with closed-loop obstruction. Trace ascites has developed.    2.  Ileocolonic anastomosis.    3.  Wall thickening sigmoid colon and rectum likely due to proctocolitis as seen previously.    4.  The peritoneal nodularity seen on the prior exam not as well appreciated without IV contrast material.    NOTE: ABNORMAL REPORT    THE DICTATION ABOVE DESCRIBES AN ABNORMALITY FOR WHICH FOLLOW-UP IS NEEDED.        Medications     lactated ringers 125 mL/hr at 12/13/21 0640       acetaminophen  975 mg Oral Q8H     enoxaparin ANTICOAGULANT  40 mg Subcutaneous Q24H     famotidine  20 mg Intravenous BID     sodium chloride (PF)  3 mL Intracatheter Q8H

## 2021-12-13 NOTE — PLAN OF CARE
Pt here with POD0 exp laparoscopy and lysis of adhesions with G-tube placement. Drowsy but arousable, oriented x4. VSS on 4L, weaned to 2L. LS clear but shallow. NPO ex ice, NG tube to LIS with sm bloody output. No OOB activity, can use bedpan for voiding. C/o 9/10 abdominal pain, managing with IV dilaudid and toradol, ice as tolerated.  R port infusing LR at 125ml/h. L PIV SL. Midline incision with sm amnt serosanguineous drainage, no change overnight. Hem/Onc consult. Discharge pending, continue to monitor.

## 2021-12-13 NOTE — PROGRESS NOTES
Brief consultation.    41 year old female with hx of appendiceal ca with suspected recurrence with widespread mets admitted with sbo and underwent KIN and G tube placement.  Full dictation pending      Teofilo Cox MD

## 2021-12-13 NOTE — ANESTHESIA POSTPROCEDURE EVALUATION
Patient: Jefe Blanc    Procedure: Procedure(s):  EXPLORATORY LAPAROTOMY, LYSIS OF ADHESIONS, PLACEMENT OF G TUBE, AND PERITONEAL BIOPSY       Diagnosis:Small bowel obstruction (H) [K56.609]  Diagnosis Additional Information: No value filed.    Anesthesia Type:  General    Note:     Postop Pain Control: Uneventful            Sign Out: Well controlled pain   PONV: No   Neuro/Psych: Uneventful            Sign Out: Acceptable/Baseline neuro status   Airway/Respiratory: Uneventful            Sign Out: Acceptable/Baseline resp. status   CV/Hemodynamics: Uneventful            Sign Out: Acceptable CV status; No obvious hypovolemia; No obvious fluid overload   Other NRE: NONE   DID A NON-ROUTINE EVENT OCCUR? No           Last vitals:  Vitals Value Taken Time   /94 12/13/21 0400   Temp 36.9  C (98.5  F) 12/13/21 0400   Pulse 69 12/13/21 0408   Resp 14 12/13/21 0408   SpO2 100 % 12/13/21 0408   Vitals shown include unvalidated device data.    Electronically Signed By: Nataliia Lopez MD, MD  December 13, 2021  6:22 AM

## 2021-12-13 NOTE — ED NOTES
Bed: ED14  Expected date: 12/12/21  Expected time: 7:36 PM  Means of arrival:   Comments:  Triage

## 2021-12-14 ENCOUNTER — HOME INFUSION (PRE-WILLOW HOME INFUSION) (OUTPATIENT)
Dept: PHARMACY | Facility: CLINIC | Age: 41
End: 2021-12-14
Payer: COMMERCIAL

## 2021-12-14 LAB
ALBUMIN SERPL-MCNC: 2.5 G/DL (ref 3.4–5)
ALP SERPL-CCNC: 75 U/L (ref 40–150)
ALT SERPL W P-5'-P-CCNC: 20 U/L (ref 0–50)
ANION GAP SERPL CALCULATED.3IONS-SCNC: 3 MMOL/L (ref 3–14)
AST SERPL W P-5'-P-CCNC: 21 U/L (ref 0–45)
BILIRUB SERPL-MCNC: 0.5 MG/DL (ref 0.2–1.3)
BUN SERPL-MCNC: 10 MG/DL (ref 7–30)
CALCIUM SERPL-MCNC: 8.5 MG/DL (ref 8.5–10.1)
CHLORIDE BLD-SCNC: 108 MMOL/L (ref 94–109)
CO2 SERPL-SCNC: 31 MMOL/L (ref 20–32)
CREAT SERPL-MCNC: 0.76 MG/DL (ref 0.52–1.04)
GFR SERPL CREATININE-BSD FRML MDRD: >90 ML/MIN/1.73M2
GLUCOSE BLD-MCNC: 84 MG/DL (ref 70–99)
GLUCOSE BLDC GLUCOMTR-MCNC: 88 MG/DL (ref 70–99)
INR PPP: 1.13 (ref 0.85–1.15)
MAGNESIUM SERPL-MCNC: 1.6 MG/DL (ref 1.6–2.3)
PHOSPHATE SERPL-MCNC: 2 MG/DL (ref 2.5–4.5)
POTASSIUM BLD-SCNC: 3.5 MMOL/L (ref 3.4–5.3)
PROT SERPL-MCNC: 5.8 G/DL (ref 6.8–8.8)
SODIUM SERPL-SCNC: 142 MMOL/L (ref 133–144)
TRIGL SERPL-MCNC: 146 MG/DL
TROPONIN I SERPL HS-MCNC: 14 NG/L

## 2021-12-14 PROCEDURE — 99231 SBSQ HOSP IP/OBS SF/LOW 25: CPT | Performed by: INTERNAL MEDICINE

## 2021-12-14 PROCEDURE — 258N000003 HC RX IP 258 OP 636: Performed by: INTERNAL MEDICINE

## 2021-12-14 PROCEDURE — 85610 PROTHROMBIN TIME: CPT | Performed by: NURSE PRACTITIONER

## 2021-12-14 PROCEDURE — 250N000009 HC RX 250: Performed by: NURSE PRACTITIONER

## 2021-12-14 PROCEDURE — 250N000009 HC RX 250: Performed by: INTERNAL MEDICINE

## 2021-12-14 PROCEDURE — 250N000009 HC RX 250: Performed by: PHYSICIAN ASSISTANT

## 2021-12-14 PROCEDURE — 83735 ASSAY OF MAGNESIUM: CPT | Performed by: NURSE PRACTITIONER

## 2021-12-14 PROCEDURE — 84484 ASSAY OF TROPONIN QUANT: CPT | Performed by: INTERNAL MEDICINE

## 2021-12-14 PROCEDURE — 3E0436Z INTRODUCTION OF NUTRITIONAL SUBSTANCE INTO CENTRAL VEIN, PERCUTANEOUS APPROACH: ICD-10-PCS | Performed by: INTERNAL MEDICINE

## 2021-12-14 PROCEDURE — 84100 ASSAY OF PHOSPHORUS: CPT | Performed by: NURSE PRACTITIONER

## 2021-12-14 PROCEDURE — 250N000011 HC RX IP 250 OP 636: Performed by: PHYSICIAN ASSISTANT

## 2021-12-14 PROCEDURE — 80053 COMPREHEN METABOLIC PANEL: CPT | Performed by: NURSE PRACTITIONER

## 2021-12-14 PROCEDURE — 258N000003 HC RX IP 258 OP 636: Performed by: PHYSICIAN ASSISTANT

## 2021-12-14 PROCEDURE — 250N000013 HC RX MED GY IP 250 OP 250 PS 637: Performed by: PHYSICIAN ASSISTANT

## 2021-12-14 PROCEDURE — 84478 ASSAY OF TRIGLYCERIDES: CPT | Performed by: NURSE PRACTITIONER

## 2021-12-14 PROCEDURE — 88305 TISSUE EXAM BY PATHOLOGIST: CPT | Mod: 26 | Performed by: PATHOLOGY

## 2021-12-14 PROCEDURE — 120N000001 HC R&B MED SURG/OB

## 2021-12-14 RX ORDER — DEXTROSE MONOHYDRATE 100 MG/ML
INJECTION, SOLUTION INTRAVENOUS CONTINUOUS PRN
Status: DISCONTINUED | OUTPATIENT
Start: 2021-12-14 | End: 2021-12-20 | Stop reason: HOSPADM

## 2021-12-14 RX ORDER — SODIUM CHLORIDE, SODIUM LACTATE, POTASSIUM CHLORIDE, CALCIUM CHLORIDE 600; 310; 30; 20 MG/100ML; MG/100ML; MG/100ML; MG/100ML
INJECTION, SOLUTION INTRAVENOUS CONTINUOUS
Status: ACTIVE | OUTPATIENT
Start: 2021-12-14 | End: 2021-12-15

## 2021-12-14 RX ORDER — NICOTINE POLACRILEX 4 MG
15-30 LOZENGE BUCCAL
Status: DISCONTINUED | OUTPATIENT
Start: 2021-12-14 | End: 2021-12-20 | Stop reason: HOSPADM

## 2021-12-14 RX ORDER — DEXTROSE MONOHYDRATE 25 G/50ML
25-50 INJECTION, SOLUTION INTRAVENOUS
Status: DISCONTINUED | OUTPATIENT
Start: 2021-12-14 | End: 2021-12-20 | Stop reason: HOSPADM

## 2021-12-14 RX ADMIN — ENOXAPARIN SODIUM 40 MG: 40 INJECTION SUBCUTANEOUS at 20:02

## 2021-12-14 RX ADMIN — ASCORBIC ACID, VITAMIN A PALMITATE, CHOLECALCIFEROL, THIAMINE HYDROCHLORIDE, RIBOFLAVIN-5 PHOSPHATE SODIUM, PYRIDOXINE HYDROCHLORIDE, NIACINAMIDE, DEXPANTHENOL, ALPHA-TOCOPHEROL ACETATE, VITAMIN K1, FOLIC ACID, BIOTIN, CYANOCOBALAMIN: 200; 3300; 200; 6; 3.6; 6; 40; 15; 10; 150; 600; 60; 5 INJECTION, SOLUTION INTRAVENOUS at 20:03

## 2021-12-14 RX ADMIN — SODIUM CHLORIDE, POTASSIUM CHLORIDE, SODIUM LACTATE AND CALCIUM CHLORIDE: 600; 310; 30; 20 INJECTION, SOLUTION INTRAVENOUS at 07:48

## 2021-12-14 RX ADMIN — ACETAMINOPHEN 975 MG: 325 TABLET, FILM COATED ORAL at 05:14

## 2021-12-14 RX ADMIN — HYDROMORPHONE HYDROCHLORIDE 0.2 MG: 0.2 INJECTION, SOLUTION INTRAMUSCULAR; INTRAVENOUS; SUBCUTANEOUS at 12:40

## 2021-12-14 RX ADMIN — KETOROLAC TROMETHAMINE 15 MG: 15 INJECTION, SOLUTION INTRAMUSCULAR; INTRAVENOUS at 22:33

## 2021-12-14 RX ADMIN — SODIUM CHLORIDE, POTASSIUM CHLORIDE, SODIUM LACTATE AND CALCIUM CHLORIDE: 600; 310; 30; 20 INJECTION, SOLUTION INTRAVENOUS at 15:58

## 2021-12-14 RX ADMIN — I.V. FAT EMULSION 250 ML: 20 EMULSION INTRAVENOUS at 20:02

## 2021-12-14 RX ADMIN — KETOROLAC TROMETHAMINE 15 MG: 15 INJECTION, SOLUTION INTRAMUSCULAR; INTRAVENOUS at 08:52

## 2021-12-14 RX ADMIN — HYDROMORPHONE HYDROCHLORIDE 0.2 MG: 0.2 INJECTION, SOLUTION INTRAMUSCULAR; INTRAVENOUS; SUBCUTANEOUS at 18:46

## 2021-12-14 RX ADMIN — ACETAMINOPHEN 975 MG: 325 TABLET, FILM COATED ORAL at 20:02

## 2021-12-14 RX ADMIN — KETOROLAC TROMETHAMINE 15 MG: 15 INJECTION, SOLUTION INTRAMUSCULAR; INTRAVENOUS at 01:08

## 2021-12-14 RX ADMIN — FAMOTIDINE 20 MG: 10 INJECTION, SOLUTION INTRAVENOUS at 08:52

## 2021-12-14 RX ADMIN — KETOROLAC TROMETHAMINE 15 MG: 15 INJECTION, SOLUTION INTRAMUSCULAR; INTRAVENOUS at 15:58

## 2021-12-14 RX ADMIN — FAMOTIDINE 20 MG: 10 INJECTION, SOLUTION INTRAVENOUS at 20:06

## 2021-12-14 RX ADMIN — HYDROMORPHONE HYDROCHLORIDE 0.2 MG: 0.2 INJECTION, SOLUTION INTRAMUSCULAR; INTRAVENOUS; SUBCUTANEOUS at 09:54

## 2021-12-14 RX ADMIN — SODIUM PHOSPHATE, MONOBASIC, MONOHYDRATE 9 MMOL: 276; 142 INJECTION, SOLUTION INTRAVENOUS at 20:02

## 2021-12-14 RX ADMIN — HYDROMORPHONE HYDROCHLORIDE 0.2 MG: 0.2 INJECTION, SOLUTION INTRAMUSCULAR; INTRAVENOUS; SUBCUTANEOUS at 06:23

## 2021-12-14 ASSESSMENT — ACTIVITIES OF DAILY LIVING (ADL)
ADLS_ACUITY_SCORE: 10
ADLS_ACUITY_SCORE: 10
ADLS_ACUITY_SCORE: 7
ADLS_ACUITY_SCORE: 10
ADLS_ACUITY_SCORE: 8
ADLS_ACUITY_SCORE: 10
DOING_ERRANDS_INDEPENDENTLY_DIFFICULTY: NO
ADLS_ACUITY_SCORE: 10
ADLS_ACUITY_SCORE: 10
ADLS_ACUITY_SCORE: 7
PATIENT_/_FAMILY_COMMUNICATION_STYLE: SPOKEN LANGUAGE (ENGLISH OR BILINGUAL)
ADLS_ACUITY_SCORE: 7
ADLS_ACUITY_SCORE: 10
CONCENTRATING,_REMEMBERING_OR_MAKING_DECISIONS_DIFFICULTY: NO
WEAR_GLASSES_OR_BLIND: NO
DRESSING/BATHING_DIFFICULTY: NO
ADLS_ACUITY_SCORE: 10
TOILETING_ISSUES: NO
ADLS_ACUITY_SCORE: 10
FALL_HISTORY_WITHIN_LAST_SIX_MONTHS: NO
HEARING_DIFFICULTY_OR_DEAF: NO
ADLS_ACUITY_SCORE: 7
WALKING_OR_CLIMBING_STAIRS_DIFFICULTY: NO
ADLS_ACUITY_SCORE: 10
ADLS_ACUITY_SCORE: 7
DIFFICULTY_COMMUNICATING: NO
DIFFICULTY_EATING/SWALLOWING: NO

## 2021-12-14 NOTE — PROGRESS NOTES
[facility]    Medicine Progress Note - Hospitalist Service        Date of Admission:  12/12/2021  7:37 PM    Assessment & Plan:   Jefe Blanc is 41 with a history of appendiceal cancer with progressive abdominal pain with recent finding of a negative EGD, but recent imaging studies showing likely cancer recurrence with increased nodularity, admitted with what appears to be closed loop bowel obstruction, status post lysis of adhesion with G-tube placement     Appendiceal cancer with widely metastatic disease  Small bowel obstruction due to adhesions  -S/p exploratory laparotomy, lysis of adhesion and placement of G-tube earlier this morning   -Defer routine postop care to general surgery.  Diet advancement per general surgery.  -Medical oncology following.    GERD  -will continue IV PPI.      Diet: NPO for Medical/Clinical Reasons Except for: Meds, Ice Chips, Other; Specify: may have sips of clears as tolerated.     DVT Prophylaxis: Pneumatic Compression Devices   Bryan Catheter: Not present  Code Status:   Disposition Plan    Expected Discharge: Per general surgery, hospitalist service will sign off for now.     Entered: Arnold Soriano MD 12/14/2021, 11:28 AM        Clinically Significant Risk Factors Present on Admission                  The patient's care was discussed with the Bedside Nurse and Patient.    Arnold Soriano MD  Hospitalist Service  [facility]  Text Page 7AM-6PM  Securely message with the Vocera Web Console (learn more here)  Text page via [employer] Paging/Directory    ______________________________________________________________________    Interval History   Complaining of pain around her incision site.  No return of bowel function yet.  Denies nausea or vomiting.    Data reviewed today: I reviewed all medications, new labs and imaging results over the last 24 hours. I personally reviewed no images or EKG's today.    Physical Exam   Vital  "signs:  Temp: 99.3  F (37.4  C) Temp src: Oral BP: (!) 135/95 Pulse: 78   Resp: 16 SpO2: 97 % O2 Device: None (Room air) Oxygen Delivery: 1 LPM Height: 157.5 cm (5' 2\") Weight: 68.9 kg (152 lb)  Estimated body mass index is 27.8 kg/m  as calculated from the following:    Height as of this encounter: 1.575 m (5' 2\").    Weight as of this encounter: 68.9 kg (152 lb).      Wt Readings from Last 2 Encounters:   12/13/21 68.9 kg (152 lb)   04/15/21 66.7 kg (147 lb)       Gen: AAOX3, NAD  Resp: Clear to auscultation bilaterally, normal effort of breathing  GI: Mild nonspecific tenderness, mild distention, dressing in place  Skin: Warm, no rash  Neuro- CN- intact. No focal deficits.        Data   Recent Labs   Lab 12/14/21  0631 12/13/21  0640 12/13/21  0252 12/13/21  0018 12/12/21  2125   WBC  --   --   --   --  6.2   HGB  --   --   --   --  12.4   MCV  --   --   --   --  81   PLT  --   --   --   --  248   NA  --   --   --   --  143   POTASSIUM  --   --   --   --  3.4   CHLORIDE  --   --   --   --  109   CO2  --   --   --   --  29   BUN  --   --   --   --  9   CR  --  0.76  --   --  0.78   ANIONGAP  --   --   --   --  5   IRON  --   --   --   --  9.2   GLC 88  --  135* 103* 91   ALBUMIN  --   --   --   --  3.4   PROTTOTAL  --   --   --   --  7.0   BILITOTAL  --   --   --   --  0.5   ALKPHOS  --   --   --   --  98   ALT  --   --   --   --  26   AST  --   --   --   --  24       No results found for this or any previous visit (from the past 24 hour(s)).  Medications     lactated ringers 125 mL/hr at 12/14/21 0748       acetaminophen  975 mg Oral Q8H     enoxaparin ANTICOAGULANT  40 mg Subcutaneous Q24H     famotidine  20 mg Intravenous BID     sodium chloride (PF)  3 mL Intracatheter Q8H               "

## 2021-12-14 NOTE — PROGRESS NOTES
"SPIRITUAL HEALTH SERVICES Progress Note  [facility]    Follow-Up for HCD.    Ferny was watching Delfino movies when I entered; she said she \"loves everything Delfino.\"    Ferny shared thoughts about family and family dynamics, wondering how [place] will be celebrated with her illness, and being open to \"God's plan.\" She spoke of trusting there's a reason for everything that happens.    She said she is hopeful she will \"beat the cancer again,\" and she prays a lot, \"talking to God all the time.\"    Ferny shared that her mother is expected to come and help, and we explored feelings around needing and allowing for support from others.    I also reviewed the HCD with her, and we agreed that I would set up a time tomorrow with [employer] for notarization.    I offered reflective conversation and emotional support, and said a blessing.    I then arranged with [employer] for notarization tomorrow at 1.30.    SH will follow.    Whitley Felder  Associate   "

## 2021-12-14 NOTE — PROGRESS NOTES
Therapy: Enteral  Insurance: [facility]    Enteral is covered at 100% via tube    In reference to admission on 12/12/21 to check enteral coverage    Please contact Intake with any questions, [phone] or In Basket pool, [facility] ([phone]).

## 2021-12-14 NOTE — PROGRESS NOTES
[facility] Progress Note     Primary Oncologist/Hematologist:  Bonita          Assessment and Plan:     1. 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  Recent CT imaging was highly concerning for recurrence of her cancer.  She has since developed symptoms of obstruction and has undergone EXPLORATORY LAPAROTOMY, LYSIS OF ADHESIONS, PLACEMENT OF G TUBE, AND PERITONEAL BIOPSY on 12/12/21. Pathology is pending.      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    We are investigating whether an assay that can determine if  the patient has a homologous recombination deficiency as it could be be very helpful in determining whether PARP inhibition could be used at some point in the future.     Plan to repeat genetic mutation studies on current biopsy.     Ferny previously has a prolonged response to FOLFOX following initial surgical resection. She did however develop a hypersensitivity reaction to the oxaliplatinum which required discontinuation of this medication. There  is a significant risk of this problem recurring if she were to receive the same medication again. Dr. Mesa is therefore recommending the  FOLFIRI regimen (irinotecan, bolus 5FU followed by infusional 5FU over 46 hours).  We reviewed the side effects typically seen with irinotecan including risk of low blood counts, diarrhea and hair loss,     Ferny was tentatively going to begin this regimen on Wednesday.  With her surgery, we would anticipate initiating chemo in our clinic in about 2 weeks, after she has had time to recover.  Pt will need nutritional support, likely with TPN. Will order pharmacy/nutriton consult for TPN to begin while she is hospitalized. .  She has a portacath.  Her chemotherapy will include a 46 hour continuous infusion which will not be compatible with TPN.  She may need a PICC line placed in a few weeks  "when we plan to begin chemo    2.  GI bleeding.  Recent report of passing blood and mucous per rectum. Hgb is 12.4 compared to 12.7 in office on 12/8/21  - 12/10/21:  EGD by Dr. Kerr.  No mass lesion or obvious bleeding.   - monitor hgb.      3. 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 Likely due to prior oxaliplatin Her symptoms have now largely resolved.     4.  Thyroid goiter. Recent CT scan shows the hyroid goiter appears unchanged.     Discussed with Dr. Mesa, TORRES Fay PA-C, AOCNP  Nurse Practitioner  [facility]  [phone]          Interval History:     Feeling better today. Mild post-op pain, but prior bowel obstruction pain is better.               Review of Systems:     The 5 point Review of Systems is negative other than noted in the HPI                Medications:   Scheduled Medications    acetaminophen  975 mg Oral Q8H     enoxaparin ANTICOAGULANT  40 mg Subcutaneous Q24H     famotidine  20 mg Intravenous BID     sodium chloride (PF)  3 mL Intracatheter Q8H     PRN Medications  [START ON 12/16/2021] acetaminophen, bisacodyl, diphenhydrAMINE **OR** diphenhydrAMINE, HYDROmorphone **OR** HYDROmorphone, ketorolac, lidocaine 4%, lidocaine viscous 2% 15 mL and maalox/mylanta w/ simethicone 15 mL (GI COCKTAIL), lidocaine (buffered or not buffered), metoprolol, naloxone **OR** naloxone **OR** naloxone **OR** naloxone, ondansetron **OR** ondansetron, oxyCODONE **OR** oxyCODONE, prochlorperazine **OR** prochlorperazine, sodium chloride (PF)               Physical Exam:   Vitals were reviewed  Blood pressure (!) 135/95, pulse 78, temperature 99.3  F (37.4  C), temperature source Oral, resp. rate 16, height 1.575 m (5' 2\"), weight 68.9 kg (152 lb), SpO2 97 %.  Wt Readings from Last 4 Encounters:   12/13/21 68.9 kg (152 lb)   04/15/21 66.7 kg (147 lb)   02/13/21 64.4 kg (142 lb)   08/05/20 72.3 kg (159 lb 6.3 oz)       I/O last 3 " completed shifts:  In: 110 [P.O.:100; NG/GT:10]  Out: 650 [Urine:475; Emesis/NG output:175]                 Data:   All laboratory data and imaging studies reviewed.    CMP  Recent Labs   Lab 12/14/21  0631 12/13/21  0640 12/13/21  0252 12/13/21  0018 12/12/21 2125   NA  --   --   --   --  143   POTASSIUM  --   --   --   --  3.4   CHLORIDE  --   --   --   --  109   CO2  --   --   --   --  29   ANIONGAP  --   --   --   --  5   GLC 88  --  135* 103* 91   BUN  --   --   --   --  9   CR  --  0.76  --   --  0.78   GFRESTIMATED  --  >90  --   --  >90   IRON  --   --   --   --  9.2   PROTTOTAL  --   --   --   --  7.0   ALBUMIN  --   --   --   --  3.4   BILITOTAL  --   --   --   --  0.5   ALKPHOS  --   --   --   --  98   AST  --   --   --   --  24   ALT  --   --   --   --  26     CBC  Recent Labs   Lab 12/12/21 2125   WBC 6.2   RBC 4.72   HGB 12.4   HCT 38.3   MCV 81   MCH 26.3*   MCHC 32.4   RDW 14.0        INRNo lab results found in last 7 days.        Ericka MACDONALD, CELINEP  Nurse Practitioner  [facility]  [phone]

## 2021-12-14 NOTE — PLAN OF CARE
POD 0 from a EXPLORATORY LAPAROTOMY, LYSIS OF ADHESIONS, PLACEMENT OF G TUBE, AND PERITONEAL BIOPSY. A&Ox4. CMS intact. Bowel sounds audible, - flatus,  NPO ex ice. VSS on RA. Dressing abd CDI. G tube open to gravity. Viods with Bedpan, refuse to get out of bed. C/o abd pain, decreased with PRN toradol & iv dilaudid. C/o of chest pain, tele is SR, pt refused GI cocktail, state she is feeling better. Pt scoring green on the Aggression Stop Light Tool. Plan to continue current care. Discharge pending.

## 2021-12-14 NOTE — PROVIDER NOTIFICATION
"MD Notification    Notified Person: MD    Notified Person Name: Dr. Luis     Notification Date/Time: 10:23 PM  12/13/21  Notification Interaction: vocera aged     Purpose of Notification:Pt c/o chest pain located at mid chest. States it feels like something small and round in her chest. Encouraged ambulation and sitting upright to help with any gas pains or GERD but pt declined. Pain meds given for abd pain. EKG done and showing NSR. Do you want any further workup?    Orders Received: GI cocktail and troponin     Comments: \"EKG unremarkable, could also be hiatal hernia\"     "

## 2021-12-14 NOTE — PLAN OF CARE
POD 1 EXPLORATORY LAPAROTOMY, LYSIS OF ADHESIONS, PLACEMENT OF G TUBE, AND PERITONEAL BIOPSY. A&Ox4. VSS on RA. Abd midline incision, dressing with scant dried drainage, CDI. Abd pain managed with PRN IV dil, and PRN IV Toradol. Up A1/W to BR. G-tube flushed with 10 mL tap water and open to gravity to bag drainage. NPO ex meds/ice/sips of clears as tolerated. LR running at 125 via R chest port. Plan pending for starting TPN.

## 2021-12-14 NOTE — PLAN OF CARE
Pt is a POD #1 for exploratory laparotomy, lysis of adhesion, peritoneal biopsy and placement of a g-tube. A & O X4. A x1 but has not been OOB this shift. VSS on RA. Reported pain managed with prn toradol and dilaudid. PIV SL. G-tube in place which is open to gravity to a bag, flushed with 10 mls of water and draining 175 mls this shift. Rt chest Port running LR at 125 mls/hr. Pt has been voiding without difficulty using a bedpan. NPO ex meds/ice/sips of clears as tolerated. Gen surgery and oncology following.

## 2021-12-14 NOTE — PROGRESS NOTES
"Surgery    Feeling some lower abdominal muscle cramping/tension.   No bowel function yet  Denies nausea  Walking a little  Denies CP, dyspnea    Gen:  Awake, Alert, NAD, comfortable and conversational  Blood pressure (!) 135/95, pulse 78, temperature 99.3  F (37.4  C), temperature source Oral, resp. rate 16, height 1.575 m (5' 2\"), weight 68.9 kg (152 lb), SpO2 97 %.  Resp - Non-labored  Abdomen - soft, appropriately tender, mildly distended. Dressings in place. Minimal drainage  Extremities - no lower extremity edema or tenderness with palpation    A/P Stage 4 high grade goblet cell carcinoid tumor of the appendix with history of surgical debulking and chemotherapy now presenting with abdominal pain with bowel obstruction and carcinomatosis s/p emergent exploratory laparotomy with lysis of adhesions, biopsy of peritoneal tumor and gastrostomy tube placement on 12/13/21.    - continue NPO except meds, sips and chips.  - minimal output from gastrostomy tube with absence of bowel function.   - will likely need nutritional support now (TPN) while bowel declares its functional capacity.   - encourage incentive spirometer use   - ambulate  - await pathology of peritoneal nodule.  - dispo: patient is hoping to attend a family gathering on Saturday.  Too many unknowns to predict hospital course at this time.  - Hospitalist and Oncology teams following     Rambo Magaña PA-C  Office: [phone]  Pager: [phone]    "

## 2021-12-15 ENCOUNTER — DOCUMENTATION ONLY (OUTPATIENT)
Dept: OTHER | Facility: CLINIC | Age: 41
End: 2021-12-15
Payer: COMMERCIAL

## 2021-12-15 LAB
ALBUMIN SERPL-MCNC: 2.5 G/DL (ref 3.4–5)
ALP SERPL-CCNC: 74 U/L (ref 40–150)
ALT SERPL W P-5'-P-CCNC: 21 U/L (ref 0–50)
ANION GAP SERPL CALCULATED.3IONS-SCNC: 4 MMOL/L (ref 3–14)
AST SERPL W P-5'-P-CCNC: 15 U/L (ref 0–45)
ATRIAL RATE - MUSE: 87 BPM
BASOPHILS # BLD AUTO: 0 10E3/UL (ref 0–0.2)
BASOPHILS NFR BLD AUTO: 0 %
BILIRUB SERPL-MCNC: 0.2 MG/DL (ref 0.2–1.3)
BUN SERPL-MCNC: 11 MG/DL (ref 7–30)
CALCIUM SERPL-MCNC: 8.6 MG/DL (ref 8.5–10.1)
CHLORIDE BLD-SCNC: 104 MMOL/L (ref 94–109)
CO2 SERPL-SCNC: 32 MMOL/L (ref 20–32)
CREAT SERPL-MCNC: 0.7 MG/DL (ref 0.52–1.04)
DIASTOLIC BLOOD PRESSURE - MUSE: NORMAL MMHG
EOSINOPHIL # BLD AUTO: 0.2 10E3/UL (ref 0–0.7)
EOSINOPHIL NFR BLD AUTO: 3 %
ERYTHROCYTE [DISTWIDTH] IN BLOOD BY AUTOMATED COUNT: 13.9 % (ref 10–15)
GFR SERPL CREATININE-BSD FRML MDRD: >90 ML/MIN/1.73M2
GLUCOSE BLD-MCNC: 111 MG/DL (ref 70–99)
GLUCOSE BLDC GLUCOMTR-MCNC: 107 MG/DL (ref 70–99)
GLUCOSE BLDC GLUCOMTR-MCNC: 110 MG/DL (ref 70–99)
GLUCOSE BLDC GLUCOMTR-MCNC: 113 MG/DL (ref 70–99)
GLUCOSE BLDC GLUCOMTR-MCNC: 115 MG/DL (ref 70–99)
GLUCOSE BLDC GLUCOMTR-MCNC: 134 MG/DL (ref 70–99)
GLUCOSE BLDC GLUCOMTR-MCNC: 99 MG/DL (ref 70–99)
HCT VFR BLD AUTO: 31.4 % (ref 35–47)
HGB BLD-MCNC: 10.3 G/DL (ref 11.7–15.7)
IMM GRANULOCYTES # BLD: 0 10E3/UL
IMM GRANULOCYTES NFR BLD: 0 %
INR PPP: 1.06 (ref 0.85–1.15)
INTERPRETATION ECG - MUSE: NORMAL
LYMPHOCYTES # BLD AUTO: 1.7 10E3/UL (ref 0.8–5.3)
LYMPHOCYTES NFR BLD AUTO: 32 %
MAGNESIUM SERPL-MCNC: 1.9 MG/DL (ref 1.6–2.3)
MCH RBC QN AUTO: 27 PG (ref 26.5–33)
MCHC RBC AUTO-ENTMCNC: 32.8 G/DL (ref 31.5–36.5)
MCV RBC AUTO: 82 FL (ref 78–100)
MONOCYTES # BLD AUTO: 0.3 10E3/UL (ref 0–1.3)
MONOCYTES NFR BLD AUTO: 6 %
NEUTROPHILS # BLD AUTO: 3.1 10E3/UL (ref 1.6–8.3)
NEUTROPHILS NFR BLD AUTO: 59 %
NRBC # BLD AUTO: 0 10E3/UL
NRBC BLD AUTO-RTO: 0 /100
P AXIS - MUSE: 60 DEGREES
PHOSPHATE SERPL-MCNC: 3 MG/DL (ref 2.5–4.5)
PLATELET # BLD AUTO: 183 10E3/UL (ref 150–450)
POTASSIUM BLD-SCNC: 3.4 MMOL/L (ref 3.4–5.3)
PR INTERVAL - MUSE: 162 MS
PROT SERPL-MCNC: 6 G/DL (ref 6.8–8.8)
QRS DURATION - MUSE: 102 MS
QT - MUSE: 364 MS
QTC - MUSE: 438 MS
R AXIS - MUSE: 37 DEGREES
RBC # BLD AUTO: 3.81 10E6/UL (ref 3.8–5.2)
SODIUM SERPL-SCNC: 140 MMOL/L (ref 133–144)
SYSTOLIC BLOOD PRESSURE - MUSE: NORMAL MMHG
T AXIS - MUSE: 62 DEGREES
VENTRICULAR RATE- MUSE: 87 BPM
WBC # BLD AUTO: 5.3 10E3/UL (ref 4–11)

## 2021-12-15 PROCEDURE — 85025 COMPLETE CBC W/AUTO DIFF WBC: CPT | Performed by: NURSE PRACTITIONER

## 2021-12-15 PROCEDURE — 120N000001 HC R&B MED SURG/OB

## 2021-12-15 PROCEDURE — 83735 ASSAY OF MAGNESIUM: CPT | Performed by: NURSE PRACTITIONER

## 2021-12-15 PROCEDURE — 250N000009 HC RX 250: Performed by: NURSE PRACTITIONER

## 2021-12-15 PROCEDURE — 85610 PROTHROMBIN TIME: CPT | Performed by: NURSE PRACTITIONER

## 2021-12-15 PROCEDURE — 80053 COMPREHEN METABOLIC PANEL: CPT | Performed by: NURSE PRACTITIONER

## 2021-12-15 PROCEDURE — 250N000009 HC RX 250: Performed by: PHYSICIAN ASSISTANT

## 2021-12-15 PROCEDURE — 250N000013 HC RX MED GY IP 250 OP 250 PS 637: Performed by: PHYSICIAN ASSISTANT

## 2021-12-15 PROCEDURE — 258N000003 HC RX IP 258 OP 636: Performed by: NURSE PRACTITIONER

## 2021-12-15 PROCEDURE — 84100 ASSAY OF PHOSPHORUS: CPT | Performed by: NURSE PRACTITIONER

## 2021-12-15 PROCEDURE — 250N000011 HC RX IP 250 OP 636: Performed by: PHYSICIAN ASSISTANT

## 2021-12-15 RX ORDER — SODIUM CHLORIDE, SODIUM LACTATE, POTASSIUM CHLORIDE, CALCIUM CHLORIDE 600; 310; 30; 20 MG/100ML; MG/100ML; MG/100ML; MG/100ML
INJECTION, SOLUTION INTRAVENOUS CONTINUOUS
Status: DISCONTINUED | OUTPATIENT
Start: 2021-12-15 | End: 2021-12-20 | Stop reason: HOSPADM

## 2021-12-15 RX ORDER — SODIUM CHLORIDE, SODIUM LACTATE, POTASSIUM CHLORIDE, CALCIUM CHLORIDE 600; 310; 30; 20 MG/100ML; MG/100ML; MG/100ML; MG/100ML
INJECTION, SOLUTION INTRAVENOUS CONTINUOUS
Status: DISCONTINUED | OUTPATIENT
Start: 2021-12-15 | End: 2021-12-15

## 2021-12-15 RX ADMIN — HYDROMORPHONE HYDROCHLORIDE 0.4 MG: 0.2 INJECTION, SOLUTION INTRAMUSCULAR; INTRAVENOUS; SUBCUTANEOUS at 10:53

## 2021-12-15 RX ADMIN — FAMOTIDINE 20 MG: 10 INJECTION, SOLUTION INTRAVENOUS at 08:22

## 2021-12-15 RX ADMIN — SODIUM CHLORIDE, POTASSIUM CHLORIDE, SODIUM LACTATE AND CALCIUM CHLORIDE: 600; 310; 30; 20 INJECTION, SOLUTION INTRAVENOUS at 20:13

## 2021-12-15 RX ADMIN — ONDANSETRON 4 MG: 2 INJECTION INTRAMUSCULAR; INTRAVENOUS at 11:16

## 2021-12-15 RX ADMIN — KETOROLAC TROMETHAMINE 15 MG: 15 INJECTION, SOLUTION INTRAMUSCULAR; INTRAVENOUS at 06:23

## 2021-12-15 RX ADMIN — SODIUM CHLORIDE, POTASSIUM CHLORIDE, SODIUM LACTATE AND CALCIUM CHLORIDE: 600; 310; 30; 20 INJECTION, SOLUTION INTRAVENOUS at 04:50

## 2021-12-15 RX ADMIN — ENOXAPARIN SODIUM 40 MG: 40 INJECTION SUBCUTANEOUS at 21:26

## 2021-12-15 RX ADMIN — HYDROMORPHONE HYDROCHLORIDE 0.4 MG: 0.2 INJECTION, SOLUTION INTRAMUSCULAR; INTRAVENOUS; SUBCUTANEOUS at 04:46

## 2021-12-15 RX ADMIN — PROCHLORPERAZINE EDISYLATE 10 MG: 5 INJECTION INTRAMUSCULAR; INTRAVENOUS at 14:32

## 2021-12-15 RX ADMIN — FAMOTIDINE 20 MG: 10 INJECTION, SOLUTION INTRAVENOUS at 21:26

## 2021-12-15 RX ADMIN — HYDROMORPHONE HYDROCHLORIDE 0.4 MG: 0.2 INJECTION, SOLUTION INTRAMUSCULAR; INTRAVENOUS; SUBCUTANEOUS at 14:33

## 2021-12-15 RX ADMIN — HYDROMORPHONE HYDROCHLORIDE 0.4 MG: 0.2 INJECTION, SOLUTION INTRAMUSCULAR; INTRAVENOUS; SUBCUTANEOUS at 08:22

## 2021-12-15 RX ADMIN — HYDROMORPHONE HYDROCHLORIDE 0.4 MG: 0.2 INJECTION, SOLUTION INTRAMUSCULAR; INTRAVENOUS; SUBCUTANEOUS at 00:29

## 2021-12-15 RX ADMIN — HYDROMORPHONE HYDROCHLORIDE 0.4 MG: 0.2 INJECTION, SOLUTION INTRAMUSCULAR; INTRAVENOUS; SUBCUTANEOUS at 18:48

## 2021-12-15 RX ADMIN — I.V. FAT EMULSION 250 ML: 20 EMULSION INTRAVENOUS at 21:25

## 2021-12-15 ASSESSMENT — ACTIVITIES OF DAILY LIVING (ADL)
ADLS_ACUITY_SCORE: 7

## 2021-12-15 ASSESSMENT — MIFFLIN-ST. JEOR: SCORE: 1322.71

## 2021-12-15 NOTE — PLAN OF CARE
Pt is A&O, up with 1 assist to bathoom. Voiding without difficulty. Gtube to gravity drain. Flush with 10 ml. Midline incision dressing CDI. AVSS on room air 98%. Lungs clear, hypoactive BS. Pain controlled with dilaudid and toradol. TPN and lipids infusing. BG checks q 4hrs no coverage needed.

## 2021-12-15 NOTE — PROGRESS NOTES
[facility] Progress Note     Primary Oncologist/Hematologist:  Dr. Mesa          Assessment and Plan:     1. 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  Recent CT imaging was highly concerning for recurrence of her cancer.  She has since developed symptoms of obstruction and has undergone EXPLORATORY LAPAROTOMY, LYSIS OF ADHESIONS, PLACEMENT OF G TUBE, AND PERITONEAL BIOPSY on 12/12/21. Surgical path of peritoneum bx shows metastatic carcinoma morphologically consistent with goblet cell adenocarcinoma.      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    We are investigating whether an assay that can determine if  the patient has a homologous recombination deficiency as it could be be very helpful in determining whether PARP inhibition could be used at some point in the future.     Plan to repeat genetic mutation studies on current biopsy.     Ferny previously has a prolonged response to FOLFOX following initial surgical resection. She did however develop a hypersensitivity reaction to the oxaliplatinum which required discontinuation of this medication. There  is a significant risk of this problem recurring if she were to receive the same medication again. Dr. eMsa is therefore recommending the  FOLFIRI regimen (irinotecan, bolus 5FU followed by infusional 5FU over 46 hours).  We reviewed the side effects typically seen with irinotecan including risk of low blood counts, diarrhea and hair loss,     Ferny was tentatively going to begin this regimen on 12/15/21.  With her surgery, we would anticipate initiating chemo in our clinic in about 2 weeks, after she has had time to recover.  Pt will need nutritional support  - TPN started 12/14/21.   -  Her chemotherapy will include a 46 hour continuous infusion which will not be compatible with TPN.  She may need a PICC line placed in a few  weeks when we plan to begin chemo     2.  GI bleeding.  Recent report of passing blood and mucous per rectum. Hgb is 12.4 compared to 12.7 in office on 12/8/21  - 12/10/21:  EGD by Dr. Kerr.  No mass lesion or obvious bleeding.   -  Hgb 10.3 today, down from 12.4 at admit. Pt denies further bleeding> Decline may be dilutional.      3. 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 Likely due to prior oxaliplatin Her symptoms have now largely resolved.     4.  Thyroid goiter. Recent CT scan shows the hyroid goiter appears unchanged.        Ericka Heredia, APRN, AOCNP  Nurse Practitioner  [facility]  [phone]            Interval History:     No bowel movement since admission.  Had abdominal cramping and pain after getting up to bathroom this morning, accompanied by vomiting.                Review of Systems:     The 5 point Review of Systems is negative other than noted in the HPI                Medications:   Scheduled Medications    acetaminophen  975 mg Oral Q8H     enoxaparin ANTICOAGULANT  40 mg Subcutaneous Q24H     famotidine  20 mg Intravenous BID     insulin aspart  1-6 Units Subcutaneous Q4H     lipids  250 mL Intravenous Once per day on Mon Tue Wed Thu Fri     sodium chloride (PF)  3 mL Intracatheter Q8H     PRN Medications  [START ON 12/16/2021] acetaminophen, bisacodyl, dextrose, glucose **OR** dextrose **OR** glucagon, diphenhydrAMINE **OR** diphenhydrAMINE, HYDROmorphone **OR** HYDROmorphone, ketorolac, lidocaine 4%, lidocaine viscous 2% 15 mL and maalox/mylanta w/ simethicone 15 mL (GI COCKTAIL), lidocaine (buffered or not buffered), metoprolol, naloxone **OR** naloxone **OR** naloxone **OR** naloxone, ondansetron **OR** ondansetron, oxyCODONE **OR** oxyCODONE, prochlorperazine **OR** prochlorperazine, sodium chloride (PF)               Physical Exam:   Vitals were reviewed  Blood pressure (!) 144/92, pulse 115, temperature 98  F (36.7  C),  "temperature source Oral, resp. rate 18, height 1.575 m (5' 2\"), weight 69.4 kg (153 lb 1.6 oz), SpO2 96 %.  Wt Readings from Last 4 Encounters:   12/15/21 69.4 kg (153 lb 1.6 oz)   04/15/21 66.7 kg (147 lb)   02/13/21 64.4 kg (142 lb)   08/05/20 72.3 kg (159 lb 6.3 oz)       I/O last 3 completed shifts:  In: 822.31 [I.V.:87.5; NG/GT:30]  Out: 425 [Emesis/NG output:425]                Data:   All laboratory data and imaging studies reviewed.    CMP  Recent Labs   Lab 12/15/21  0831 12/15/21  0624 12/15/21  0447 12/15/21  0006 12/14/21  1613 12/14/21  0631 12/13/21  0640 12/13/21  0018 12/12/21  2125   NA  --  140  --   --  142  --   --   --  143   POTASSIUM  --  3.4  --   --  3.5  --   --   --  3.4   CHLORIDE  --  104  --   --  108  --   --   --  109   CO2  --  32  --   --  31  --   --   --  29   ANIONGAP  --  4  --   --  3  --   --   --  5   GLC 99 111* 113* 115* 84   < >  --    < > 91   BUN  --  11  --   --  10  --   --   --  9   CR  --  0.70  --   --  0.76  --  0.76  --  0.78   GFRESTIMATED  --  >90  --   --  >90  --  >90  --  >90   IRON  --  8.6  --   --  8.5  --   --   --  9.2   MAG  --  1.9  --   --  1.6  --   --   --   --    PHOS  --  3.0  --   --  2.0*  --   --   --   --    PROTTOTAL  --  6.0*  --   --  5.8*  --   --   --  7.0   ALBUMIN  --  2.5*  --   --  2.5*  --   --   --  3.4   BILITOTAL  --  0.2  --   --  0.5  --   --   --  0.5   ALKPHOS  --  74  --   --  75  --   --   --  98   AST  --  15  --   --  21  --   --   --  24   ALT  --  21  --   --  20  --   --   --  26    < > = values in this interval not displayed.     CBC  Recent Labs   Lab 12/15/21  0625 12/12/21  2125   WBC 5.3 6.2   RBC 3.81 4.72   HGB 10.3* 12.4   HCT 31.4* 38.3   MCV 82 81   MCH 27.0 26.3*   MCHC 32.8 32.4   RDW 13.9 14.0    248     INR  Recent Labs   Lab 12/15/21  0624 12/14/21  1613   INR 1.06 1.13           BARBARA Carpenter  Nurse Practitioner  [facility]  [phone]      "

## 2021-12-15 NOTE — PLAN OF CARE
Pt was A & O x 4. Lungs sound clear, bowel sounds hypoactive, cms intact Up with 1 assist to BR. Cms intact. Voiding adequately. NPO. On TPN. IV fluid infusing. Dressing was changed. G-tube to gravity. Received IV dilaudid for pain; compazine and zofran for nausea. Port-a-cath patent and infusing. Will continue to monitor.

## 2021-12-15 NOTE — PROGRESS NOTES
Nursing [id number]: A&OX4. Flat affect. Pain well managed with IV Toradol. Agreed then refused PRN oral agents. TPN and Lipids started. Tolerating well so far. No fever. VSS.

## 2021-12-15 NOTE — PROGRESS NOTES
Surgery  Pt about the same.  Plan to send surgical specimen for additional genetic studies  Continue cares, TPN  G-tube to gravity  Osmany Ibrahim MD  General Surgery, Office [phone]

## 2021-12-15 NOTE — PLAN OF CARE
A&Ox4. Flat affect. VSS on RA. Pain controlled with PRN IV dilaudid and IV Toradol x 1. SBA to BR. Voiding adequately. Negative flatus. Some belching. No BM. BS hypoactive. NPO except ice, sips of clears and meds. Denies nausea at this time. Venting G tube to gravity. Orders for flushes q shift and gets nauseous with flushes. R chest port infusing LR at 125 mL/hr. Midline incision with dried drainage. CMS intact. Phos 2.0 this shift. Ordered replacement, but waiting for pharmacy to deliver. Plan is to start TPN this evening. Hem/on, surgery and hospitalist following. Discharging pending.

## 2021-12-15 NOTE — PROGRESS NOTES
SP[facility] Progress Note  [facility]    Follow-Up for HCD.    I met with Ferny and assisted her filling out of an [employer] form.    I facilitated the notarization of her signature with [employer].    I invited Ferny to reach out to  as desired; SH remains available.    Whitley Felder  Associate

## 2021-12-16 LAB
ANION GAP SERPL CALCULATED.3IONS-SCNC: 5 MMOL/L (ref 3–14)
BUN SERPL-MCNC: 12 MG/DL (ref 7–30)
CALCIUM SERPL-MCNC: 9.1 MG/DL (ref 8.5–10.1)
CHLORIDE BLD-SCNC: 106 MMOL/L (ref 94–109)
CO2 SERPL-SCNC: 29 MMOL/L (ref 20–32)
CREAT SERPL-MCNC: 0.68 MG/DL (ref 0.52–1.04)
GFR SERPL CREATININE-BSD FRML MDRD: >90 ML/MIN/1.73M2
GLUCOSE BLD-MCNC: 112 MG/DL (ref 70–99)
GLUCOSE BLDC GLUCOMTR-MCNC: 108 MG/DL (ref 70–99)
GLUCOSE BLDC GLUCOMTR-MCNC: 108 MG/DL (ref 70–99)
GLUCOSE BLDC GLUCOMTR-MCNC: 115 MG/DL (ref 70–99)
GLUCOSE BLDC GLUCOMTR-MCNC: 117 MG/DL (ref 70–99)
GLUCOSE BLDC GLUCOMTR-MCNC: 125 MG/DL (ref 70–99)
GLUCOSE BLDC GLUCOMTR-MCNC: 127 MG/DL (ref 70–99)
GLUCOSE BLDC GLUCOMTR-MCNC: 142 MG/DL (ref 70–99)
MAGNESIUM SERPL-MCNC: 1.7 MG/DL (ref 1.6–2.3)
PHOSPHATE SERPL-MCNC: 3.4 MG/DL (ref 2.5–4.5)
PLATELET # BLD AUTO: 206 10E3/UL (ref 150–450)
POTASSIUM BLD-SCNC: 3.3 MMOL/L (ref 3.4–5.3)
POTASSIUM BLD-SCNC: 3.5 MMOL/L (ref 3.4–5.3)
SODIUM SERPL-SCNC: 140 MMOL/L (ref 133–144)

## 2021-12-16 PROCEDURE — 250N000009 HC RX 250: Performed by: PHYSICIAN ASSISTANT

## 2021-12-16 PROCEDURE — 84132 ASSAY OF SERUM POTASSIUM: CPT | Performed by: NURSE PRACTITIONER

## 2021-12-16 PROCEDURE — 84100 ASSAY OF PHOSPHORUS: CPT | Performed by: INTERNAL MEDICINE

## 2021-12-16 PROCEDURE — 250N000011 HC RX IP 250 OP 636: Performed by: INTERNAL MEDICINE

## 2021-12-16 PROCEDURE — 80048 BASIC METABOLIC PNL TOTAL CA: CPT | Performed by: NURSE PRACTITIONER

## 2021-12-16 PROCEDURE — 258N000003 HC RX IP 258 OP 636: Performed by: NURSE PRACTITIONER

## 2021-12-16 PROCEDURE — 83735 ASSAY OF MAGNESIUM: CPT | Performed by: NURSE PRACTITIONER

## 2021-12-16 PROCEDURE — 250N000009 HC RX 250: Performed by: NURSE PRACTITIONER

## 2021-12-16 PROCEDURE — 250N000011 HC RX IP 250 OP 636: Performed by: PHYSICIAN ASSISTANT

## 2021-12-16 PROCEDURE — 120N000001 HC R&B MED SURG/OB

## 2021-12-16 PROCEDURE — 85049 AUTOMATED PLATELET COUNT: CPT | Performed by: PHYSICIAN ASSISTANT

## 2021-12-16 RX ORDER — HYDRALAZINE HYDROCHLORIDE 20 MG/ML
10 INJECTION INTRAMUSCULAR; INTRAVENOUS EVERY 4 HOURS PRN
Status: DISCONTINUED | OUTPATIENT
Start: 2021-12-16 | End: 2021-12-20 | Stop reason: HOSPADM

## 2021-12-16 RX ADMIN — HYDROMORPHONE HYDROCHLORIDE 0.4 MG: 0.2 INJECTION, SOLUTION INTRAMUSCULAR; INTRAVENOUS; SUBCUTANEOUS at 18:48

## 2021-12-16 RX ADMIN — FAMOTIDINE 20 MG: 10 INJECTION, SOLUTION INTRAVENOUS at 21:16

## 2021-12-16 RX ADMIN — SODIUM CHLORIDE, POTASSIUM CHLORIDE, SODIUM LACTATE AND CALCIUM CHLORIDE: 600; 310; 30; 20 INJECTION, SOLUTION INTRAVENOUS at 09:47

## 2021-12-16 RX ADMIN — HYDRALAZINE HYDROCHLORIDE 10 MG: 20 INJECTION INTRAMUSCULAR; INTRAVENOUS at 22:44

## 2021-12-16 RX ADMIN — ONDANSETRON 4 MG: 2 INJECTION INTRAMUSCULAR; INTRAVENOUS at 18:48

## 2021-12-16 RX ADMIN — FAMOTIDINE 20 MG: 10 INJECTION, SOLUTION INTRAVENOUS at 08:13

## 2021-12-16 RX ADMIN — ASCORBIC ACID, VITAMIN A PALMITATE, CHOLECALCIFEROL, THIAMINE HYDROCHLORIDE, RIBOFLAVIN-5 PHOSPHATE SODIUM, PYRIDOXINE HYDROCHLORIDE, NIACINAMIDE, DEXPANTHENOL, ALPHA-TOCOPHEROL ACETATE, VITAMIN K1, FOLIC ACID, BIOTIN, CYANOCOBALAMIN: 200; 3300; 200; 6; 3.6; 6; 40; 15; 10; 150; 600; 60; 5 INJECTION, SOLUTION INTRAVENOUS at 22:16

## 2021-12-16 RX ADMIN — ASCORBIC ACID, VITAMIN A PALMITATE, CHOLECALCIFEROL, THIAMINE HYDROCHLORIDE, RIBOFLAVIN-5 PHOSPHATE SODIUM, PYRIDOXINE HYDROCHLORIDE, NIACINAMIDE, DEXPANTHENOL, ALPHA-TOCOPHEROL ACETATE, VITAMIN K1, FOLIC ACID, BIOTIN, CYANOCOBALAMIN: 200; 3300; 200; 6; 3.6; 6; 40; 15; 10; 150; 600; 60; 5 INJECTION, SOLUTION INTRAVENOUS at 08:00

## 2021-12-16 RX ADMIN — METOPROLOL TARTRATE 5 MG: 5 INJECTION INTRAVENOUS at 01:48

## 2021-12-16 RX ADMIN — HYDRALAZINE HYDROCHLORIDE 10 MG: 20 INJECTION INTRAMUSCULAR; INTRAVENOUS at 03:21

## 2021-12-16 RX ADMIN — HYDRALAZINE HYDROCHLORIDE 10 MG: 20 INJECTION INTRAMUSCULAR; INTRAVENOUS at 16:07

## 2021-12-16 RX ADMIN — KETOROLAC TROMETHAMINE 15 MG: 15 INJECTION, SOLUTION INTRAMUSCULAR; INTRAVENOUS at 08:13

## 2021-12-16 RX ADMIN — ONDANSETRON 4 MG: 2 INJECTION INTRAMUSCULAR; INTRAVENOUS at 08:13

## 2021-12-16 RX ADMIN — PROCHLORPERAZINE EDISYLATE 10 MG: 5 INJECTION INTRAMUSCULAR; INTRAVENOUS at 10:44

## 2021-12-16 RX ADMIN — I.V. FAT EMULSION 250 ML: 20 EMULSION INTRAVENOUS at 21:15

## 2021-12-16 RX ADMIN — ENOXAPARIN SODIUM 40 MG: 40 INJECTION SUBCUTANEOUS at 21:15

## 2021-12-16 RX ADMIN — HYDROMORPHONE HYDROCHLORIDE 0.2 MG: 0.2 INJECTION, SOLUTION INTRAMUSCULAR; INTRAVENOUS; SUBCUTANEOUS at 11:28

## 2021-12-16 RX ADMIN — HYDROMORPHONE HYDROCHLORIDE 0.4 MG: 0.2 INJECTION, SOLUTION INTRAMUSCULAR; INTRAVENOUS; SUBCUTANEOUS at 01:30

## 2021-12-16 ASSESSMENT — ACTIVITIES OF DAILY LIVING (ADL)
ADLS_ACUITY_SCORE: 7

## 2021-12-16 NOTE — PLAN OF CARE
Cognitive Concerns/ Orientation : A&Ox4  BEHAVIOR & AGGRESSION TOOL COLOR: green   CIWA SCORE: NA   ABNL VS/O2: VSS, temp of 99.6  MOBILITY: assist of 1  PAIN MANAGMENT: toradol and dilaudid given   DIET: NPO  BOWEL/BLADDER: continent, uses bedpan at times  ABNL LAB/BG:  K 3.3, recheck was 3.7. bgm 142-declined insulin  DRAIN/DEVICES: g tube-gravity drainage- 150 ml output of greenish drainage  TELEMETRY RHYTHM:NA  SKIN: WDL  TESTS/PROCEDURES: none    Receiving TPN and LR. Zofran and compazine given for nausea. No emesis. Mid abd incision dressing c/d/I. Hypoactive BS. No BM. Slept most of the afternoon.

## 2021-12-16 NOTE — PROGRESS NOTES
[facility]    Received request for benefit check for potential home tube feed or TPN. Pt has 100% coverage for both TF and TPN through her [employer] PMAP plan.    Thank you    Pamela Hagan RN  [facility] Liaison  [phone] (Mon thru Fri 8am - 5pm)  [phone] Office

## 2021-12-16 NOTE — CONSULTS
Care Management Initial Consult    General Information  Assessment completed with: Ferny Murphy  Type of CM/SW Visit: Initial Assessment    Primary Care Provider verified and updated as needed: Yes   Readmission within the last 30 days:           Advance Care Planning: Advance Care Planning Reviewed: questions answered          Communication Assessment  Patient's communication style: spoken language (English or Bilingual)    Hearing Difficulty or Deaf: no   Wear Glasses or Blind: no    Cognitive  Cognitive/Neuro/Behavioral: WDL  Level of Consciousness: alert  Arousal Level: opens eyes spontaneously  Orientation: oriented x 4  Mood/Behavior: withdrawn  Best Language: 0 - No aphasia  Speech: clear,spontaneous    Living Environment:   People in home: child(peyton), adult,child(peyton), dependent     Current living Arrangements: house      Able to return to prior arrangements:         Family/Social Support:  Care provided by: self,child(peyton)  Provides care for: child(peyton)  Marital Status: Single  Children,Parent(s)          Description of Support System: Supportive         Current Resources:   Patient receiving home care services: No     Community Resources: None  Equipment currently used at home: none  Supplies currently used at home: None    Employment/Financial:  Employment Status: disabled        Financial Concerns: No concerns identified           Lifestyle & Psychosocial Needs:  Social Determinants of Health     Tobacco Use: Low Risk      Smoking Tobacco Use: Never Smoker     Smokeless Tobacco Use: Never Used   Alcohol Use: Not on file   Financial Resource Strain: Not on file   Food Insecurity: Not on file   Transportation Needs: Not on file   Physical Activity: Not on file   Stress: Not on file   Social Connections: Not on file   Intimate Partner Violence: Not on file   Depression: Not on file   Housing Stability: Not on file       Functional Status:  Prior to admission patient needed assistance:              Mental  Health Status:  Mental Health Status: No Current Concerns       Chemical Dependency Status:                Values/Beliefs:  Spiritual, Cultural Beliefs, Jehovah's witness Practices, Values that affect care: no               Additional Information:  Patient is identifed with needing Home Infusion for TPN. She will also need Home Care. Met with patient. Unsure of discharge date but will follow for discharge planning. [facility] is following for discharge to home with [facility].    Kate Mac RN

## 2021-12-16 NOTE — PROVIDER NOTIFICATION
.MD Notification    Notified Person: MD    Notified Person Name: Dr. Nowak    Notification Date/Time: 0300/ 12-16-21    Notification Interaction: Text Page    Purpose of Notification: DBP still elevated even after giving PRN Lopressor. 154/113 on right arm & 152/103 on left arm.     Orders Received: Hydralazine IV PRN    Comments: BP now 147/95

## 2021-12-16 NOTE — PLAN OF CARE
Orientation: A & O x 4.  VS/ O2/ IV: VSS on RA. Port R chest infusing TPN and LR.   GI/: -BS. Passing gas, no BM. Midline abdominal incision c/d/i  Mobility: A1 to BR.  Pain Management: Dilaudid IV x 1 for abdominal pain.   Diet: NPO   Bowel/ Bladder: Continent of bowel and bladder.   Labs/ BG:   Drains/Devices: G tube draining to gravity, green output.   Skin: C/D/I   Discharge/ Plan: Pain management. Gen surg and Hem/Onc following

## 2021-12-16 NOTE — PROGRESS NOTES
[facility] Progress Note     Primary Oncologist/Hematologist:  Dr. Mesa          Assessment and Plan:     1. 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  Recent CT imaging was highly concerning for recurrence of her cancer.  She has since developed symptoms of obstruction and has undergone EXPLORATORY LAPAROTOMY, LYSIS OF ADHESIONS, PLACEMENT OF G TUBE, AND PERITONEAL BIOPSY on 12/12/21. Surgical path of peritoneum bx shows metastatic carcinoma morphologically consistent with goblet cell adenocarcinoma.      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    Dr. Mesa is investigating whether an assay that can determine if  the patient has a homologous recombination deficiency as it could be be very helpful in determining whether PARP inhibition could be used at some point in the future.     Plan to repeat genetic mutation studies on current biopsy.     Ferny previously has a prolonged response to FOLFOX following initial surgical resection. She did however develop a hypersensitivity reaction to the oxaliplatinum which required discontinuation of this medication. There  is a significant risk of this problem recurring if she were to receive the same medication again. Dr. Mesa is therefore recommending the  FOLFIRI regimen (irinotecan, bolus 5FU followed by infusional 5FU over 46 hours).  We reviewed the side effects typically seen with irinotecan including risk of low blood counts, diarrhea and hair loss,     Ferny was supposed to start this regimen on 12/15/21.  But with her surgery, we would anticipate initiating chemo in our clinic in about 2 weeks, after she has had time to recover.  Pt will need nutritional support  - TPN started 12/14/21.   -  Her chemotherapy will include a 46 hour continuous infusion which will not be compatible with TPN.  She may need a PICC line placed in a few  "weeks when we plan to begin chemo     2.  GI bleeding.  Recent report of passing blood and mucous per rectum. Hgb is 12.4 compared to 12.7 in office on 12/8/21  - 12/10/21:  EGD by Dr. Kerr.  No mass lesion or obvious bleeding.   -  Hgb 10.3 today, down from 12.4 at admit. Pt denies further bleeding> Decline may be dilutional.      3. 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 Likely due to prior oxaliplatin Her symptoms have now largely resolved.     4.  Thyroid goiter. Recent CT scan shows the hyroid goiter appears unchanged.                  Interval History:     Patient is comfortable overall with reasonable pain control, she has no bowel movement or passing flatus yet, she does have burping.              Review of Systems:     The 5 point Review of Systems is negative other than noted in the HPI                Medications:   Scheduled Medications    enoxaparin ANTICOAGULANT  40 mg Subcutaneous Q24H     famotidine  20 mg Intravenous BID     insulin aspart  1-6 Units Subcutaneous Q4H     lipids  250 mL Intravenous Once per day on Mon Tue Wed Thu Fri     sodium chloride (PF)  3 mL Intracatheter Q8H     PRN Medications  acetaminophen, bisacodyl, dextrose, glucose **OR** dextrose **OR** glucagon, diphenhydrAMINE **OR** diphenhydrAMINE, hydrALAZINE, HYDROmorphone **OR** HYDROmorphone, ketorolac, lidocaine 4%, lidocaine viscous 2% 15 mL and maalox/mylanta w/ simethicone 15 mL (GI COCKTAIL), lidocaine (buffered or not buffered), metoprolol, naloxone **OR** naloxone **OR** naloxone **OR** naloxone, ondansetron **OR** ondansetron, oxyCODONE **OR** oxyCODONE, prochlorperazine **OR** prochlorperazine, sodium chloride (PF)               Physical Exam:   Vitals were reviewed  Blood pressure (!) 151/98, pulse 94, temperature 98.3  F (36.8  C), temperature source Oral, resp. rate 14, height 1.575 m (5' 2\"), weight 69.4 kg (153 lb 1.6 oz), SpO2 99 %.  Wt Readings from Last 4 " Encounters:   12/15/21 69.4 kg (153 lb 1.6 oz)   04/15/21 66.7 kg (147 lb)   02/13/21 64.4 kg (142 lb)   08/05/20 72.3 kg (159 lb 6.3 oz)       I/O last 3 completed shifts:  In: 10 [NG/GT:10]  Out: 2475 [Urine:2200; Emesis/NG output:275]    Pleasant in no acute distress.  HEENT unremarkable.  Respiratory in no distress.  Extremities no edema.  Neurological: Nonfocal.  No facial asymmetry.  Skin: No petechia ecchymosis.          Data:   All laboratory data and imaging studies reviewed.    CMP  Recent Labs   Lab 12/16/21  1559 12/16/21  1306 12/16/21  1037 12/16/21  0909 12/16/21  0633 12/16/21  0552 12/15/21  0831 12/15/21  0624 12/15/21  0006 12/14/21  1613 12/14/21  0631 12/13/21  0640 12/13/21  0018 12/12/21  2125   NA  --   --   --   --   --  140  --  140  --  142  --   --   --  143   POTASSIUM  --   --  3.5  --   --  3.3*  --  3.4  --  3.5  --   --   --  3.4   CHLORIDE  --   --   --   --   --  106  --  104  --  108  --   --   --  109   CO2  --   --   --   --   --  29  --  32  --  31  --   --   --  29   ANIONGAP  --   --   --   --   --  5  --  4  --  3  --   --   --  5   * 125*  --  142* 117* 112*   < > 111*   < > 84   < >  --    < > 91   BUN  --   --   --   --   --  12  --  11  --  10  --   --   --  9   CR  --   --   --   --   --  0.68  --  0.70  --  0.76  --  0.76  --  0.78   GFRESTIMATED  --   --   --   --   --  >90  --  >90  --  >90  --  >90  --  >90   IRON  --   --   --   --   --  9.1  --  8.6  --  8.5  --   --   --  9.2   MAG  --   --   --   --   --  1.7  --  1.9  --  1.6  --   --   --   --    PHOS  --   --   --   --   --  3.4  --  3.0  --  2.0*  --   --   --   --    PROTTOTAL  --   --   --   --   --   --   --  6.0*  --  5.8*  --   --   --  7.0   ALBUMIN  --   --   --   --   --   --   --  2.5*  --  2.5*  --   --   --  3.4   BILITOTAL  --   --   --   --   --   --   --  0.2  --  0.5  --   --   --  0.5   ALKPHOS  --   --   --   --   --   --   --  74  --  75  --   --   --  98   AST  --   --   --   --   --    --   --  15  --  21  --   --   --  24   ALT  --   --   --   --   --   --   --  21  --  20  --   --   --  26    < > = values in this interval not displayed.     CBC  Recent Labs   Lab 12/16/21  0552 12/15/21  0625 12/12/21 2125   WBC  --  5.3 6.2   RBC  --  3.81 4.72   HGB  --  10.3* 12.4   HCT  --  31.4* 38.3   MCV  --  82 81   MCH  --  27.0 26.3*   MCHC  --  32.8 32.4   RDW  --  13.9 14.0    183 248     INR  Recent Labs   Lab 12/15/21  0624 12/14/21  1613   INR 1.06 1.13

## 2021-12-16 NOTE — PLAN OF CARE
Patient alert & oriented x4. Patients vitals were stable except for BP and temp. Patient had elevated systolic & diastolic pressure. IV Lopressor given to little to no effect. Provider notified. Hydralazine ordered and given. BP now 147/95. Patient had one low grade temp of 99.6. Lopressor & Hydralazine available for SBP over 150. Patient given PRN pain medications per MAR. Dilaudid given. TPN, LR, and Lipids are currently running. Patient voiding. G Tube draining to gravity. 125 ml output from G Tube. Patient slept comfortably most of the night. Will continue to monitor.

## 2021-12-16 NOTE — PROVIDER NOTIFICATION
"Hospitalist Cross Cover  12/16/2021    Notified of elevated diastolic blood pressure. Chart reviewed. Patient on surgery service for treatment of metastatic appendiceal cancer. Noted to have elevated blood pressure. 154/113, 152/103.     BP (!) 152/103 (BP Location: Left arm)   Pulse 88   Temp 99.6  F (37.6  C) (Oral)   Resp 18   Ht 1.575 m (5' 2\")   Wt 69.4 kg (153 lb 1.6 oz)   SpO2 97%   BMI 28.00 kg/m      Plan: No need to treat diastolic BP.   Added hydralazine 10 mg IV q 4 hours as needed for systolic BP >150.    Hospitalist service signed off on 12/14/21. Page if needed again.     Richa Nowak MD        "

## 2021-12-16 NOTE — PROGRESS NOTES
"Surgery    Patient sleeping comfortably.  Did not wake.  Mild hypertension over night    Vital signs:  Temp: 99.1  F (37.3  C) Temp src: Oral BP: (!) 142/93 Pulse: 88   Resp: 16 SpO2: 98 % O2 Device: None (Room air) Oxygen Delivery: 1 LPM Height: 157.5 cm (5' 2\") Weight: 69.4 kg (153 lb 1.6 oz)  Estimated body mass index is 28 kg/m  as calculated from the following:    Height as of this encounter: 1.575 m (5' 2\").    Weight as of this encounter: 69.4 kg (153 lb 1.6 oz).    Labs WNL  G-tube to gravity. Output 200cc yesterday.  Receiving nutrition via TPN.  No further surgery input/intervention anticipated  Appreciate guidance from Heme/Onc team regarding dispo.     Rambo Magaña PA-C    "

## 2021-12-17 LAB
GLUCOSE BLDC GLUCOMTR-MCNC: 105 MG/DL (ref 70–99)
GLUCOSE BLDC GLUCOMTR-MCNC: 112 MG/DL (ref 70–99)
GLUCOSE BLDC GLUCOMTR-MCNC: 113 MG/DL (ref 70–99)
GLUCOSE BLDC GLUCOMTR-MCNC: 123 MG/DL (ref 70–99)
GLUCOSE BLDC GLUCOMTR-MCNC: 148 MG/DL (ref 70–99)

## 2021-12-17 PROCEDURE — 120N000001 HC R&B MED SURG/OB

## 2021-12-17 PROCEDURE — 258N000003 HC RX IP 258 OP 636: Performed by: NURSE PRACTITIONER

## 2021-12-17 PROCEDURE — 250N000009 HC RX 250: Performed by: PHYSICIAN ASSISTANT

## 2021-12-17 PROCEDURE — 250N000011 HC RX IP 250 OP 636: Performed by: PHYSICIAN ASSISTANT

## 2021-12-17 PROCEDURE — 250N000009 HC RX 250: Performed by: NURSE PRACTITIONER

## 2021-12-17 RX ADMIN — ONDANSETRON 4 MG: 2 INJECTION INTRAMUSCULAR; INTRAVENOUS at 10:22

## 2021-12-17 RX ADMIN — SODIUM CHLORIDE, POTASSIUM CHLORIDE, SODIUM LACTATE AND CALCIUM CHLORIDE: 600; 310; 30; 20 INJECTION, SOLUTION INTRAVENOUS at 02:40

## 2021-12-17 RX ADMIN — HYDROMORPHONE HYDROCHLORIDE 0.2 MG: 0.2 INJECTION, SOLUTION INTRAMUSCULAR; INTRAVENOUS; SUBCUTANEOUS at 00:14

## 2021-12-17 RX ADMIN — HYDROMORPHONE HYDROCHLORIDE 0.2 MG: 0.2 INJECTION, SOLUTION INTRAMUSCULAR; INTRAVENOUS; SUBCUTANEOUS at 06:59

## 2021-12-17 RX ADMIN — ASCORBIC ACID, VITAMIN A PALMITATE, CHOLECALCIFEROL, THIAMINE HYDROCHLORIDE, RIBOFLAVIN-5 PHOSPHATE SODIUM, PYRIDOXINE HYDROCHLORIDE, NIACINAMIDE, DEXPANTHENOL, ALPHA-TOCOPHEROL ACETATE, VITAMIN K1, FOLIC ACID, BIOTIN, CYANOCOBALAMIN: 200; 3300; 200; 6; 3.6; 6; 40; 15; 10; 150; 600; 60; 5 INJECTION, SOLUTION INTRAVENOUS at 20:52

## 2021-12-17 RX ADMIN — SODIUM CHLORIDE, POTASSIUM CHLORIDE, SODIUM LACTATE AND CALCIUM CHLORIDE: 600; 310; 30; 20 INJECTION, SOLUTION INTRAVENOUS at 19:37

## 2021-12-17 RX ADMIN — FAMOTIDINE 20 MG: 10 INJECTION, SOLUTION INTRAVENOUS at 20:43

## 2021-12-17 RX ADMIN — FAMOTIDINE 20 MG: 10 INJECTION, SOLUTION INTRAVENOUS at 10:22

## 2021-12-17 RX ADMIN — HYDROMORPHONE HYDROCHLORIDE 0.2 MG: 0.2 INJECTION, SOLUTION INTRAMUSCULAR; INTRAVENOUS; SUBCUTANEOUS at 22:35

## 2021-12-17 RX ADMIN — KETOROLAC TROMETHAMINE 15 MG: 15 INJECTION, SOLUTION INTRAMUSCULAR; INTRAVENOUS at 11:58

## 2021-12-17 RX ADMIN — PROCHLORPERAZINE EDISYLATE 10 MG: 5 INJECTION INTRAMUSCULAR; INTRAVENOUS at 00:11

## 2021-12-17 RX ADMIN — I.V. FAT EMULSION 250 ML: 20 EMULSION INTRAVENOUS at 20:48

## 2021-12-17 RX ADMIN — ENOXAPARIN SODIUM 40 MG: 40 INJECTION SUBCUTANEOUS at 20:43

## 2021-12-17 ASSESSMENT — ACTIVITIES OF DAILY LIVING (ADL)
ADLS_ACUITY_SCORE: 7
ADLS_ACUITY_SCORE: 9
ADLS_ACUITY_SCORE: 7
ADLS_ACUITY_SCORE: 9
ADLS_ACUITY_SCORE: 7
ADLS_ACUITY_SCORE: 9
ADLS_ACUITY_SCORE: 7
ADLS_ACUITY_SCORE: 9
ADLS_ACUITY_SCORE: 7
ADLS_ACUITY_SCORE: 7

## 2021-12-17 NOTE — CONSULTS
Care Management Follow Up    Length of Stay (days): 5    Expected Discharge Date: 12/17/2021     Concerns to be Addressed: discharge planning     Patient plan of care discussed at interdisciplinary rounds: Yes    Anticipated Discharge Disposition: Home Care,Home Infusion  Disposition Comments: Case management is following for discharge planning and home infusion plans to meet with patient to discuss benefits and plan of care for home.  Anticipated Discharge Services:    Anticipated Discharge DME:      Patient/family educated on Medicare website which has current facility and service quality ratings:    Education Provided on the Discharge Plan:    Patient/Family in Agreement with the Plan: yes    Referrals Placed by CM/SW: Home Infusion,Homecare  Private pay costs discussed: Not applicable    Additional Information:  Case management is following for discharge planning and home infusion plans to meet with patient to discuss benefits and plan of care for home.    1404 discussed POC with surgery provider clarifying home infusion needs. Available services for patient at her home are on Monday, December 20th per Home infusion notes, would be the earliest discharge day in regards to discharge planning.     Melia Coon RN   [facility]   Phone [phone]

## 2021-12-17 NOTE — PROGRESS NOTES
A/Ox4. VSS ex HTN. BP was 154/96, Hydralazine given and BP now 143/96. Ax1 to BR, voiding. Denies pain, N/V/D this shift. BS are hypoactive. TPN running at 70 mL/hr and LR running at 55 mL/hr. G tube draining w/ gravity w/ green drainage. BG Q4hrs, last BG was 108.

## 2021-12-17 NOTE — PROGRESS NOTES
"Surgery    No new complaints.   Walking some  No nausea now but had an episode of nausea resulting in a small mucus emesis earlier.   Increased G-tube output last night (650cc)   Pain controlled.   No bowel function yet    Gen:  Awake, Alert, NAD  Blood pressure (!) 136/91, pulse 92, temperature 99.2  F (37.3  C), temperature source Oral, resp. rate 16, height 1.575 m (5' 2\"), weight 69.4 kg (153 lb 1.6 oz), SpO2 98 %.  Resp - clear to ascultation.    Cardiac - Regular rate & rhythm without murmur  Abdomen - hypoactive bowel sounds, soft, non tender, non distended. Incisions healing appropriately without erythema or purulent drainage. Staples in place.   Extremities - no lower extremity edema or tenderness    A/P Stage 4 high grade goblet cell carcinoid tumor of the appendix with history of surgical debulking and chemotherapy now presenting with abdominal pain with bowel obstruction and carcinomatosis s/p emergent exploratory laparotomy with lysis of adhesions, biopsy of peritoneal tumor and gastrostomy tube placement on 12/13/21.    - Add APAP suppository  - BP elevated - Hydralazine/metoprolol given >8 hrs ago and BP has remained within an acceptable range.    - Requested /sw assistance for outpatient TPN.   - Discussed Gastrostomy tube cares.   - Working toward discharge as soon as its safe for patient - BP, G-tube, F/E/N, ambulation, pain needs to be managed.    Rambo Magaña PA-C    Addendum:   Due to home infusion staffing in patient's service area, earliest discharge will be 12/20.  Continue to monitor, provide supportive care and identify any additional needs throughout the weekend.    Rambo Magaña PA-C    "

## 2021-12-17 NOTE — PROGRESS NOTES
[facility]     Received request for benefit check for potential home tube feed or TPN. Pt has 100% coverage for both TF and TPN through her [employer] PMAP plan.    I met with Maria Astephenelio to introduce home infusion and review benefits. Jefe would like to use [facility] for home TPN needs. Only two home care agencies service her area ([place], [place]) and [facility] (P: [phone]) was the only home care agency that accepted for care. They do not have staffing on the weekends and will be closed on Dec 24th for Delfino Ortega. Monday 12/20 would be the earliest they would be able to admit Jefe for care. I reviewed all of this with Juan Pablokevin and she understood the staffing limitations.     Thank you     Pamela Hagan RN  [facility] Liaison  [phone] (Mon thru Fri 8am - 5pm)  [phone] Office

## 2021-12-17 NOTE — PROGRESS NOTES
Progress Note     Primary Oncologist/Hematologist:  Dr. Mesa          Assessment and Plan:New actions/orders today (12/17/2021) are underlined.     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   - She has since developed symptoms of obstruction and has undergone EXPLORATORY LAPAROTOMY, LYSIS OF ADHESIONS, PLACEMENT OF G TUBE, AND PERITONEAL BIOPSY on 12/12/21.   - Surgical path of peritoneum bx shows metastatic carcinoma morphologically consistent with goblet cell adenocarcinoma.   - Ferny previously has a prolonged response to FOLFOX following initial surgical resection.   - She did however develop a hypersensitivity reaction to the oxaliplatinum which required discontinuation of this medication. There  is a significant risk of this problem recurring if she were to receive the same medication again. Dr. Mesa is therefore recommending the  FOLFIRI regimen (irinotecan, bolus 5FU followed by infusional 5FU over 46 hours).  We reviewed the side effects typically seen with irinotecan including risk of low blood counts, diarrhea and hair loss,  - Ferny was supposed to start this regimen on 12/15/21.  But with her surgery, we would anticipate initiating chemo in our clinic in about 2 weeks, after she has had time to recover.  - Her chemotherapy will include a 46 hour continuous infusion which will not be compatible with TPN which began on 12/14/21.    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 Dr. Mesa is investigating whether an assay that can determine if  the patient has a homologous recombination deficiency as it could be be very helpful in determining whether PARP inhibition could be used at some point in the future.     - Plan to repeat genetic mutation studies on current biopsy.  - Plan to  follow up with Dr. Mesa as an outpatient in early January. Will discuss need for additional venous access if she continues to require TPN.     GI bleeding.   - Recent report of passing blood and mucous per rectum.    - 12/10/21:  EGD by Dr. Kerr.  No mass lesion or obvious bleeding.   - Hemoglobin 10.3 g/dL, down from 12.4 g/dL at admit. 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  - Likely due to prior oxaliplatin Her symptoms have now largely resolved.     Thyroid goiter   - Recent CT scan shows the hyroid goiter appears unchanged.     Addison Rm APRN, CNP  [facility]  [phone] (office), [phone] (cell)        Interval History:     Abdominal pain fairly well controlled. Taking sips of clear liquids. We talked about a plan to consider chemotherapy in early January.              Review of Systems:     The 5 point Review of Systems is negative other than noted in the HPI             Medications:   Scheduled Medications    enoxaparin ANTICOAGULANT  40 mg Subcutaneous Q24H     famotidine  20 mg Intravenous BID     insulin aspart  1-6 Units Subcutaneous Q4H     lipids  250 mL Intravenous Once per day on Mon Tue Wed Thu Fri     sodium chloride (PF)  3 mL Intracatheter Q8H     PRN Medications  acetaminophen, bisacodyl, dextrose, glucose **OR** dextrose **OR** glucagon, diphenhydrAMINE **OR** diphenhydrAMINE, hydrALAZINE, HYDROmorphone **OR** HYDROmorphone, ketorolac, lidocaine 4%, lidocaine viscous 2% 15 mL and maalox/mylanta w/ simethicone 15 mL (GI COCKTAIL), lidocaine (buffered or not buffered), metoprolol, naloxone **OR** naloxone **OR** naloxone **OR** naloxone, ondansetron **OR** ondansetron, oxyCODONE **OR** oxyCODONE, prochlorperazine **OR** prochlorperazine, sodium chloride (PF)               Physical Exam:   Vitals were reviewed  Blood pressure (!) 136/91, pulse 92, temperature 99.2  F (37.3  C),  "temperature source Oral, resp. rate 16, height 1.575 m (5' 2\"), weight 69.4 kg (153 lb 1.6 oz), SpO2 98 %.  Wt Readings from Last 4 Encounters:   12/15/21 69.4 kg (153 lb 1.6 oz)   04/15/21 66.7 kg (147 lb)   02/13/21 64.4 kg (142 lb)   08/05/20 72.3 kg (159 lb 6.3 oz)       I/O last 3 completed shifts:  In: 0   Out: 800 [Emesis/NG output:800]    Constitutional: Awake, alert, cooperative, no apparent distress            Data:   All laboratory data and imaging studies reviewed.    CMP  Recent Labs   Lab 12/17/21  0359 12/17/21  0003 12/16/21  2205 12/16/21  1559 12/16/21  1306 12/16/21  1037 12/16/21  0633 12/16/21  0552 12/15/21  0831 12/15/21  0624 12/15/21  0006 12/14/21  1613 12/14/21  0631 12/13/21  0640 12/13/21  0018 12/12/21  2125   NA  --   --   --   --   --   --   --  140  --  140  --  142  --   --   --  143   POTASSIUM  --   --   --   --   --  3.5  --  3.3*  --  3.4  --  3.5  --   --   --  3.4   CHLORIDE  --   --   --   --   --   --   --  106  --  104  --  108  --   --   --  109   CO2  --   --   --   --   --   --   --  29  --  32  --  31  --   --   --  29   ANIONGAP  --   --   --   --   --   --   --  5  --  4  --  3  --   --   --  5   * 123* 127* 108*   < >  --    < > 112*   < > 111*   < > 84   < >  --    < > 91   BUN  --   --   --   --   --   --   --  12  --  11  --  10  --   --   --  9   CR  --   --   --   --   --   --   --  0.68  --  0.70  --  0.76  --  0.76  --  0.78   GFRESTIMATED  --   --   --   --   --   --   --  >90  --  >90  --  >90  --  >90  --  >90   IRON  --   --   --   --   --   --   --  9.1  --  8.6  --  8.5  --   --   --  9.2   MAG  --   --   --   --   --   --   --  1.7  --  1.9  --  1.6  --   --   --   --    PHOS  --   --   --   --   --   --   --  3.4  --  3.0  --  2.0*  --   --   --   --    PROTTOTAL  --   --   --   --   --   --   --   --   --  6.0*  --  5.8*  --   --   --  7.0   ALBUMIN  --   --   --   --   --   --   --   --   --  2.5*  --  2.5*  --   --   --  3.4   BILITOTAL  --   " --   --   --   --   --   --   --   --  0.2  --  0.5  --   --   --  0.5   ALKPHOS  --   --   --   --   --   --   --   --   --  74  --  75  --   --   --  98   AST  --   --   --   --   --   --   --   --   --  15  --  21  --   --   --  24   ALT  --   --   --   --   --   --   --   --   --  21  --  20  --   --   --  26    < > = values in this interval not displayed.     CBC  Recent Labs   Lab 12/16/21  0552 12/15/21  0625 12/12/21  2125   WBC  --  5.3 6.2   RBC  --  3.81 4.72   HGB  --  10.3* 12.4   HCT  --  31.4* 38.3   MCV  --  82 81   MCH  --  27.0 26.3*   MCHC  --  32.8 32.4   RDW  --  13.9 14.0    183 248     INR  Recent Labs   Lab 12/15/21  0624 12/14/21  1613   INR 1.06 1.13

## 2021-12-17 NOTE — PLAN OF CARE
A&O x4. VSS ex HTN and tachycardia at times. BP was 152/99, PRN hydralazine given. C/o headache and abdominal discomfort, dilaudid x1 administered. Pt vomited overnight, compazine x1 given. BS are hypoactive, denies passing flatus. G tube draining w/ gravity, green drainage present. Ax1 to BR. NPO ex meds and ice chips.

## 2021-12-18 LAB
GLUCOSE BLDC GLUCOMTR-MCNC: 112 MG/DL (ref 70–99)
GLUCOSE BLDC GLUCOMTR-MCNC: 113 MG/DL (ref 70–99)
GLUCOSE BLDC GLUCOMTR-MCNC: 113 MG/DL (ref 70–99)
GLUCOSE BLDC GLUCOMTR-MCNC: 114 MG/DL (ref 70–99)
GLUCOSE BLDC GLUCOMTR-MCNC: 114 MG/DL (ref 70–99)
GLUCOSE BLDC GLUCOMTR-MCNC: 117 MG/DL (ref 70–99)
GLUCOSE BLDC GLUCOMTR-MCNC: 91 MG/DL (ref 70–99)
MAGNESIUM SERPL-MCNC: 1.8 MG/DL (ref 1.6–2.3)
PHOSPHATE SERPL-MCNC: 3.3 MG/DL (ref 2.5–4.5)
POTASSIUM BLD-SCNC: 3.6 MMOL/L (ref 3.4–5.3)

## 2021-12-18 PROCEDURE — 250N000009 HC RX 250: Performed by: PHYSICIAN ASSISTANT

## 2021-12-18 PROCEDURE — 83735 ASSAY OF MAGNESIUM: CPT | Performed by: NURSE PRACTITIONER

## 2021-12-18 PROCEDURE — 250N000011 HC RX IP 250 OP 636: Performed by: PHYSICIAN ASSISTANT

## 2021-12-18 PROCEDURE — 250N000009 HC RX 250: Performed by: NURSE PRACTITIONER

## 2021-12-18 PROCEDURE — 258N000003 HC RX IP 258 OP 636: Performed by: NURSE PRACTITIONER

## 2021-12-18 PROCEDURE — 84100 ASSAY OF PHOSPHORUS: CPT | Performed by: NURSE PRACTITIONER

## 2021-12-18 PROCEDURE — 84132 ASSAY OF SERUM POTASSIUM: CPT | Performed by: NURSE PRACTITIONER

## 2021-12-18 PROCEDURE — 120N000001 HC R&B MED SURG/OB

## 2021-12-18 RX ADMIN — HYDROMORPHONE HYDROCHLORIDE 0.4 MG: 0.2 INJECTION, SOLUTION INTRAMUSCULAR; INTRAVENOUS; SUBCUTANEOUS at 08:18

## 2021-12-18 RX ADMIN — ASCORBIC ACID, VITAMIN A PALMITATE, CHOLECALCIFEROL, THIAMINE HYDROCHLORIDE, RIBOFLAVIN-5 PHOSPHATE SODIUM, PYRIDOXINE HYDROCHLORIDE, NIACINAMIDE, DEXPANTHENOL, ALPHA-TOCOPHEROL ACETATE, VITAMIN K1, FOLIC ACID, BIOTIN, CYANOCOBALAMIN: 200; 3300; 200; 6; 3.6; 6; 40; 15; 10; 150; 600; 60; 5 INJECTION, SOLUTION INTRAVENOUS at 20:34

## 2021-12-18 RX ADMIN — ENOXAPARIN SODIUM 40 MG: 40 INJECTION SUBCUTANEOUS at 20:33

## 2021-12-18 RX ADMIN — FAMOTIDINE 20 MG: 10 INJECTION, SOLUTION INTRAVENOUS at 20:33

## 2021-12-18 RX ADMIN — HYDROMORPHONE HYDROCHLORIDE 0.2 MG: 0.2 INJECTION, SOLUTION INTRAMUSCULAR; INTRAVENOUS; SUBCUTANEOUS at 03:40

## 2021-12-18 RX ADMIN — HYDROMORPHONE HYDROCHLORIDE 0.4 MG: 0.2 INJECTION, SOLUTION INTRAMUSCULAR; INTRAVENOUS; SUBCUTANEOUS at 17:43

## 2021-12-18 RX ADMIN — ONDANSETRON 4 MG: 2 INJECTION INTRAMUSCULAR; INTRAVENOUS at 17:39

## 2021-12-18 RX ADMIN — FAMOTIDINE 20 MG: 10 INJECTION, SOLUTION INTRAVENOUS at 08:27

## 2021-12-18 RX ADMIN — HYDROMORPHONE HYDROCHLORIDE 0.4 MG: 0.2 INJECTION, SOLUTION INTRAMUSCULAR; INTRAVENOUS; SUBCUTANEOUS at 13:44

## 2021-12-18 RX ADMIN — SODIUM CHLORIDE, POTASSIUM CHLORIDE, SODIUM LACTATE AND CALCIUM CHLORIDE: 600; 310; 30; 20 INJECTION, SOLUTION INTRAVENOUS at 13:45

## 2021-12-18 ASSESSMENT — ACTIVITIES OF DAILY LIVING (ADL)
ADLS_ACUITY_SCORE: 9
ADLS_ACUITY_SCORE: 9
ADLS_ACUITY_SCORE: 7
ADLS_ACUITY_SCORE: 9
ADLS_ACUITY_SCORE: 7
ADLS_ACUITY_SCORE: 9
ADLS_ACUITY_SCORE: 7
ADLS_ACUITY_SCORE: 9
ADLS_ACUITY_SCORE: 7
ADLS_ACUITY_SCORE: 9
ADLS_ACUITY_SCORE: 7
ADLS_ACUITY_SCORE: 9
ADLS_ACUITY_SCORE: 7
ADLS_ACUITY_SCORE: 9
ADLS_ACUITY_SCORE: 7
ADLS_ACUITY_SCORE: 9

## 2021-12-18 ASSESSMENT — MIFFLIN-ST. JEOR: SCORE: 1322.48

## 2021-12-18 NOTE — PROGRESS NOTES
Surgery    Resting in bed.  No new complaints today.  Overall feels better.  No fevers or nausea.    Abdomen-soft with minimal tenderness.  Tube in place.    A/P  Continue current care.  Probable discharge on Monday.    Richard Huston M.D.  [facility]  [phone]

## 2021-12-18 NOTE — PLAN OF CARE
A&OX4 VSS on RA. Pain is controlled with dilaudid x2 this shift. Denies nausea during night. Up with SBA, voiding in bathroom. Abdominal midline incision dressing is CDI. G-tube set to gravity drain, thin green drainage. TPN running at 70ml/hr with lipids, LR at 55. Total . BG q4hrs, no insulin needed this shift. Likely discharge on Monday with home cares.

## 2021-12-18 NOTE — PLAN OF CARE
A&O, BP elevated on RA, Dilaudid IV x 2, has the tendency to wait for the pain to go up. NPO, except sips of clears, SBA/independent in transfers, possible discharge to home with home health care on Monday. Continue to monitor.

## 2021-12-18 NOTE — PLAN OF CARE
A/Ox4. VSS. Ax1 to BR, voiding. Pt reports medium BM this afternoon. Pain controlled with Toradol x 1 this shift.  Nausea this a.m. Zofran given for relief.  TPN running at 70 mL/hr and LR running at 55 mL/hr. G tube draining w/ gravity w/ green drainage. BG Q4hrs, last BG was 102. Plan for discharge to home on Monday with home health assist of TPN. Pt aware of arrangements.

## 2021-12-18 NOTE — PROGRESS NOTES
Hematology oncology chart check.    41-year-old lady with history of metastatic high-grade goblet cell carcinoma of appendix.  An initial response to FOLFOX, recent relapse.  Awaiting to start second line chemotherapy with FOLFIRI.   Patient admitted with symptoms of bowel obstruction and underwent exploratory laparotomy lysis of adhesions and placement of G-tube and peritoneal biopsy on 12/12/2021.     Plan  Recovering from surgery.   Expected discharge on Monday.  Plan follow-up with Dr. Mesa in outpatient clinic to start second line chemotherapy.     Shamir Adame  [facility].

## 2021-12-19 LAB
GLUCOSE BLDC GLUCOMTR-MCNC: 102 MG/DL (ref 70–99)
GLUCOSE BLDC GLUCOMTR-MCNC: 108 MG/DL (ref 70–99)
GLUCOSE BLDC GLUCOMTR-MCNC: 114 MG/DL (ref 70–99)
GLUCOSE BLDC GLUCOMTR-MCNC: 127 MG/DL (ref 70–99)
GLUCOSE BLDC GLUCOMTR-MCNC: 99 MG/DL (ref 70–99)
PLATELET # BLD AUTO: 258 10E3/UL (ref 150–450)

## 2021-12-19 PROCEDURE — 250N000011 HC RX IP 250 OP 636: Performed by: PHYSICIAN ASSISTANT

## 2021-12-19 PROCEDURE — 258N000003 HC RX IP 258 OP 636: Performed by: NURSE PRACTITIONER

## 2021-12-19 PROCEDURE — 85049 AUTOMATED PLATELET COUNT: CPT | Performed by: PHYSICIAN ASSISTANT

## 2021-12-19 PROCEDURE — 120N000001 HC R&B MED SURG/OB

## 2021-12-19 PROCEDURE — 250N000009 HC RX 250: Performed by: PHYSICIAN ASSISTANT

## 2021-12-19 PROCEDURE — 250N000009 HC RX 250: Performed by: NURSE PRACTITIONER

## 2021-12-19 RX ADMIN — ENOXAPARIN SODIUM 40 MG: 40 INJECTION SUBCUTANEOUS at 21:31

## 2021-12-19 RX ADMIN — SODIUM CHLORIDE, POTASSIUM CHLORIDE, SODIUM LACTATE AND CALCIUM CHLORIDE: 600; 310; 30; 20 INJECTION, SOLUTION INTRAVENOUS at 07:58

## 2021-12-19 RX ADMIN — ONDANSETRON 4 MG: 2 INJECTION INTRAMUSCULAR; INTRAVENOUS at 15:38

## 2021-12-19 RX ADMIN — HYDROMORPHONE HYDROCHLORIDE 0.4 MG: 0.2 INJECTION, SOLUTION INTRAMUSCULAR; INTRAVENOUS; SUBCUTANEOUS at 15:38

## 2021-12-19 RX ADMIN — FAMOTIDINE 20 MG: 10 INJECTION, SOLUTION INTRAVENOUS at 21:31

## 2021-12-19 RX ADMIN — ASCORBIC ACID, VITAMIN A PALMITATE, CHOLECALCIFEROL, THIAMINE HYDROCHLORIDE, RIBOFLAVIN-5 PHOSPHATE SODIUM, PYRIDOXINE HYDROCHLORIDE, NIACINAMIDE, DEXPANTHENOL, ALPHA-TOCOPHEROL ACETATE, VITAMIN K1, FOLIC ACID, BIOTIN, CYANOCOBALAMIN: 200; 3300; 200; 6; 3.6; 6; 40; 15; 10; 150; 600; 60; 5 INJECTION, SOLUTION INTRAVENOUS at 21:36

## 2021-12-19 RX ADMIN — ONDANSETRON 4 MG: 2 INJECTION INTRAMUSCULAR; INTRAVENOUS at 08:06

## 2021-12-19 RX ADMIN — FAMOTIDINE 20 MG: 10 INJECTION, SOLUTION INTRAVENOUS at 08:07

## 2021-12-19 RX ADMIN — LIDOCAINE: 40 CREAM TOPICAL at 20:00

## 2021-12-19 ASSESSMENT — ACTIVITIES OF DAILY LIVING (ADL)
ADLS_ACUITY_SCORE: 7
ADLS_ACUITY_SCORE: 5
ADLS_ACUITY_SCORE: 7
ADLS_ACUITY_SCORE: 5
ADLS_ACUITY_SCORE: 7
ADLS_ACUITY_SCORE: 5
ADLS_ACUITY_SCORE: 7
ADLS_ACUITY_SCORE: 5
ADLS_ACUITY_SCORE: 5
ADLS_ACUITY_SCORE: 7
ADLS_ACUITY_SCORE: 5
ADLS_ACUITY_SCORE: 7

## 2021-12-19 NOTE — PLAN OF CARE
A&Ox4. VSS on RA ex HTN at times. Pt does not c/o pain. Prn Zofran x1 for N&V. Independent  in room. NPO ex for meds/ice chips. R port infusing LR @ 55 mL/hr & TPN @ 70 mL/hr. G tube to gravity. Q4hr , 114.  Midline abd incision CDI. Surg, hem/onc. Plan pending.

## 2021-12-19 NOTE — PROGRESS NOTES
A/O x4. VSS on RA, ex HTN. C/o 7/10 pain, dilaudid given, ice and abdominal binder applied. Up SBA. Port on right chest, due for needle and dressing change tomorrow. NPO, ex ice chips. G tube on left side, midline incision CDI. Discharging home with home infusions likely Monday.

## 2021-12-19 NOTE — PLAN OF CARE
POD#6. Pt is A+O x 4. VSS on RA except HTN. Up IND. Dressing on midline abd incision is CDI. Abd binder in use. G-tube is patent. TPN and PIV are infusing. BG accu check Q4 hours 108, 102. Voiding.

## 2021-12-19 NOTE — PROGRESS NOTES
Surgery    Attempted to see patient multiple times during rounds but she was not in her room.  Plan for discharge tomorrow.  Please call if any new issues today.    Richard Huston M.D.  [facility]  [phone]

## 2021-12-20 ENCOUNTER — HOME INFUSION (PRE-WILLOW HOME INFUSION) (OUTPATIENT)
Dept: PHARMACY | Facility: CLINIC | Age: 41
End: 2021-12-20
Payer: COMMERCIAL

## 2021-12-20 VITALS
HEART RATE: 78 BPM | SYSTOLIC BLOOD PRESSURE: 170 MMHG | WEIGHT: 147.49 LBS | OXYGEN SATURATION: 100 % | DIASTOLIC BLOOD PRESSURE: 101 MMHG | RESPIRATION RATE: 16 BRPM | HEIGHT: 62 IN | TEMPERATURE: 99.4 F | BODY MASS INDEX: 27.14 KG/M2

## 2021-12-20 LAB
ALBUMIN SERPL-MCNC: 3 G/DL (ref 3.4–5)
ALP SERPL-CCNC: 135 U/L (ref 40–150)
ALT SERPL W P-5'-P-CCNC: 81 U/L (ref 0–50)
ANION GAP SERPL CALCULATED.3IONS-SCNC: 4 MMOL/L (ref 3–14)
AST SERPL W P-5'-P-CCNC: 50 U/L (ref 0–45)
BILIRUB SERPL-MCNC: 0.6 MG/DL (ref 0.2–1.3)
BUN SERPL-MCNC: 16 MG/DL (ref 7–30)
CALCIUM SERPL-MCNC: 9.3 MG/DL (ref 8.5–10.1)
CHLORIDE BLD-SCNC: 105 MMOL/L (ref 94–109)
CO2 SERPL-SCNC: 29 MMOL/L (ref 20–32)
CREAT SERPL-MCNC: 0.69 MG/DL (ref 0.52–1.04)
GFR SERPL CREATININE-BSD FRML MDRD: >90 ML/MIN/1.73M2
GLUCOSE BLD-MCNC: 106 MG/DL (ref 70–99)
GLUCOSE BLDC GLUCOMTR-MCNC: 122 MG/DL (ref 70–99)
GLUCOSE BLDC GLUCOMTR-MCNC: 123 MG/DL (ref 70–99)
GLUCOSE BLDC GLUCOMTR-MCNC: 125 MG/DL (ref 70–99)
GLUCOSE BLDC GLUCOMTR-MCNC: 130 MG/DL (ref 70–99)
INR PPP: 0.99 (ref 0.85–1.15)
MAGNESIUM SERPL-MCNC: 2 MG/DL (ref 1.6–2.3)
PHOSPHATE SERPL-MCNC: 3.7 MG/DL (ref 2.5–4.5)
POTASSIUM BLD-SCNC: 3.7 MMOL/L (ref 3.4–5.3)
PROT SERPL-MCNC: 7.3 G/DL (ref 6.8–8.8)
SODIUM SERPL-SCNC: 138 MMOL/L (ref 133–144)
TRIGL SERPL-MCNC: 62 MG/DL

## 2021-12-20 PROCEDURE — 250N000011 HC RX IP 250 OP 636: Performed by: INTERNAL MEDICINE

## 2021-12-20 PROCEDURE — 80053 COMPREHEN METABOLIC PANEL: CPT | Performed by: NURSE PRACTITIONER

## 2021-12-20 PROCEDURE — 83735 ASSAY OF MAGNESIUM: CPT | Performed by: NURSE PRACTITIONER

## 2021-12-20 PROCEDURE — 258N000003 HC RX IP 258 OP 636: Performed by: NURSE PRACTITIONER

## 2021-12-20 PROCEDURE — 250N000013 HC RX MED GY IP 250 OP 250 PS 637: Performed by: PHYSICIAN ASSISTANT

## 2021-12-20 PROCEDURE — 85610 PROTHROMBIN TIME: CPT | Performed by: NURSE PRACTITIONER

## 2021-12-20 PROCEDURE — 84478 ASSAY OF TRIGLYCERIDES: CPT | Performed by: NURSE PRACTITIONER

## 2021-12-20 PROCEDURE — 84100 ASSAY OF PHOSPHORUS: CPT | Performed by: NURSE PRACTITIONER

## 2021-12-20 PROCEDURE — 250N000009 HC RX 250: Performed by: PHYSICIAN ASSISTANT

## 2021-12-20 RX ORDER — HEPARIN SODIUM,PORCINE 10 UNIT/ML
5-10 VIAL (ML) INTRAVENOUS
Status: DISCONTINUED | OUTPATIENT
Start: 2021-12-20 | End: 2021-12-20 | Stop reason: HOSPADM

## 2021-12-20 RX ORDER — HEPARIN SODIUM (PORCINE) LOCK FLUSH IV SOLN 100 UNIT/ML 100 UNIT/ML
5-10 SOLUTION INTRAVENOUS
Status: DISCONTINUED | OUTPATIENT
Start: 2021-12-20 | End: 2021-12-20 | Stop reason: HOSPADM

## 2021-12-20 RX ORDER — HEPARIN SODIUM,PORCINE 10 UNIT/ML
5-10 VIAL (ML) INTRAVENOUS EVERY 24 HOURS
Status: DISCONTINUED | OUTPATIENT
Start: 2021-12-20 | End: 2021-12-20 | Stop reason: HOSPADM

## 2021-12-20 RX ORDER — OXYCODONE HYDROCHLORIDE 5 MG/1
5 TABLET ORAL EVERY 4 HOURS PRN
Qty: 10 TABLET | Refills: 0 | Status: SHIPPED | OUTPATIENT
Start: 2021-12-20 | End: 2021-12-30

## 2021-12-20 RX ORDER — ONDANSETRON 4 MG/1
4-8 TABLET, ORALLY DISINTEGRATING ORAL EVERY 8 HOURS PRN
Qty: 30 TABLET | Refills: 3 | Status: SHIPPED | OUTPATIENT
Start: 2021-12-20

## 2021-12-20 RX ADMIN — SODIUM CHLORIDE, POTASSIUM CHLORIDE, SODIUM LACTATE AND CALCIUM CHLORIDE: 600; 310; 30; 20 INJECTION, SOLUTION INTRAVENOUS at 02:52

## 2021-12-20 RX ADMIN — FAMOTIDINE 20 MG: 10 INJECTION, SOLUTION INTRAVENOUS at 08:36

## 2021-12-20 RX ADMIN — HEPARIN SODIUM (PORCINE) LOCK FLUSH IV SOLN 100 UNIT/ML 5 ML: 100 SOLUTION at 15:05

## 2021-12-20 RX ADMIN — DIPHENHYDRAMINE HYDROCHLORIDE 25 MG: 25 CAPSULE ORAL at 05:57

## 2021-12-20 ASSESSMENT — ACTIVITIES OF DAILY LIVING (ADL)
ADLS_ACUITY_SCORE: 5

## 2021-12-20 ASSESSMENT — MIFFLIN-ST. JEOR: SCORE: 1297.25

## 2021-12-20 NOTE — PROGRESS NOTES
Chart Check:    Ferny will likely go home soon. She needs a few weeks of time to recover before we consider chemotherapy. If she is unable to take oral fluids and nourishment, then TPN will need to be a longer term solution for her nutrition. Getting TPN chronically will also require another IV line like a PICC for chemotherapy administration. We will discuss this at her follow up in early January. I will have the schedulers call her to arrange follow up with Dr. Mesa.    Addison MACDONALD, [facility]  [phone] (office) or [phone] (cell)

## 2021-12-20 NOTE — DISCHARGE INSTRUCTIONS
[facility] - [facility]ULT[facility]  Discharge Instructions: Post-Operative Exploratory Laparotomy    ACTIVITY    Take frequent, short walks and increase your activity gradually.      Avoid strenuous physical activity or heavy lifting greater than 15lbs. for 3-4 weeks.  You may climb stairs.    WOUND CARE    You may remove your bandage and shower 48 hours after the surgery.  Pat your incision dry and leave it open to air.      Surgical staples will remain in place for 2-3 weeks after discharge    Do not soak your incision in a tub or pool for 2 weeks.     Do not apply any lotions, creams, or ointments to your incision.    A ridge under your incision is normal and will gradually resolve.    DIET    Start with clear liquids, then gradually advance to full liquids as tolerated.     The Gastrostomy tube may be clamped for a few hours after taking any oral medications to allow absorption.  Unclamp and connect to drainage pouch if you experience any nausea.    PAIN    Expect some tenderness and discomfort at the incision site(s).  Use the prescribed pain medication at your discretion.  Expect gradual resolution of your pain over several days.    You may apply ice to your incisions in 20 minute intervals as needed for the next 48 hours.  After that time, consider switching to heat if you prefer.    EXPECTATIONS    Pain medications can cause constipation.  Limit use when possible.  Take over the counter stool softener/stimulant, such as Colace or Senna, 1-2 times a day with plenty of water.  You may take a mild over the counter laxative, such as Miralax or a suppository, as needed.     You may discontinue these medications once you are having regular bowel movements and/or are no longer taking your narcotic pain medication.      FOLLOW UP    Please call us at [phone] to schedule an appointment with Dr. Ibrahim or another Provider for staple removal.  We are located at [address] [place] [phone].    CALL OUR OFFICE -[phone] IF YOU HAVE:     Chills or fever above 101 F.    Increased redness, warmth, or drainage at your incisions.    Significant bleeding.    Pain not relieved by your pain medication or rest.    Increasing pain after the first 48 hours.    Any other concerns or questions.

## 2021-12-20 NOTE — PROGRESS NOTES
"Surgery    No nausea.  ++ bm  G-tube clamped  Tolerating clears  BP remains elevated but acceptable    Blood pressure (!) 144/104, pulse 73, temperature 99.3  F (37.4  C), temperature source Oral, resp. rate 16, height 1.575 m (5' 2\"), weight 66.9 kg (147 lb 7.8 oz), SpO2 100 %.    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    - continue clears for hydration as tolerated.   - unclamp g-tube if nausea.  - left vm with CC for discharge coordination  - home today with TPN    Rambo Magaña PA-C      "

## 2021-12-20 NOTE — PLAN OF CARE
VSS ex HTN, MD aware. A/O. Ind. Incisions CDI. Denies pain. G tube clamped, tolerating clears. TPN dc'd, port de-accessed. +bm. Voiding own. d'c papers and meds given. Discharge teaching regarding G tube care given, no questions. Home infusion/RN already set up.

## 2021-12-20 NOTE — PROGRESS NOTES
[facility]    Jefe will discharge today on TPN. [facility] will begin cycling her (20 hour cycle with 1L lactated ringer daily to maintain hydration). She will be at her sister's house in [place] for the next week or so ([address] [place] [address]). [facility] will provide home RN while she is in [place]. [facility] (P: [phone]) will manage after she returns to her home in [place], [place]. Jefe may taper down and disconnect from hospital TPN prior to discharge to home. [facility] will have a nurse to pt's sister's house tonight at 1930h to begin education on TPN and to hookup to home TPN. I reviewed the above information with Jefe and she is in agreement.     All requested orders have now been entered. Jefe is ready for discharge from [facility]'s perspective.     Thank you for the referral.    Pamela Hagan RN  [facility] Liaison  [phone] (Mon thru Fri 8am - 5pm)  [phone] Office

## 2021-12-20 NOTE — PLAN OF CARE
A&Ox4, VSS on RA ex HTN, no BP meds needed this shift. Pain tolerable and did not want pain medications. No nausea reported. NPO except fo ice chips and sips of clears. Ambulates independently, voiding in bathroom, last BM 12/19. G-tube open to gravity, flush 10cc every shift. Abdominal incision RYAN with staples, abd binder in place. TPN at 70ml/hr and LR at 55ml/hr. Possible discharge to home today.

## 2021-12-20 NOTE — PLAN OF CARE
A&Ox4. VSS on RA ex HTN, see PRN parameters, no BP meds needed this shift. Pain managed with PRN IV dil x1 with PRN IV zofran x1, pt hesitant to take oral meds on an empty stomach. NPO ex ice/sips of clears. G-tube open to gravity, orders to flush q shift. Up ind to BR, BM x1 this evening. Port needle and dressing changed, TPN hung late due to re-access challenges, see MAR. LR running at 55 mL/hr. Bgs q4 hr, no sliding scale coverage needed. Midline incision to abd, CDI, abd binder in place, cut opening for g-tube in binder. Electrolyte checks in AM, unit collect. Possible discharge to home with home infusion tomorrow. Pt would like to address plan for food with providers in AM.

## 2021-12-21 ENCOUNTER — HOME INFUSION (PRE-WILLOW HOME INFUSION) (OUTPATIENT)
Dept: PHARMACY | Facility: CLINIC | Age: 41
End: 2021-12-21
Payer: COMMERCIAL

## 2021-12-21 LAB
PATH REPORT.ADDENDUM SPEC: NORMAL
PATH REPORT.COMMENTS IMP SPEC: NORMAL
PATH REPORT.FINAL DX SPEC: NORMAL
PATH REPORT.GROSS SPEC: NORMAL
PATH REPORT.MICROSCOPIC SPEC OTHER STN: NORMAL
PATH REPORT.RELEVANT HX SPEC: NORMAL
PHOTO IMAGE: NORMAL

## 2021-12-21 PROCEDURE — 88360 TUMOR IMMUNOHISTOCHEM/MANUAL: CPT | Mod: 26 | Performed by: PATHOLOGY

## 2021-12-21 NOTE — DISCHARGE SUMMARY
[facility] Discharge Summary    Jefe Blanc MRN# [id number]   Age: 41 year old YOB: 1980     Date of Admission:  12/12/2021  Date of Discharge::  12/20/2021  3:38 PM  Admitting Physician:  Mata Ibrahim MD  Discharge Physician:  Mata Ibrahim MD, MD             Admission Diagnoses:   Closed-loop small bowel obstruction          Discharge Diagnosis:   Closed-loop small bowel obstruction, diffuse carcinomatosis and recurrent appendiceal carcinoma          Procedures:   Procedure(s): Laparotomy       No other procedures performed during this admission           Medications Prior to Admission:     No medications prior to admission.             Discharge Medications:     Discharge Medication List as of 12/20/2021  2:44 PM      START taking these medications    Details   ondansetron (ZOFRAN-ODT) 4 MG ODT tab Take 1-2 tablets (4-8 mg) by mouth every 8 hours as needed for nausea, Disp-30 tablet, R-3, E-Prescribe      oxyCODONE (ROXICODONE) 5 MG tablet Take 1 tablet (5 mg) by mouth every 4 hours as needed, Disp-10 tablet, R-0, E-Prescribe      parenteral nutrition - PTA/DISCHARGE ORDER The TPN formula will print on the After Visit Summary Report., Disp-1 each, R-0, Local Print                   Consultations:   Consultation during this admission received from internal medicine and oncology          Brief History of Illness:   Patient has a history of previous SHELDON/BSO and right hemicolectomy for locally advanced appendiceal carcinoma.  This was approximately 1.5 years ago.  She was thought to have a possible recurrence but presented to the emergency department with what appeared to be a closed-loop small bowel obstruction on CT scan.             Hospital Course:   The patient's hospital course was unremarkable.  She recovered as anticipated and experienced no post-operative complications. Underwent emergent exploratory laparotomy where she was found to have diffuse  recurrence of her disease involving the abdominal wall, small bowel, colon, and stomach.  As the disease was not able to be safely resected, I elected to perform a venting gastrostomy tube placement.  She was placed on TPN and oncology was consulted.  She was able to be discharged from the hospital once she had return of bowel function and was able to take clear liquids for comfort.  She was to follow-up with oncology for consideration of additional adjuvant treatment.            Discharge Instructions and Follow-Up:   Discharge diet: Clear liquid   Discharge activity: Activity as tolerated   Discharge follow-up: Follow up with consultant in 1-2 weeks   Wound care: Keep wound clean and dry           Discharge Disposition:   Discharged to home      Attestation:  I have reviewed today's vital signs, notes, medications, labs and imaging.    Mata Ibrahim MD, MD

## 2021-12-22 ENCOUNTER — LAB REQUISITION (OUTPATIENT)
Dept: LAB | Facility: CLINIC | Age: 41
End: 2021-12-22
Payer: COMMERCIAL

## 2021-12-22 ENCOUNTER — HOME INFUSION (PRE-WILLOW HOME INFUSION) (OUTPATIENT)
Dept: PHARMACY | Facility: CLINIC | Age: 41
End: 2021-12-22
Payer: COMMERCIAL

## 2021-12-22 DIAGNOSIS — K56.609 UNSPECIFIED INTESTINAL OBSTRUCTION, UNSPECIFIED AS TO PARTIAL VERSUS COMPLETE OBSTRUCTION (H): ICD-10-CM

## 2021-12-22 LAB
ALBUMIN SERPL-MCNC: 3.3 G/DL (ref 3.4–5)
ALP SERPL-CCNC: 136 U/L (ref 40–150)
ALT SERPL W P-5'-P-CCNC: 63 U/L (ref 0–50)
ANION GAP SERPL CALCULATED.3IONS-SCNC: 6 MMOL/L (ref 3–14)
AST SERPL W P-5'-P-CCNC: 31 U/L (ref 0–45)
BASOPHILS # BLD AUTO: 0 10E3/UL (ref 0–0.2)
BASOPHILS NFR BLD AUTO: 1 %
BILIRUB DIRECT SERPL-MCNC: 0.1 MG/DL (ref 0–0.2)
BILIRUB SERPL-MCNC: 0.3 MG/DL (ref 0.2–1.3)
BUN SERPL-MCNC: 21 MG/DL (ref 7–30)
CALCIUM SERPL-MCNC: 9.6 MG/DL (ref 8.5–10.1)
CHLORIDE BLD-SCNC: 103 MMOL/L (ref 94–109)
CO2 SERPL-SCNC: 31 MMOL/L (ref 20–32)
CREAT SERPL-MCNC: 0.76 MG/DL (ref 0.52–1.04)
EOSINOPHIL # BLD AUTO: 0.2 10E3/UL (ref 0–0.7)
EOSINOPHIL NFR BLD AUTO: 3 %
ERYTHROCYTE [DISTWIDTH] IN BLOOD BY AUTOMATED COUNT: 13.8 % (ref 10–15)
FASTING STATUS PATIENT QL REPORTED: NORMAL
GFR SERPL CREATININE-BSD FRML MDRD: >90 ML/MIN/1.73M2
GLUCOSE BLD-MCNC: 98 MG/DL (ref 70–99)
HCT VFR BLD AUTO: 37.6 % (ref 35–47)
HGB BLD-MCNC: 12.4 G/DL (ref 11.7–15.7)
HOLD SPECIMEN: NORMAL
IMM GRANULOCYTES # BLD: 0 10E3/UL
IMM GRANULOCYTES NFR BLD: 0 %
LYMPHOCYTES # BLD AUTO: 2.1 10E3/UL (ref 0.8–5.3)
LYMPHOCYTES NFR BLD AUTO: 32 %
MAGNESIUM SERPL-MCNC: 2 MG/DL (ref 1.6–2.3)
MCH RBC QN AUTO: 26.6 PG (ref 26.5–33)
MCHC RBC AUTO-ENTMCNC: 33 G/DL (ref 31.5–36.5)
MCV RBC AUTO: 81 FL (ref 78–100)
MONOCYTES # BLD AUTO: 0.6 10E3/UL (ref 0–1.3)
MONOCYTES NFR BLD AUTO: 10 %
NEUTROPHILS # BLD AUTO: 3.4 10E3/UL (ref 1.6–8.3)
NEUTROPHILS NFR BLD AUTO: 54 %
NRBC # BLD AUTO: 0 10E3/UL
NRBC BLD AUTO-RTO: 0 /100
PHOSPHATE SERPL-MCNC: 3.6 MG/DL (ref 2.5–4.5)
PLATELET # BLD AUTO: 334 10E3/UL (ref 150–450)
POTASSIUM BLD-SCNC: 3.8 MMOL/L (ref 3.4–5.3)
PROT SERPL-MCNC: 7.8 G/DL (ref 6.8–8.8)
RBC # BLD AUTO: 4.66 10E6/UL (ref 3.8–5.2)
SODIUM SERPL-SCNC: 140 MMOL/L (ref 133–144)
TRIGL SERPL-MCNC: 62 MG/DL
WBC # BLD AUTO: 6.4 10E3/UL (ref 4–11)

## 2021-12-22 PROCEDURE — 82248 BILIRUBIN DIRECT: CPT | Performed by: SURGERY

## 2021-12-22 PROCEDURE — 84100 ASSAY OF PHOSPHORUS: CPT | Performed by: SURGERY

## 2021-12-22 PROCEDURE — 83735 ASSAY OF MAGNESIUM: CPT | Performed by: SURGERY

## 2021-12-22 PROCEDURE — 80053 COMPREHEN METABOLIC PANEL: CPT | Performed by: SURGERY

## 2021-12-22 PROCEDURE — 84478 ASSAY OF TRIGLYCERIDES: CPT | Performed by: SURGERY

## 2021-12-22 PROCEDURE — 85041 AUTOMATED RBC COUNT: CPT | Performed by: SURGERY

## 2021-12-23 ENCOUNTER — HOME INFUSION (PRE-WILLOW HOME INFUSION) (OUTPATIENT)
Dept: PHARMACY | Facility: CLINIC | Age: 41
End: 2021-12-23
Payer: COMMERCIAL

## 2021-12-24 ENCOUNTER — HOME INFUSION (PRE-WILLOW HOME INFUSION) (OUTPATIENT)
Dept: PHARMACY | Facility: CLINIC | Age: 41
End: 2021-12-24
Payer: COMMERCIAL

## 2021-12-24 ENCOUNTER — HOSPITAL ENCOUNTER (EMERGENCY)
Facility: CLINIC | Age: 41
Discharge: HOME | End: 2021-12-25
Attending: EMERGENCY MEDICINE | Admitting: EMERGENCY MEDICINE
Payer: COMMERCIAL

## 2021-12-24 ENCOUNTER — APPOINTMENT (OUTPATIENT)
Dept: CT IMAGING | Facility: CLINIC | Age: 41
End: 2021-12-24
Attending: EMERGENCY MEDICINE
Payer: COMMERCIAL

## 2021-12-24 DIAGNOSIS — G89.18 POSTOPERATIVE PAIN: ICD-10-CM

## 2021-12-24 LAB
ALBUMIN SERPL-MCNC: 3.8 G/DL (ref 3.4–5)
ALP SERPL-CCNC: 156 U/L (ref 40–150)
ALT SERPL W P-5'-P-CCNC: 63 U/L (ref 0–50)
ANION GAP SERPL CALCULATED.3IONS-SCNC: 7 MMOL/L (ref 3–14)
AST SERPL W P-5'-P-CCNC: 33 U/L (ref 0–45)
BASOPHILS # BLD AUTO: 0.1 10E3/UL (ref 0–0.2)
BASOPHILS NFR BLD AUTO: 1 %
BILIRUB SERPL-MCNC: 0.5 MG/DL (ref 0.2–1.3)
BUN SERPL-MCNC: 25 MG/DL (ref 7–30)
CALCIUM SERPL-MCNC: 9.7 MG/DL (ref 8.5–10.1)
CHLORIDE BLD-SCNC: 101 MMOL/L (ref 94–109)
CO2 SERPL-SCNC: 32 MMOL/L (ref 20–32)
CREAT SERPL-MCNC: 0.9 MG/DL (ref 0.52–1.04)
EOSINOPHIL # BLD AUTO: 0.2 10E3/UL (ref 0–0.7)
EOSINOPHIL NFR BLD AUTO: 2 %
ERYTHROCYTE [DISTWIDTH] IN BLOOD BY AUTOMATED COUNT: 14.1 % (ref 10–15)
GFR SERPL CREATININE-BSD FRML MDRD: 83 ML/MIN/1.73M2
GLUCOSE BLD-MCNC: 103 MG/DL (ref 70–99)
HCT VFR BLD AUTO: 42 % (ref 35–47)
HGB BLD-MCNC: 13.3 G/DL (ref 11.7–15.7)
IMM GRANULOCYTES # BLD: 0 10E3/UL
IMM GRANULOCYTES NFR BLD: 0 %
LACTATE SERPL-SCNC: 0.7 MMOL/L (ref 0.7–2)
LYMPHOCYTES # BLD AUTO: 2.2 10E3/UL (ref 0.8–5.3)
LYMPHOCYTES NFR BLD AUTO: 25 %
MCH RBC QN AUTO: 26.2 PG (ref 26.5–33)
MCHC RBC AUTO-ENTMCNC: 31.7 G/DL (ref 31.5–36.5)
MCV RBC AUTO: 83 FL (ref 78–100)
MONOCYTES # BLD AUTO: 0.6 10E3/UL (ref 0–1.3)
MONOCYTES NFR BLD AUTO: 7 %
NEUTROPHILS # BLD AUTO: 5.9 10E3/UL (ref 1.6–8.3)
NEUTROPHILS NFR BLD AUTO: 65 %
NRBC # BLD AUTO: 0 10E3/UL
NRBC BLD AUTO-RTO: 0 /100
PLATELET # BLD AUTO: 382 10E3/UL (ref 150–450)
POTASSIUM BLD-SCNC: 3.4 MMOL/L (ref 3.4–5.3)
PROT SERPL-MCNC: 8.6 G/DL (ref 6.8–8.8)
RBC # BLD AUTO: 5.08 10E6/UL (ref 3.8–5.2)
SODIUM SERPL-SCNC: 140 MMOL/L (ref 133–144)
WBC # BLD AUTO: 9 10E3/UL (ref 4–11)

## 2021-12-24 PROCEDURE — 80053 COMPREHEN METABOLIC PANEL: CPT | Performed by: EMERGENCY MEDICINE

## 2021-12-24 PROCEDURE — 36415 COLL VENOUS BLD VENIPUNCTURE: CPT | Performed by: EMERGENCY MEDICINE

## 2021-12-24 PROCEDURE — 74177 CT ABD & PELVIS W/CONTRAST: CPT

## 2021-12-24 PROCEDURE — 99285 EMERGENCY DEPT VISIT HI MDM: CPT | Mod: 25

## 2021-12-24 PROCEDURE — 83605 ASSAY OF LACTIC ACID: CPT | Performed by: EMERGENCY MEDICINE

## 2021-12-24 PROCEDURE — 85025 COMPLETE CBC W/AUTO DIFF WBC: CPT | Performed by: EMERGENCY MEDICINE

## 2021-12-24 RX ORDER — IOPAMIDOL 755 MG/ML
74 INJECTION, SOLUTION INTRAVASCULAR ONCE
Status: COMPLETED | OUTPATIENT
Start: 2021-12-24 | End: 2021-12-25

## 2021-12-24 RX ORDER — SODIUM CHLORIDE 9 MG/ML
INJECTION, SOLUTION INTRAVENOUS CONTINUOUS
Status: DISCONTINUED | OUTPATIENT
Start: 2021-12-25 | End: 2021-12-25 | Stop reason: HOSPADM

## 2021-12-24 ASSESSMENT — ENCOUNTER SYMPTOMS
NAUSEA: 0
ABDOMINAL PAIN: 1
FEVER: 0

## 2021-12-25 VITALS
DIASTOLIC BLOOD PRESSURE: 87 MMHG | RESPIRATION RATE: 18 BRPM | SYSTOLIC BLOOD PRESSURE: 117 MMHG | HEART RATE: 103 BPM | OXYGEN SATURATION: 98 %

## 2021-12-25 PROCEDURE — 250N000011 HC RX IP 250 OP 636: Performed by: EMERGENCY MEDICINE

## 2021-12-25 PROCEDURE — 96374 THER/PROPH/DIAG INJ IV PUSH: CPT | Mod: 59

## 2021-12-25 RX ORDER — HEPARIN SODIUM (PORCINE) LOCK FLUSH IV SOLN 100 UNIT/ML 100 UNIT/ML
5-10 SOLUTION INTRAVENOUS
Status: DISCONTINUED | OUTPATIENT
Start: 2021-12-25 | End: 2021-12-25 | Stop reason: HOSPADM

## 2021-12-25 RX ORDER — HYDROMORPHONE HYDROCHLORIDE 1 MG/ML
0.5 INJECTION, SOLUTION INTRAMUSCULAR; INTRAVENOUS; SUBCUTANEOUS ONCE
Status: COMPLETED | OUTPATIENT
Start: 2021-12-25 | End: 2021-12-25

## 2021-12-25 RX ADMIN — HEPARIN 5 ML: 100 SYRINGE at 02:02

## 2021-12-25 RX ADMIN — IOPAMIDOL 74 ML: 755 INJECTION, SOLUTION INTRAVENOUS at 01:02

## 2021-12-25 RX ADMIN — HYDROMORPHONE HYDROCHLORIDE 0.5 MG: 1 INJECTION, SOLUTION INTRAMUSCULAR; INTRAVENOUS; SUBCUTANEOUS at 02:01

## 2021-12-25 NOTE — ED PROVIDER NOTES
History   Chief Complaint:  Abdominal Pain       The history is provided by the patient.      Jefe Blanc is a 41 year old female with history of diverticulitis, ovarian cancer, SBO, appendix carcinoma, and depression who presents with abdominal pain. Starting this morning the patient began to experience tightness in the upper middle of her abdomen. She reports the pressure does not feel similar to the pain she had during her previous bowel obstruction. Upon arrival to the ED she denies any nausea or fever.  No other complaints.    Review of Systems   Constitutional: Negative for fever.   Gastrointestinal: Positive for abdominal pain. Negative for nausea.   All other systems reviewed and are negative.      Allergies:  Azithromycin  Lactose    Medications:  ondansetron   oxycodone   parenteral nutrition   citalopram     Past Medical History:     Anxiety   diverticulitis  Ovarian cancer  Ovarian cyst   Adnexal mass  Small bowel obstruction  Small intestine obstruction    Iron deficiency anemia  Major depression  Appendix carcinoma     Past Surgical History:    Adenoidectomy   Colectomy without colostomy  ENT surgery, tubes  Hysterectomy supracervical, bilateral salpingo-oophorectomy   IR chest port placement   laparoscopic cystectomy ovarian   Laparotomy exploratory   Post partum tubal ligation    Appendectomy     Family History:    Endometriosis   Fibroids   Hypertension   Diabetes     Social History:  Presents with daughter  PCP: Kimberly Huizar     Physical Exam     Patient Vitals for the past 24 hrs:   BP Pulse Resp SpO2   12/25/21 0210 117/87 103 -- --   12/25/21 0025 132/88 93 -- 98 %   12/24/21 2255 (!) 142/101 110 18 100 %       Physical Exam  General: Resting on the gurney, appears uncomfortable  Head:  The scalp, face, and head appear normal  Mouth/Throat: Mucus membranes are moist  CV:  Regular rate    Normal S1 and S2  No pathological murmur   Resp:  Breath sounds clear and equal  bilaterally    Non-labored, no retractions or accessory muscle use    No coarseness    No wheezing   GI:  Diffuse abdomial tenderness to palpation, no guarding no rebound    G tube in place and sutured down  MS:  Normal motor assessment of all extremities.    Good capillary refill noted.  Skin:   Midline laparotomy incision with staples, intact and healing appropriately   Neuro:   Speech is normal and fluent. No apparent deficit.  Psych: Awake. Alert.  Normal affect.      Appropriate interactions.    Emergency Department Course     Imaging:  CT Abdomen Pelvis w Contrast   Final Result   IMPRESSION:    1.  Interval laparotomy with resolution of the previously seen focally dilated fluid-filled small bowel loop in the lower abdomen. There is ill-defined fluid in the subcutaneous tissues at the incisional site, without organized fluid collection.      2.  New gastrostomy which is intraluminal.      3.  Equivocal wall thickening of the rectum. Recommend clinical correlation for proctitis.      4.  Small esophageal hiatal hernia.           Report per radiology    Laboratory:  Labs Ordered and Resulted from Time of ED Arrival to Time of ED Departure   COMPREHENSIVE METABOLIC PANEL - Abnormal       Result Value    Sodium 140      Potassium 3.4      Chloride 101      Carbon Dioxide (CO2) 32      Anion Gap 7      Urea Nitrogen 25      Creatinine 0.90      Calcium 9.7      Glucose 103 (*)     Alkaline Phosphatase 156 (*)     AST 33      ALT 63 (*)     Protein Total 8.6      Albumin 3.8      Bilirubin Total 0.5      GFR Estimate 83     CBC WITH PLATELETS AND DIFFERENTIAL - Abnormal    WBC Count 9.0      RBC Count 5.08      Hemoglobin 13.3      Hematocrit 42.0      MCV 83      MCH 26.2 (*)     MCHC 31.7      RDW 14.1      Platelet Count 382      % Neutrophils 65      % Lymphocytes 25      % Monocytes 7      % Eosinophils 2      % Basophils 1      % Immature Granulocytes 0      NRBCs per 100 WBC 0      Absolute Neutrophils 5.9       Absolute Lymphocytes 2.2      Absolute Monocytes 0.6      Absolute Eosinophils 0.2      Absolute Basophils 0.1      Absolute Immature Granulocytes 0.0      Absolute NRBCs 0.0     LACTIC ACID WHOLE BLOOD - Normal    Lactic Acid 0.7         Emergency Department Course:    Reviewed:  I reviewed nursing notes, vitals, past medical history and Care Everywhere    Assessments:  2254 I obtained history and examined the patient as noted above.   0145 I rechecked the patient and explained findings.     Interventions:  0201 Hydromorphone, 0.5 mg, IV   0202 Heparin, 5 mL, Intracatheter     Disposition:  The patient was discharged to home.     Impression & Plan     Medical Decision Making:  Jefe Blanc is a 41 year old female with a complex abdominal history who presents to the emergency department with abdominal pain.  She believes that there may be a problem with her G-tube.  A CT was obtained and had no evidence of G-tube malposition or surrounding inflammation.  She reports feeling better after treatment in the emergency department and will follow closely with her surgical team.  She will return right away for fevers, swelling, or any worsening symptoms.      Diagnosis:    ICD-10-CM    1. Postoperative pain  G89.18        Scribe Disclosure:  I, Natacha Khoa, am serving as a scribe at 10:53 PM on 12/24/2021 to document services personally performed by Genesis Alanis MD based on my observations and the provider's statements to me.            Genesis Alanis MD  12/25/21 8801

## 2021-12-25 NOTE — ED TRIAGE NOTES
"\"I have really tight pain in my upper abdomen.\" Patient has history of cancer of her appendix. Pt thinks her gtube is the cause of her abd pain. States port was accessed on Monday.    "

## 2021-12-25 NOTE — DISCHARGE INSTRUCTIONS
Call your surgeons office tomorrow.  If you have ongoing pain, talk to a surgeon - they should be able to review your scan and labs checked here.    Return for fevers or worsened pain.    If you feel your condition has changed or worsened, please call your doctor or return to the emergency department right away.

## 2021-12-27 ENCOUNTER — HOME INFUSION (PRE-WILLOW HOME INFUSION) (OUTPATIENT)
Dept: PHARMACY | Facility: CLINIC | Age: 41
End: 2021-12-27

## 2021-12-27 ENCOUNTER — LAB REQUISITION (OUTPATIENT)
Dept: LAB | Facility: CLINIC | Age: 41
End: 2021-12-27
Payer: COMMERCIAL

## 2021-12-27 DIAGNOSIS — K56.609 UNSPECIFIED INTESTINAL OBSTRUCTION, UNSPECIFIED AS TO PARTIAL VERSUS COMPLETE OBSTRUCTION (H): ICD-10-CM

## 2021-12-27 LAB
ALBUMIN SERPL-MCNC: 3.5 G/DL (ref 3.4–5)
ALP SERPL-CCNC: 120 U/L (ref 40–150)
ALT SERPL W P-5'-P-CCNC: 42 U/L (ref 0–50)
AST SERPL W P-5'-P-CCNC: 24 U/L (ref 0–45)
BASOPHILS # BLD AUTO: 0.1 10E3/UL (ref 0–0.2)
BASOPHILS NFR BLD AUTO: 1 %
BILIRUB DIRECT SERPL-MCNC: 0.2 MG/DL (ref 0–0.2)
BILIRUB DIRECT SERPL-MCNC: 0.2 MG/DL (ref 0–0.2)
BILIRUB SERPL-MCNC: 0.6 MG/DL (ref 0.2–1.3)
BILIRUB SERPL-MCNC: 0.6 MG/DL (ref 0.2–1.3)
EOSINOPHIL # BLD AUTO: 0.2 10E3/UL (ref 0–0.7)
EOSINOPHIL NFR BLD AUTO: 3 %
ERYTHROCYTE [DISTWIDTH] IN BLOOD BY AUTOMATED COUNT: 14 % (ref 10–15)
FASTING STATUS PATIENT QL REPORTED: NORMAL
HCT VFR BLD AUTO: 39.5 % (ref 35–47)
HGB BLD-MCNC: 13 G/DL (ref 11.7–15.7)
IMM GRANULOCYTES # BLD: 0 10E3/UL
IMM GRANULOCYTES NFR BLD: 0 %
LYMPHOCYTES # BLD AUTO: 2.4 10E3/UL (ref 0.8–5.3)
LYMPHOCYTES NFR BLD AUTO: 33 %
MAGNESIUM SERPL-MCNC: 2.1 MG/DL (ref 1.6–2.3)
MCH RBC QN AUTO: 26.7 PG (ref 26.5–33)
MCHC RBC AUTO-ENTMCNC: 32.9 G/DL (ref 31.5–36.5)
MCV RBC AUTO: 81 FL (ref 78–100)
MONOCYTES # BLD AUTO: 0.4 10E3/UL (ref 0–1.3)
MONOCYTES NFR BLD AUTO: 5 %
NEUTROPHILS # BLD AUTO: 4.1 10E3/UL (ref 1.6–8.3)
NEUTROPHILS NFR BLD AUTO: 58 %
NRBC # BLD AUTO: 0 10E3/UL
NRBC BLD AUTO-RTO: 0 /100
PHOSPHATE SERPL-MCNC: 3.5 MG/DL (ref 2.5–4.5)
PLATELET # BLD AUTO: 382 10E3/UL (ref 150–450)
PROT SERPL-MCNC: 8.2 G/DL (ref 6.8–8.8)
RBC # BLD AUTO: 4.87 10E6/UL (ref 3.8–5.2)
TRIGL SERPL-MCNC: 81 MG/DL
WBC # BLD AUTO: 7.2 10E3/UL (ref 4–11)

## 2021-12-27 PROCEDURE — 83735 ASSAY OF MAGNESIUM: CPT | Performed by: SURGERY

## 2021-12-27 PROCEDURE — 84100 ASSAY OF PHOSPHORUS: CPT | Performed by: SURGERY

## 2021-12-27 PROCEDURE — 85025 COMPLETE CBC W/AUTO DIFF WBC: CPT | Performed by: SURGERY

## 2021-12-27 PROCEDURE — 84460 ALANINE AMINO (ALT) (SGPT): CPT | Performed by: SURGERY

## 2021-12-27 PROCEDURE — 84075 ASSAY ALKALINE PHOSPHATASE: CPT | Performed by: SURGERY

## 2021-12-27 PROCEDURE — 82248 BILIRUBIN DIRECT: CPT | Performed by: SURGERY

## 2021-12-27 PROCEDURE — 84450 TRANSFERASE (AST) (SGOT): CPT | Performed by: SURGERY

## 2021-12-27 PROCEDURE — 84478 ASSAY OF TRIGLYCERIDES: CPT | Performed by: SURGERY

## 2021-12-27 PROCEDURE — 84155 ASSAY OF PROTEIN SERUM: CPT | Performed by: SURGERY

## 2021-12-27 PROCEDURE — 36592 COLLECT BLOOD FROM PICC: CPT | Performed by: SURGERY

## 2021-12-28 ENCOUNTER — HOME INFUSION (PRE-WILLOW HOME INFUSION) (OUTPATIENT)
Dept: PHARMACY | Facility: CLINIC | Age: 41
End: 2021-12-28
Payer: COMMERCIAL

## 2021-12-28 DIAGNOSIS — K56.609 SMALL BOWEL OBSTRUCTION (H): Primary | ICD-10-CM

## 2021-12-28 LAB
ALBUMIN SERPL-MCNC: 3.6 G/DL (ref 3.4–5)
ALP SERPL-CCNC: 120 U/L (ref 40–150)
ALT SERPL W P-5'-P-CCNC: 42 U/L (ref 0–50)
ANION GAP SERPL CALCULATED.3IONS-SCNC: 7 MMOL/L (ref 3–14)
AST SERPL W P-5'-P-CCNC: 25 U/L (ref 0–45)
BILIRUB SERPL-MCNC: 0.6 MG/DL (ref 0.2–1.3)
BUN SERPL-MCNC: 23 MG/DL (ref 7–30)
CALCIUM SERPL-MCNC: 9.5 MG/DL (ref 8.5–10.1)
CHLORIDE BLD-SCNC: 100 MMOL/L (ref 94–109)
CO2 SERPL-SCNC: 30 MMOL/L (ref 20–32)
CREAT SERPL-MCNC: 0.8 MG/DL (ref 0.52–1.04)
GFR SERPL CREATININE-BSD FRML MDRD: >90 ML/MIN/1.73M2
GLUCOSE BLD-MCNC: 92 MG/DL (ref 70–99)
POTASSIUM BLD-SCNC: 4.4 MMOL/L (ref 3.4–5.3)
PROT SERPL-MCNC: 7.9 G/DL (ref 6.8–8.8)
SODIUM SERPL-SCNC: 137 MMOL/L (ref 133–144)

## 2021-12-30 ENCOUNTER — VIRTUAL VISIT (OUTPATIENT)
Dept: SURGERY | Facility: CLINIC | Age: 41
End: 2021-12-30
Payer: COMMERCIAL

## 2021-12-30 ENCOUNTER — MEDICAL CORRESPONDENCE (OUTPATIENT)
Dept: HEALTH INFORMATION MANAGEMENT | Facility: CLINIC | Age: 41
End: 2021-12-30

## 2021-12-30 ENCOUNTER — TELEPHONE (OUTPATIENT)
Dept: SURGERY | Facility: CLINIC | Age: 41
End: 2021-12-30

## 2021-12-30 DIAGNOSIS — Z09 FOLLOW-UP EXAM: Primary | ICD-10-CM

## 2021-12-30 PROCEDURE — 99024 POSTOP FOLLOW-UP VISIT: CPT | Mod: 95 | Performed by: SURGERY

## 2021-12-30 NOTE — TELEPHONE ENCOUNTER
Patient was scheduled for a post op today with Dr. Ibrahim.  She is wanting to change this to virtual as she lives 3 hours away and does not feel safe to travel on the roads today    She does have to be down here next week to meet with oncology. Does still need staples removed    Changed her appointment to telephone visit today and will follow up with Dr. ibrahim about an appt time next week when she is in the area    Cass Mccray RN-BSN

## 2021-12-30 NOTE — PROGRESS NOTES
Jefe is a 41 year old who is being evaluated via a billable telephone visit.      What phone number would you like to be contacted at? [phone]    How would you like to obtain your AVS? Mail a copy    Assessment & Plan     Pleasant 41-year-old female with a very difficult and unfortunate problem.  She has a history of locally advanced appendiceal carcinoma for which she underwent right hemicolectomy, hysterectomy, and bilateral salpingo-oophorectomy.  Underwent chemo but was thought to have a probable recurrence.  She presented to the emergency department and I performed an exploratory laparotomy.  This confirmed diffuse disease with carcinomatosis and involvement of the transverse colon, stomach, liver, small bowel, mesentery, and peritoneum.  G-tube was placed and I closed.  Patient was discharged from the hospital on TPN which I think is her only option at this point.  She can eat and have clear liquids for comfort.  If she has recurrent abdominal pain her G-tube should be open to gravity.  I have nothing further to offer from a surgical standpoint.      No follow-ups on file.    Mata Ibrahim MD, MD  [facility] [place]    Fredy Mayberry is a 41 year old who presents for the following health issues   Recurrent appendiceal carcinoma, carcinomatosis, recurrent bowel obstructions    HPI     Patient with recurrent appendiceal carcinoma.  Now diffuse recurrent disease involving virtually the entire abdominal compartment.  No role for surgical intervention but gastrostomy tube was placed for venting.    Review of Systems   Constitutional, HEENT, cardiovascular, pulmonary, gi and gu systems are negative, except as otherwise noted.      Objective         Vitals:  No vitals were obtained today due to virtual visit.    Physical Exam   healthy and mild distress  PSYCH: Alert and oriented times 3; coherent speech, normal   rate and volume, able to articulate logical thoughts, able    to abstract reason, no tangential thoughts, no hallucinations   or delusions  Her affect is sad  RESP: No cough, no audible wheezing, able to talk in full sentences  Remainder of exam unable to be completed due to telephone visits        Phone call duration: 20 minutes

## 2022-01-03 ENCOUNTER — TELEPHONE (OUTPATIENT)
Dept: SURGERY | Facility: CLINIC | Age: 42
End: 2022-01-03
Payer: COMMERCIAL

## 2022-01-03 NOTE — TELEPHONE ENCOUNTER
Dr. Ibrahim not managing patient's TPN or Labs    Faxed letter stating this and provided number to Dr. Mesa's office    Cass Mccray, RN-BSN

## 2022-01-03 NOTE — TELEPHONE ENCOUNTER
Farhan with [employer] called to request an official signed order for lab draws from Dr. Ibrahim. All they received is a note from home care.    Fax: [phone]  Phone: [phone]

## 2022-01-04 ENCOUNTER — HOME INFUSION (PRE-WILLOW HOME INFUSION) (OUTPATIENT)
Dept: PHARMACY | Facility: CLINIC | Age: 42
End: 2022-01-04
Payer: COMMERCIAL

## 2022-01-05 ENCOUNTER — HOME INFUSION (PRE-WILLOW HOME INFUSION) (OUTPATIENT)
Dept: PHARMACY | Facility: CLINIC | Age: 42
End: 2022-01-05

## 2022-01-05 ENCOUNTER — TELEPHONE (OUTPATIENT)
Dept: SURGERY | Facility: CLINIC | Age: 42
End: 2022-01-05

## 2022-01-05 ENCOUNTER — HOSPITAL ENCOUNTER (OUTPATIENT)
Facility: CLINIC | Age: 42
End: 2022-01-05
Attending: INTERNAL MEDICINE | Admitting: INTERNAL MEDICINE
Payer: COMMERCIAL

## 2022-01-06 ENCOUNTER — HOME INFUSION (PRE-WILLOW HOME INFUSION) (OUTPATIENT)
Dept: PHARMACY | Facility: CLINIC | Age: 42
End: 2022-01-06
Payer: COMMERCIAL

## 2022-01-10 ENCOUNTER — HOME INFUSION (PRE-WILLOW HOME INFUSION) (OUTPATIENT)
Dept: PHARMACY | Facility: CLINIC | Age: 42
End: 2022-01-10
Payer: COMMERCIAL

## 2022-01-11 ENCOUNTER — MEDICAL CORRESPONDENCE (OUTPATIENT)
Dept: HEALTH INFORMATION MANAGEMENT | Facility: CLINIC | Age: 42
End: 2022-01-11
Payer: COMMERCIAL

## 2022-01-11 ENCOUNTER — HOME INFUSION (PRE-WILLOW HOME INFUSION) (OUTPATIENT)
Dept: PHARMACY | Facility: CLINIC | Age: 42
End: 2022-01-11

## 2022-01-12 ENCOUNTER — HOME INFUSION (PRE-WILLOW HOME INFUSION) (OUTPATIENT)
Dept: PHARMACY | Facility: CLINIC | Age: 42
End: 2022-01-12
Payer: COMMERCIAL

## 2022-01-13 ENCOUNTER — APPOINTMENT (OUTPATIENT)
Dept: CT IMAGING | Facility: CLINIC | Age: 42
End: 2022-01-13
Attending: EMERGENCY MEDICINE
Payer: COMMERCIAL

## 2022-01-13 ENCOUNTER — HOSPITAL ENCOUNTER (INPATIENT)
Facility: CLINIC | Age: 42
LOS: 5 days | Discharge: HOME HEALTH | End: 2022-01-18
Attending: EMERGENCY MEDICINE | Admitting: HOSPITALIST
Payer: COMMERCIAL

## 2022-01-13 DIAGNOSIS — K56.609 SMALL BOWEL OBSTRUCTION (H): ICD-10-CM

## 2022-01-13 DIAGNOSIS — C18.1 MALIGNANT NEOPLASM OF APPENDIX (H): ICD-10-CM

## 2022-01-13 DIAGNOSIS — E87.6 HYPOKALEMIA: Primary | ICD-10-CM

## 2022-01-13 DIAGNOSIS — K56.609 SBO (SMALL BOWEL OBSTRUCTION) (H): ICD-10-CM

## 2022-01-13 LAB
ALBUMIN SERPL-MCNC: 3 G/DL (ref 3.4–5)
ALP SERPL-CCNC: 104 U/L (ref 40–150)
ALT SERPL W P-5'-P-CCNC: 117 U/L (ref 0–50)
ANION GAP SERPL CALCULATED.3IONS-SCNC: 5 MMOL/L (ref 3–14)
AST SERPL W P-5'-P-CCNC: 85 U/L (ref 0–45)
BASOPHILS # BLD AUTO: 0 10E3/UL (ref 0–0.2)
BASOPHILS NFR BLD AUTO: 0 %
BILIRUB SERPL-MCNC: 0.6 MG/DL (ref 0.2–1.3)
BUN SERPL-MCNC: 13 MG/DL (ref 7–30)
CALCIUM SERPL-MCNC: 9.1 MG/DL (ref 8.5–10.1)
CHLORIDE BLD-SCNC: 106 MMOL/L (ref 94–109)
CO2 SERPL-SCNC: 27 MMOL/L (ref 20–32)
CREAT SERPL-MCNC: 0.79 MG/DL (ref 0.52–1.04)
EOSINOPHIL # BLD AUTO: 0 10E3/UL (ref 0–0.7)
EOSINOPHIL NFR BLD AUTO: 1 %
ERYTHROCYTE [DISTWIDTH] IN BLOOD BY AUTOMATED COUNT: 14.4 % (ref 10–15)
GFR SERPL CREATININE-BSD FRML MDRD: >90 ML/MIN/1.73M2
GLUCOSE BLD-MCNC: 100 MG/DL (ref 70–99)
HCT VFR BLD AUTO: 37.8 % (ref 35–47)
HGB BLD-MCNC: 12.1 G/DL (ref 11.7–15.7)
IMM GRANULOCYTES # BLD: 0 10E3/UL
IMM GRANULOCYTES NFR BLD: 0 %
INR PPP: 1.02 (ref 0.85–1.15)
LIPASE SERPL-CCNC: 93 U/L (ref 73–393)
LYMPHOCYTES # BLD AUTO: 1.2 10E3/UL (ref 0.8–5.3)
LYMPHOCYTES NFR BLD AUTO: 15 %
MCH RBC QN AUTO: 26.7 PG (ref 26.5–33)
MCHC RBC AUTO-ENTMCNC: 32 G/DL (ref 31.5–36.5)
MCV RBC AUTO: 83 FL (ref 78–100)
MONOCYTES # BLD AUTO: 0.4 10E3/UL (ref 0–1.3)
MONOCYTES NFR BLD AUTO: 5 %
NEUTROPHILS # BLD AUTO: 6.4 10E3/UL (ref 1.6–8.3)
NEUTROPHILS NFR BLD AUTO: 79 %
NRBC # BLD AUTO: 0 10E3/UL
NRBC BLD AUTO-RTO: 0 /100
PLATELET # BLD AUTO: 244 10E3/UL (ref 150–450)
POTASSIUM BLD-SCNC: 3.3 MMOL/L (ref 3.4–5.3)
PROT SERPL-MCNC: 7 G/DL (ref 6.8–8.8)
RBC # BLD AUTO: 4.54 10E6/UL (ref 3.8–5.2)
SODIUM SERPL-SCNC: 138 MMOL/L (ref 133–144)
WBC # BLD AUTO: 8.1 10E3/UL (ref 4–11)

## 2022-01-13 PROCEDURE — 36415 COLL VENOUS BLD VENIPUNCTURE: CPT | Performed by: EMERGENCY MEDICINE

## 2022-01-13 PROCEDURE — 96374 THER/PROPH/DIAG INJ IV PUSH: CPT | Mod: 59

## 2022-01-13 PROCEDURE — 87635 SARS-COV-2 COVID-19 AMP PRB: CPT | Performed by: EMERGENCY MEDICINE

## 2022-01-13 PROCEDURE — 83690 ASSAY OF LIPASE: CPT | Performed by: EMERGENCY MEDICINE

## 2022-01-13 PROCEDURE — 83735 ASSAY OF MAGNESIUM: CPT | Performed by: HOSPITALIST

## 2022-01-13 PROCEDURE — 81003 URINALYSIS AUTO W/O SCOPE: CPT | Performed by: EMERGENCY MEDICINE

## 2022-01-13 PROCEDURE — 85610 PROTHROMBIN TIME: CPT | Performed by: EMERGENCY MEDICINE

## 2022-01-13 PROCEDURE — 96376 TX/PRO/DX INJ SAME DRUG ADON: CPT

## 2022-01-13 PROCEDURE — 250N000011 HC RX IP 250 OP 636: Performed by: EMERGENCY MEDICINE

## 2022-01-13 PROCEDURE — 250N000009 HC RX 250: Performed by: EMERGENCY MEDICINE

## 2022-01-13 PROCEDURE — 82040 ASSAY OF SERUM ALBUMIN: CPT | Performed by: EMERGENCY MEDICINE

## 2022-01-13 PROCEDURE — C9803 HOPD COVID-19 SPEC COLLECT: HCPCS

## 2022-01-13 PROCEDURE — 99285 EMERGENCY DEPT VISIT HI MDM: CPT | Mod: 25

## 2022-01-13 PROCEDURE — 80053 COMPREHEN METABOLIC PANEL: CPT | Performed by: EMERGENCY MEDICINE

## 2022-01-13 PROCEDURE — 74177 CT ABD & PELVIS W/CONTRAST: CPT

## 2022-01-13 PROCEDURE — 120N000001 HC R&B MED SURG/OB

## 2022-01-13 PROCEDURE — 96375 TX/PRO/DX INJ NEW DRUG ADDON: CPT

## 2022-01-13 PROCEDURE — 96361 HYDRATE IV INFUSION ADD-ON: CPT

## 2022-01-13 PROCEDURE — 85025 COMPLETE CBC W/AUTO DIFF WBC: CPT | Performed by: EMERGENCY MEDICINE

## 2022-01-13 PROCEDURE — 99223 1ST HOSP IP/OBS HIGH 75: CPT | Mod: AI | Performed by: HOSPITALIST

## 2022-01-13 PROCEDURE — 258N000003 HC RX IP 258 OP 636: Performed by: EMERGENCY MEDICINE

## 2022-01-13 RX ORDER — ONDANSETRON 2 MG/ML
4 INJECTION INTRAMUSCULAR; INTRAVENOUS EVERY 30 MIN PRN
Status: COMPLETED | OUTPATIENT
Start: 2022-01-13 | End: 2022-01-14

## 2022-01-13 RX ORDER — IOPAMIDOL 755 MG/ML
74 INJECTION, SOLUTION INTRAVASCULAR ONCE
Status: COMPLETED | OUTPATIENT
Start: 2022-01-13 | End: 2022-01-13

## 2022-01-13 RX ORDER — HYDROMORPHONE HYDROCHLORIDE 1 MG/ML
0.5 INJECTION, SOLUTION INTRAMUSCULAR; INTRAVENOUS; SUBCUTANEOUS
Status: COMPLETED | OUTPATIENT
Start: 2022-01-13 | End: 2022-01-14

## 2022-01-13 RX ADMIN — HYDROMORPHONE HYDROCHLORIDE 0.5 MG: 1 INJECTION, SOLUTION INTRAMUSCULAR; INTRAVENOUS; SUBCUTANEOUS at 22:23

## 2022-01-13 RX ADMIN — IOPAMIDOL 74 ML: 755 INJECTION, SOLUTION INTRAVENOUS at 22:38

## 2022-01-13 RX ADMIN — HYDROMORPHONE HYDROCHLORIDE 0.5 MG: 1 INJECTION, SOLUTION INTRAMUSCULAR; INTRAVENOUS; SUBCUTANEOUS at 20:51

## 2022-01-13 RX ADMIN — ONDANSETRON 4 MG: 2 INJECTION INTRAMUSCULAR; INTRAVENOUS at 20:49

## 2022-01-13 RX ADMIN — SODIUM CHLORIDE 61 ML: 900 INJECTION INTRAVENOUS at 22:38

## 2022-01-13 RX ADMIN — SODIUM CHLORIDE 1000 ML: 9 INJECTION, SOLUTION INTRAVENOUS at 20:48

## 2022-01-13 RX ADMIN — ONDANSETRON 4 MG: 2 INJECTION INTRAMUSCULAR; INTRAVENOUS at 22:21

## 2022-01-13 ASSESSMENT — ENCOUNTER SYMPTOMS
VOMITING: 1
NAUSEA: 1

## 2022-01-13 ASSESSMENT — MIFFLIN-ST. JEOR: SCORE: 1295.04

## 2022-01-14 ENCOUNTER — HOME INFUSION (PRE-WILLOW HOME INFUSION) (OUTPATIENT)
Dept: PHARMACY | Facility: CLINIC | Age: 42
End: 2022-01-14

## 2022-01-14 PROBLEM — E87.6 HYPOKALEMIA: Status: ACTIVE | Noted: 2022-01-14

## 2022-01-14 PROBLEM — E83.42 HYPOMAGNESEMIA: Status: ACTIVE | Noted: 2022-01-14

## 2022-01-14 PROBLEM — C56.3 OVARIAN CANCER, BILATERAL (H): Status: RESOLVED | Noted: 2020-07-13 | Resolved: 2022-01-14

## 2022-01-14 PROBLEM — Z98.890 POST-OPERATIVE STATE: Status: RESOLVED | Noted: 2020-06-12 | Resolved: 2022-01-14

## 2022-01-14 PROBLEM — R19.00 PELVIC MASS IN FEMALE: Status: RESOLVED | Noted: 2020-07-27 | Resolved: 2022-01-14

## 2022-01-14 PROBLEM — K56.609 SMALL INTESTINE OBSTRUCTION (H): Status: RESOLVED | Noted: 2021-12-12 | Resolved: 2022-01-14

## 2022-01-14 PROBLEM — K56.609 SMALL BOWEL OBSTRUCTION (H): Status: RESOLVED | Noted: 2021-12-12 | Resolved: 2022-01-14

## 2022-01-14 PROBLEM — N94.89 ADNEXAL MASS: Status: RESOLVED | Noted: 2020-06-13 | Resolved: 2022-01-14

## 2022-01-14 LAB
ALBUMIN UR-MCNC: NEGATIVE MG/DL
ANION GAP SERPL CALCULATED.3IONS-SCNC: 4 MMOL/L (ref 3–14)
APPEARANCE UR: CLEAR
BASOPHILS # BLD AUTO: 0 10E3/UL (ref 0–0.2)
BASOPHILS NFR BLD AUTO: 0 %
BILIRUB UR QL STRIP: NEGATIVE
BUN SERPL-MCNC: 10 MG/DL (ref 7–30)
CALCIUM SERPL-MCNC: 8.2 MG/DL (ref 8.5–10.1)
CHLORIDE BLD-SCNC: 111 MMOL/L (ref 94–109)
CO2 SERPL-SCNC: 26 MMOL/L (ref 20–32)
COLOR UR AUTO: ABNORMAL
CREAT SERPL-MCNC: 0.79 MG/DL (ref 0.52–1.04)
EOSINOPHIL # BLD AUTO: 0 10E3/UL (ref 0–0.7)
EOSINOPHIL NFR BLD AUTO: 1 %
ERYTHROCYTE [DISTWIDTH] IN BLOOD BY AUTOMATED COUNT: 14.5 % (ref 10–15)
GFR SERPL CREATININE-BSD FRML MDRD: >90 ML/MIN/1.73M2
GLUCOSE BLD-MCNC: 96 MG/DL (ref 70–99)
GLUCOSE BLDC GLUCOMTR-MCNC: 141 MG/DL (ref 70–99)
GLUCOSE UR STRIP-MCNC: NEGATIVE MG/DL
HCT VFR BLD AUTO: 31.1 % (ref 35–47)
HGB BLD-MCNC: 10 G/DL (ref 11.7–15.7)
HGB UR QL STRIP: NEGATIVE
IMM GRANULOCYTES # BLD: 0 10E3/UL
IMM GRANULOCYTES NFR BLD: 0 %
KETONES UR STRIP-MCNC: NEGATIVE MG/DL
LEUKOCYTE ESTERASE UR QL STRIP: ABNORMAL
LYMPHOCYTES # BLD AUTO: 1.2 10E3/UL (ref 0.8–5.3)
LYMPHOCYTES NFR BLD AUTO: 20 %
MAGNESIUM SERPL-MCNC: 1.5 MG/DL (ref 1.6–2.3)
MAGNESIUM SERPL-MCNC: 1.6 MG/DL (ref 1.6–2.3)
MCH RBC QN AUTO: 27 PG (ref 26.5–33)
MCHC RBC AUTO-ENTMCNC: 32.2 G/DL (ref 31.5–36.5)
MCV RBC AUTO: 84 FL (ref 78–100)
MONOCYTES # BLD AUTO: 0.3 10E3/UL (ref 0–1.3)
MONOCYTES NFR BLD AUTO: 5 %
MUCOUS THREADS #/AREA URNS LPF: PRESENT /LPF
NEUTROPHILS # BLD AUTO: 4.6 10E3/UL (ref 1.6–8.3)
NEUTROPHILS NFR BLD AUTO: 74 %
NITRATE UR QL: NEGATIVE
NRBC # BLD AUTO: 0 10E3/UL
NRBC BLD AUTO-RTO: 0 /100
PH UR STRIP: 5 [PH] (ref 5–7)
PHOSPHATE SERPL-MCNC: 4.3 MG/DL (ref 2.5–4.5)
PLATELET # BLD AUTO: 213 10E3/UL (ref 150–450)
POTASSIUM BLD-SCNC: 3.8 MMOL/L (ref 3.4–5.3)
POTASSIUM BLD-SCNC: 3.8 MMOL/L (ref 3.4–5.3)
RBC # BLD AUTO: 3.7 10E6/UL (ref 3.8–5.2)
RBC URINE: 0 /HPF
SARS-COV-2 RNA RESP QL NAA+PROBE: NEGATIVE
SODIUM SERPL-SCNC: 141 MMOL/L (ref 133–144)
SP GR UR STRIP: 1.01 (ref 1–1.03)
SQUAMOUS EPITHELIAL: 1 /HPF
UROBILINOGEN UR STRIP-MCNC: NORMAL MG/DL
WBC # BLD AUTO: 6.2 10E3/UL (ref 4–11)
WBC URINE: 7 /HPF

## 2022-01-14 PROCEDURE — 84100 ASSAY OF PHOSPHORUS: CPT | Performed by: INTERNAL MEDICINE

## 2022-01-14 PROCEDURE — 250N000011 HC RX IP 250 OP 636: Performed by: HOSPITALIST

## 2022-01-14 PROCEDURE — 250N000009 HC RX 250: Performed by: HOSPITALIST

## 2022-01-14 PROCEDURE — 120N000001 HC R&B MED SURG/OB

## 2022-01-14 PROCEDURE — 250N000009 HC RX 250: Performed by: NURSE PRACTITIONER

## 2022-01-14 PROCEDURE — C9113 INJ PANTOPRAZOLE SODIUM, VIA: HCPCS | Performed by: HOSPITALIST

## 2022-01-14 PROCEDURE — 99024 POSTOP FOLLOW-UP VISIT: CPT | Performed by: SURGERY

## 2022-01-14 PROCEDURE — 84132 ASSAY OF SERUM POTASSIUM: CPT | Performed by: HOSPITALIST

## 2022-01-14 PROCEDURE — 99233 SBSQ HOSP IP/OBS HIGH 50: CPT | Performed by: INTERNAL MEDICINE

## 2022-01-14 PROCEDURE — 250N000011 HC RX IP 250 OP 636: Performed by: EMERGENCY MEDICINE

## 2022-01-14 PROCEDURE — 250N000013 HC RX MED GY IP 250 OP 250 PS 637: Performed by: INTERNAL MEDICINE

## 2022-01-14 PROCEDURE — 83735 ASSAY OF MAGNESIUM: CPT | Performed by: HOSPITALIST

## 2022-01-14 PROCEDURE — 80048 BASIC METABOLIC PNL TOTAL CA: CPT | Performed by: HOSPITALIST

## 2022-01-14 PROCEDURE — 250N000011 HC RX IP 250 OP 636: Performed by: INTERNAL MEDICINE

## 2022-01-14 PROCEDURE — 85025 COMPLETE CBC W/AUTO DIFF WBC: CPT | Performed by: HOSPITALIST

## 2022-01-14 RX ORDER — PROCHLORPERAZINE MALEATE 10 MG
10 TABLET ORAL EVERY 6 HOURS PRN
Status: DISCONTINUED | OUTPATIENT
Start: 2022-01-14 | End: 2022-01-18 | Stop reason: HOSPADM

## 2022-01-14 RX ORDER — HYDROMORPHONE HCL IN WATER/PF 6 MG/30 ML
.2-.3 PATIENT CONTROLLED ANALGESIA SYRINGE INTRAVENOUS
Status: DISCONTINUED | OUTPATIENT
Start: 2022-01-14 | End: 2022-01-18 | Stop reason: HOSPADM

## 2022-01-14 RX ORDER — DEXTROSE MONOHYDRATE 25 G/50ML
25-50 INJECTION, SOLUTION INTRAVENOUS
Status: DISCONTINUED | OUTPATIENT
Start: 2022-01-14 | End: 2022-01-18 | Stop reason: HOSPADM

## 2022-01-14 RX ORDER — DOCUSATE SODIUM 100 MG/1
200 CAPSULE, LIQUID FILLED ORAL 2 TIMES DAILY
Status: DISCONTINUED | OUTPATIENT
Start: 2022-01-14 | End: 2022-01-18 | Stop reason: HOSPADM

## 2022-01-14 RX ORDER — NALOXONE HYDROCHLORIDE 0.4 MG/ML
0.4 INJECTION, SOLUTION INTRAMUSCULAR; INTRAVENOUS; SUBCUTANEOUS
Status: DISCONTINUED | OUTPATIENT
Start: 2022-01-14 | End: 2022-01-18 | Stop reason: HOSPADM

## 2022-01-14 RX ORDER — ONDANSETRON 4 MG/1
4 TABLET, ORALLY DISINTEGRATING ORAL EVERY 6 HOURS PRN
Status: DISCONTINUED | OUTPATIENT
Start: 2022-01-14 | End: 2022-01-18 | Stop reason: HOSPADM

## 2022-01-14 RX ORDER — MAGNESIUM OXIDE 400 MG/1
400 TABLET ORAL 3 TIMES DAILY
Status: DISCONTINUED | OUTPATIENT
Start: 2022-01-14 | End: 2022-01-14

## 2022-01-14 RX ORDER — ACETAMINOPHEN 325 MG/1
650 TABLET ORAL EVERY 6 HOURS PRN
Status: DISCONTINUED | OUTPATIENT
Start: 2022-01-14 | End: 2022-01-18 | Stop reason: HOSPADM

## 2022-01-14 RX ORDER — NALOXONE HYDROCHLORIDE 0.4 MG/ML
0.2 INJECTION, SOLUTION INTRAMUSCULAR; INTRAVENOUS; SUBCUTANEOUS
Status: DISCONTINUED | OUTPATIENT
Start: 2022-01-14 | End: 2022-01-18 | Stop reason: HOSPADM

## 2022-01-14 RX ORDER — HYDROXYZINE HYDROCHLORIDE 25 MG/1
25 TABLET, FILM COATED ORAL EVERY 4 HOURS PRN
Status: DISCONTINUED | OUTPATIENT
Start: 2022-01-14 | End: 2022-01-18 | Stop reason: HOSPADM

## 2022-01-14 RX ORDER — MAGNESIUM SULFATE HEPTAHYDRATE 40 MG/ML
2 INJECTION, SOLUTION INTRAVENOUS ONCE
Status: COMPLETED | OUTPATIENT
Start: 2022-01-14 | End: 2022-01-14

## 2022-01-14 RX ORDER — ONDANSETRON 2 MG/ML
4 INJECTION INTRAMUSCULAR; INTRAVENOUS EVERY 6 HOURS PRN
Status: DISCONTINUED | OUTPATIENT
Start: 2022-01-14 | End: 2022-01-18 | Stop reason: HOSPADM

## 2022-01-14 RX ORDER — DEXTROSE MONOHYDRATE 100 MG/ML
INJECTION, SOLUTION INTRAVENOUS CONTINUOUS PRN
Status: DISCONTINUED | OUTPATIENT
Start: 2022-01-14 | End: 2022-01-18 | Stop reason: HOSPADM

## 2022-01-14 RX ORDER — NICOTINE POLACRILEX 4 MG
15-30 LOZENGE BUCCAL
Status: DISCONTINUED | OUTPATIENT
Start: 2022-01-14 | End: 2022-01-18 | Stop reason: HOSPADM

## 2022-01-14 RX ORDER — ACETAMINOPHEN 650 MG/1
650 SUPPOSITORY RECTAL EVERY 6 HOURS PRN
Status: DISCONTINUED | OUTPATIENT
Start: 2022-01-14 | End: 2022-01-18 | Stop reason: HOSPADM

## 2022-01-14 RX ORDER — SODIUM CHLORIDE AND POTASSIUM CHLORIDE 150; 900 MG/100ML; MG/100ML
INJECTION, SOLUTION INTRAVENOUS CONTINUOUS
Status: ACTIVE | OUTPATIENT
Start: 2022-01-14 | End: 2022-01-14

## 2022-01-14 RX ORDER — OXYCODONE HYDROCHLORIDE 5 MG/1
5 TABLET ORAL EVERY 6 HOURS PRN
COMMUNITY

## 2022-01-14 RX ORDER — LIDOCAINE 40 MG/G
CREAM TOPICAL
Status: DISCONTINUED | OUTPATIENT
Start: 2022-01-14 | End: 2022-01-18 | Stop reason: HOSPADM

## 2022-01-14 RX ORDER — HYDROXYZINE HYDROCHLORIDE 25 MG/1
50 TABLET, FILM COATED ORAL EVERY 4 HOURS PRN
Status: DISCONTINUED | OUTPATIENT
Start: 2022-01-14 | End: 2022-01-18 | Stop reason: HOSPADM

## 2022-01-14 RX ORDER — PROCHLORPERAZINE 25 MG
25 SUPPOSITORY, RECTAL RECTAL EVERY 12 HOURS PRN
Status: DISCONTINUED | OUTPATIENT
Start: 2022-01-14 | End: 2022-01-18 | Stop reason: HOSPADM

## 2022-01-14 RX ADMIN — HYDROXYZINE HYDROCHLORIDE 25 MG: 25 TABLET ORAL at 17:21

## 2022-01-14 RX ADMIN — HYDROMORPHONE HYDROCHLORIDE 0.2 MG: 0.2 INJECTION, SOLUTION INTRAMUSCULAR; INTRAVENOUS; SUBCUTANEOUS at 13:16

## 2022-01-14 RX ADMIN — PROCHLORPERAZINE EDISYLATE 10 MG: 5 INJECTION INTRAMUSCULAR; INTRAVENOUS at 15:22

## 2022-01-14 RX ADMIN — DOCUSATE SODIUM 200 MG: 100 CAPSULE, LIQUID FILLED ORAL at 21:00

## 2022-01-14 RX ADMIN — I.V. FAT EMULSION 250 ML: 20 EMULSION INTRAVENOUS at 20:53

## 2022-01-14 RX ADMIN — HYDROMORPHONE HYDROCHLORIDE 0.2 MG: 0.2 INJECTION, SOLUTION INTRAMUSCULAR; INTRAVENOUS; SUBCUTANEOUS at 15:15

## 2022-01-14 RX ADMIN — MAGNESIUM SULFATE HEPTAHYDRATE: 500 INJECTION, SOLUTION INTRAMUSCULAR; INTRAVENOUS at 20:49

## 2022-01-14 RX ADMIN — MAGNESIUM SULFATE HEPTAHYDRATE 2 G: 40 INJECTION, SOLUTION INTRAVENOUS at 17:22

## 2022-01-14 RX ADMIN — HYDROMORPHONE HYDROCHLORIDE 0.5 MG: 1 INJECTION, SOLUTION INTRAMUSCULAR; INTRAVENOUS; SUBCUTANEOUS at 01:40

## 2022-01-14 RX ADMIN — HYDROMORPHONE HYDROCHLORIDE 0.2 MG: 0.2 INJECTION, SOLUTION INTRAMUSCULAR; INTRAVENOUS; SUBCUTANEOUS at 18:29

## 2022-01-14 RX ADMIN — PANTOPRAZOLE SODIUM 40 MG: 40 INJECTION, POWDER, FOR SOLUTION INTRAVENOUS at 08:25

## 2022-01-14 RX ADMIN — ONDANSETRON 4 MG: 2 INJECTION INTRAMUSCULAR; INTRAVENOUS at 01:47

## 2022-01-14 RX ADMIN — PROCHLORPERAZINE EDISYLATE 10 MG: 5 INJECTION INTRAMUSCULAR; INTRAVENOUS at 05:43

## 2022-01-14 RX ADMIN — HYDROMORPHONE HYDROCHLORIDE 0.2 MG: 0.2 INJECTION, SOLUTION INTRAMUSCULAR; INTRAVENOUS; SUBCUTANEOUS at 23:46

## 2022-01-14 RX ADMIN — POTASSIUM CHLORIDE AND SODIUM CHLORIDE: 900; 150 INJECTION, SOLUTION INTRAVENOUS at 01:57

## 2022-01-14 RX ADMIN — HYDROMORPHONE HYDROCHLORIDE 0.3 MG: 0.2 INJECTION, SOLUTION INTRAMUSCULAR; INTRAVENOUS; SUBCUTANEOUS at 05:43

## 2022-01-14 RX ADMIN — MAGNESIUM SULFATE HEPTAHYDRATE 2 G: 40 INJECTION, SOLUTION INTRAVENOUS at 04:48

## 2022-01-14 ASSESSMENT — ACTIVITIES OF DAILY LIVING (ADL)
ADLS_ACUITY_SCORE: 12
HEARING_DIFFICULTY_OR_DEAF: NO
ADLS_ACUITY_SCORE: 9
DIFFICULTY_COMMUNICATING: NO
ADLS_ACUITY_SCORE: 9
WEAR_GLASSES_OR_BLIND: YES
ADLS_ACUITY_SCORE: 7
ADLS_ACUITY_SCORE: 9
ADLS_ACUITY_SCORE: 9
ADLS_ACUITY_SCORE: 7
ADLS_ACUITY_SCORE: 9
ADLS_ACUITY_SCORE: 7
FALL_HISTORY_WITHIN_LAST_SIX_MONTHS: NO
ADLS_ACUITY_SCORE: 7
ADLS_ACUITY_SCORE: 7
ADLS_ACUITY_SCORE: 9
DIFFICULTY_EATING/SWALLOWING: NO
DRESSING/BATHING_DIFFICULTY: NO
ADLS_ACUITY_SCORE: 9
ADLS_ACUITY_SCORE: 9
PATIENT_/_FAMILY_COMMUNICATION_STYLE: SPOKEN LANGUAGE (ENGLISH OR BILINGUAL)
ADLS_ACUITY_SCORE: 9
ADLS_ACUITY_SCORE: 12
ADLS_ACUITY_SCORE: 12
ADLS_ACUITY_SCORE: 7
WHICH_OF_THE_ABOVE_FUNCTIONAL_RISKS_HAD_A_RECENT_ONSET_OR_CHANGE?: AMBULATION;TRANSFERRING
TOILETING_ISSUES: NO
ADLS_ACUITY_SCORE: 9
CONCENTRATING,_REMEMBERING_OR_MAKING_DECISIONS_DIFFICULTY: NO
ADLS_ACUITY_SCORE: 7
ADLS_ACUITY_SCORE: 9
ADLS_ACUITY_SCORE: 9
WALKING_OR_CLIMBING_STAIRS_DIFFICULTY: NO
ADLS_ACUITY_SCORE: 9
ADLS_ACUITY_SCORE: 9
DOING_ERRANDS_INDEPENDENTLY_DIFFICULTY: YES

## 2022-01-14 NOTE — CONSULTS
Consultation    Jefe Blanc MRN# [id number]   YOB: 1980 Age: 41 year old   Date of Admission: 1/13/2022  Requesting physician: Dr. Braun  Reason for consult: Appendiceal cancer; Recurrent SBO           Assessment and Plan:     Ferny is a 41 year old admitted 1/13 with abdominal pain, nausea and vomiting with recurrent small bowel obstruction    Small bowel obstruction  Peritoneal carcinomatosis  Venting G tube  - Was hospitalized recently in [facility] with abdominal pain, nausea and vomiting  - G tube has been venting since she left the hospital last, but there were a few days when there was less output  - Last BM prior to hospitalization at [place], but she is passing some flatus  - At home had only been drinking broth and juice, but only very few sips  - Was using very little oxycodone at home, maybe 5 mg oxycodone daily  - Left [place] 1/13 and came to [facility] due to ongoing symptoms  - CT abdomen and pelvis 1/13 showed fluid filled, dilated small bowel with several areas of transition in the lower abdomen and pelvis. Areas of wall thickening in the region of the transition as well as adjacent sigmoid colon and slight tethering of loops. Small amount of ascites.  - Surgery consulted. No plans for surgical intervention.  - Consider G tube to LIS, but it is currently draining well.  - She really wants to have some small sips of juice which I do not have a problem with given the overall severity of the whole picture. This is more of a comfort/quality of life issue at this time.  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 I think we are in a challenging predicament. She is not in very good shape to get chemotherapy, but that is the one thing that might reduce the carcinomatosis to help reduce the likelihood of ongoing obstruction. Treatment could be fraught with complications such as significant side effects  including possible perforation. I talked with her about the possibility that window of opportunity for palliative treatment might be closing rapidly. I let her know that I worry about how we might care for her going forward given the high likelihood of ongoing obstruction issues. I am going to talk with Dr. Feliciano about treatment options, but also reach out to Palliative Care to think about future steps in trying to reduce Ferny's suffering.    Addison MACDONALD, CNP  [facility]  [phone] (office); [phone] (cell)              Chief Complaint:   Nausea & Vomiting (Nausea and vomiting for the last week. Denies diarrhea. Recent SBO and g-tube plaement. )         History of Present Illness:   Ferny is a 41 year old admitted 1/13 with abdominal pain, nausea and vomiting with recurrent small bowel obstruction    Ferny was recently hospitalized with recurrent small bowel obstruction at [facility]. I spoke with the PA's caring for her. They were treating her conservatively with IV fluids, IV analgesics and IV antiemetics. They did get a CT which is available in Care Everywhere. She was admitted 1/10 and discharged 1/13 at her and her family's request.     She has a venting G tube which was placed during her last hospitalization here at [facility] on 12/12/2021. She underwent urgent exploratory laparotomy with Dr. Ibrahim on 12/12. The operative report describes innumerable sizable mesenteric and small bowel masses. There were metastatic deposits along the surface of the liver, transverse colon and anterior surface of the stomach. There was a closed loop tethered in the pelvis behind the bladder and pubic symphysis. The loop was viable, but not completely obstructed. A venting G tube was placed. She was started on TPN. Eventually she was discharged on 12/20. She was back 12/24 at [facility] with postoperative pain. She was seen 1/1 in the ED at [place], then again 1/10 when she was  admitted.    Prior to hospitalization in [place] she was on TPN with sips of liquids like juice and broth. Her G tube has good output at home and she just had left it open to gravity. Prior to going to [place] there were a few days when she noted less gastrostomy tube output. Her pain and nausea symptoms increased during that time. Her last bowel movement was prior to hospitalization on 1/10. She is passing flatus and has been, but not much.     We tried to get her transferred, but there were not beds for direct admission from hospital to hospital. She and her family requested discharge to home 1/13.    Ferny came to [facility] ED with ongoing abdominal pain and nausea. Another CT abdomen and pelvis was completed. This showed ongoing bowel obstruction. She was admitted for pain control with ongoing obstruction. We were asked to see her due to her underlying malignancy.     Ferny was scheduled to start palliative chemotherapy this past week, but this was held due to hospitalization in [place].    This morning her pain is persistent, but maybe a little better. Nausea is fairly controlled. She wants to have some sips of juice. We talked about the possibility that she might not have the option for chemotherapy due to ongoing symptoms.     ONCOLOGY HISTORY:  1. 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  PUS:  Uterus 9 x 5.8 cm with two small fibroids.  Endometrial lining 8 mm.  IUD in good position.  Right ovary 4.6 x 5.5 x 3.8 cm with 2.1 x 1.5 x 1.6 cm complex cyst with debris within it in the right ovary.  Left ovary normal.    - 06/12/20 Presented again to [facility] ED with 1 day h/o LLQ cramping.  Found to have diffuse lower abdominal tenderness R > L.  PUS:  Uterus 9.6 x 5.9 x 5.6 cm with a few fibroids.  Endometrium 6 mm.  IUD  present.  Right ovary 7.1 x 3.7 x 3.8 cm with homgeneous hypoechoic lesions measuring 1.6 x 1.3 cm . and larger, homogeneous, hyperechoic lesions measuring 3 x 3.7 x 4 cm with internal vascularity.  Left ovary measure 6.8 x 7.3 x 4.2 cm with homogeneous, hyperechoic lobulated mass measuring 6.8 x 5.9 x 3.6 cm (possible endometriomas, hemorrhagic cysts or mature teratomas).  Few uterine fibroids.  Myometrium heterogeneous and endometrium ill defined.    - 06/12/20 Taken to OR by Dr. Odilia Nieves and Dr. Arelis Montero.  Laparoscopic left ovarian detorsion, LSO, right ovarian cystectomy, removal of right solid ovarian mass, washings.  Cysts on right side ruptured. Pathology:  Left ovary with goblet cell adenocarcinoma, favor metastatic.  Right ovarian mass with goblet cell adenocarcinoma, favor metastatic.  Right ovarian cyst no evidence of malignancy.  Washing negative.    - 06/25/20 CEA = 1.2 ng/mL    = 57 U/mL    - 06/29/20 PET/CT: Left ovarian cystic lesion measures 5.1 cm with no significant associated hypermetabolic activity. 2.1 cm hypermetabolic focus within the endometrium near the fundus on the right could be physiologic, although endometrial neoplasm cannot entirely be excluded from this appearance. IUD appears to be in good position. Otherwise GILL.    -06/30/20 Endoscopy - normal.   Colonoscopy - normal     -07/27/20 Diagnostic laparoscopy, exploratory laparotomy, supracervical hysterectomy, right salpingooophorectomy, omentectomy and tumor debulking and Open right colectomy (Dr. Prakash Ernst).     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  Other sites of involvement included the omentum as well as right colon.  4 of 16 lymph nodes showed  metastatic disease.  Lymphovascular and perineural invasion was identified.  Mismatch repair proteins were intact    -08/03/2020 Inpatient consult with Dr. Gunderson. Discussed chemotherapy with FOLFOX. Also discussed HIPEC    -08/04/2020 Second opinion consult with tumor sample for mutation analysis.  Mutation studies performed on the tumor showed the following results:  No mutation in KRAS, NRAS or BRAF.  Further, HER-2/keke was not amplified  MSI was stable and mismatch repair proteins were proficient.  Tumor mutation burden was low, 1 mutation/MB   NTRK 1/2/3 fusions were not detected.  PIK3CA mutation not detected  PTEN mutation positive    Other findings include PDL 1 expression negative.  ARID1A mutation present (p.*)  BRIP1 mutation (p.A349P)    -08/13/2020 CT A/P: Pelvic fluid collection has decreased in size from 8/4/2020. The catheter was well-positioned within the remaining fluid. As a small amount of fluid remains, the catheter was not removed    -09/09/2020 cycle 1 of FOLFOX  -09/23/2020 cycle 2  -10/07/2020 cycle 3  -10/21/2020 cycle 4 Oxaliplatinum held due to prior hypersensitivity reaction    - CT scan on 10/30/2020 shows no evidence of residual or recurrent disease.  Postoperative changes were again noted.    - 11/04/2020 cycle 5 No change from cycle 4  - 11/18/2020 cycle 6  - 12/02/2020 cycle 7  - 12/16/2020 cycle 8  - 12/29/2020 cycle 9    - CT scan on 01/06/2021 shows no evidence of residual or recurrent disease.    - 01/12/2021 cycle 10  - 01/27/2021 cycle 11  - 02/10/2021 cycle 12    - CT of the chest, abdomen pelvis on 3/8/2021 shows no evidence of residual or recurrent disease.  - CT CAP 9/13/21 shows no obvious metastatic disease in chest, abdomen or pelvis.   - Upper GI endoscopy by GI in December 2021 showed no evidence of an gastric outlet obstruction.  - Development of small bowel obstruction leading to abdominal exploration with lysis of adhesions and placement of gastrostomy tube on  "2021.  Findings at the time of surgery showed diffuse intraperitoneal metastatic disease which was not resectable.         Physical Exam:   Vitals were reviewed  Blood pressure 114/74, pulse 96, temperature (!) 96.3  F (35.7  C), temperature source Oral, resp. rate 17, height 1.575 m (5' 2\"), weight 66.7 kg (147 lb), SpO2 100 %.  Temperatures:  Current - Temp: (!) 96.3  F (35.7  C); Max - Temp  Av  F (36.1  C)  Min: 96.3  F (35.7  C)  Max: 97.6  F (36.4  C)  Respiration range: Resp  Av  Min: 16  Max: 18  Pulse range: Pulse  Av  Min: 60  Max: 108  Blood pressure range: Systolic (24hrs), Av , Min:114 , Max:155   ; Diastolic (24hrs), Av, Min:62, Max:114    Pulse oximetry range: SpO2  Av.1 %  Min: 96 %  Max: 100 %    Intake/Output Summary (Last 24 hours) at 2022 1055  Last data filed at 2022 0432  Gross per 24 hour   Intake --   Output 300 ml   Net -300 ml     GENERAL: No acute distress. She is tired.  SKIN: No rashes or jaundice.  HEENT: Normocephalic, atraumatic. Eyes anicteric. Oropharynx is dry.  HEART: Regular rate and rhythm with no murmurs.  LUNGS: Clear bilaterally.  ABDOMEN: Soft. Hypoactive bowel sounds. G tube. No rebound or guarding.  EXTREMITIES: No clubbing, cyanosis, or edema.  MENTAL: Alert and oriented to person, place, and time.  NEURO: Cranial nerves II through XII grossly intact with no focal motor or sensory deficits.              Past Medical History:   I have reviewed this patient's past medical history  Past Medical History:   Diagnosis Date     Anxiety      Cancer of appendix metastatic to intra-abdominal lymph node (H)      Diverticulitis     of esophagus     Ovarian cyst      Small bowel obstruction (H)              Past Surgical History:   I have reviewed this patient's past surgical history  Past Surgical History:   Procedure Laterality Date     ADENOIDECTOMY       COLECTOMY WITHOUT COLOSTOMY N/A 2020    Procedure: RIGHT HEMICOLECTOMY;  " Surgeon: Eliane Nicole MD;  Location:  OR     ENT SURGERY      tubes     HYSTERECTOMY SUPRACERVICAL, BILATERAL SALPINGO-OOPHORECTOMY, COMBINED N/A 7/27/2020    Procedure: DIAGNOSTIC LAPAROSCOPY, EXPLORATORY LAPAROTOMY, SUPRACERVICAL HYSTERECTOMY, RIGHT SALPINGO-OOPHORECTOMY, OMENTECTOMY;  Surgeon: Eliane Nicole MD;  Location:  OR     IR CHEST PORT PLACEMENT > 5 YRS OF AGE  9/2/2020     LAPAROSCOPIC CYSTECTOMY OVARIAN (BENIGN) Bilateral 6/12/2020    Procedure: Diagnostic laparoscopy, possible ovarian torsion, bilateral ovarian cysts.;  Surgeon: Odilia Nieves MD;  Location:  OR     LAPAROTOMY EXPLORATORY N/A 12/12/2021    Procedure: EXPLORATORY LAPAROTOMY, LYSIS OF ADHESIONS, PLACEMENT OF G TUBE, AND PERITONEAL BIOPSY;  Surgeon: Mata Ibrahim MD;  Location:  OR     post partum tubal ligation                 Social History:   I have reviewed this patient's social history  Social History     Tobacco Use     Smoking status: Never Smoker     Smokeless tobacco: Never Used   Substance Use Topics     Alcohol use: Yes     Comment: rare             Family History:   I have reviewed this patient's family history  Family History   Problem Relation Age of Onset     Endometriosis Mother      Fibroids Mother      Endometriosis Sister              Allergies:     Allergies   Allergen Reactions     Azithromycin      Erythromycin GI Disturbance and Other (See Comments)     Abdominal Pain  Vomiting       Lactose Nausea and Vomiting             Medications:   I have reviewed this patient's current medications  Medications Prior to Admission   Medication Sig Dispense Refill Last Dose     oxyCODONE (ROXICODONE) 5 MG tablet Take 5 mg by mouth every 6 hours as needed for severe pain        ondansetron (ZOFRAN-ODT) 4 MG ODT tab Take 1-2 tablets (4-8 mg) by mouth every 8 hours as needed for nausea 30 tablet 3      parenteral nutrition - PTA/DISCHARGE ORDER The TPN formula will print on the After Visit  Summary Report. 1 each 0      Current Facility-Administered Medications Ordered in Epic   Medication Dose Route Frequency Last Rate Last Admin     0.9% sodium chloride + KCl 20 mEq/L infusion   Intravenous Continuous 100 mL/hr at 01/14/22 0900 Rate Verify at 01/14/22 0900     acetaminophen (TYLENOL) tablet 650 mg  650 mg Oral Q6H PRN        Or     acetaminophen (TYLENOL) Suppository 650 mg  650 mg Rectal Q6H PRN         HYDROmorphone (DILAUDID) injection 0.2-0.3 mg  0.2-0.3 mg Intravenous Q2H PRN   0.3 mg at 01/14/22 0543     lidocaine (LMX4) cream   Topical Q1H PRN         lidocaine 1 % 0.1-1 mL  0.1-1 mL Other Q1H PRN         melatonin tablet 5 mg  5 mg Oral At Bedtime PRN         naloxone (NARCAN) injection 0.2 mg  0.2 mg Intravenous Q2 Min PRN        Or     naloxone (NARCAN) injection 0.4 mg  0.4 mg Intravenous Q2 Min PRN        Or     naloxone (NARCAN) injection 0.2 mg  0.2 mg Intramuscular Q2 Min PRN        Or     naloxone (NARCAN) injection 0.4 mg  0.4 mg Intramuscular Q2 Min PRN         ondansetron (ZOFRAN-ODT) ODT tab 4 mg  4 mg Oral Q6H PRN        Or     ondansetron (ZOFRAN) injection 4 mg  4 mg Intravenous Q6H PRN         pantoprazole (PROTONIX) IV push injection 40 mg  40 mg Intravenous Daily with breakfast   40 mg at 01/14/22 0825     prochlorperazine (COMPAZINE) injection 10 mg  10 mg Intravenous Q6H PRN   10 mg at 01/14/22 0543    Or     prochlorperazine (COMPAZINE) tablet 10 mg  10 mg Oral Q6H PRN        Or     prochlorperazine (COMPAZINE) suppository 25 mg  25 mg Rectal Q12H PRN         sodium chloride (PF) 0.9% PF flush 3 mL  3 mL Intracatheter Q8H         sodium chloride (PF) 0.9% PF flush 3 mL  3 mL Intracatheter q1 min prn         No current [facility]-ordered outpatient medications on file.             Review of Systems:     The 10 point Review of Systems is negative other than noted in the HPI.            Data:   Data   Results for orders placed or performed during the hospital encounter of 01/13/22  (from the past 24 hour(s))   CBC with platelets differential    Narrative    The following orders were created for panel order CBC with platelets differential.  Procedure                               Abnormality         Status                     ---------                               -----------         ------                     CBC with platelets and d...[[id number]]                      Final result                 Please view results for these tests on the individual orders.   INR   Result Value Ref Range    INR 1.02 0.85 - 1.15   Comprehensive metabolic panel   Result Value Ref Range    Sodium 138 133 - 144 mmol/L    Potassium 3.3 (L) 3.4 - 5.3 mmol/L    Chloride 106 94 - 109 mmol/L    Carbon Dioxide (CO2) 27 20 - 32 mmol/L    Anion Gap 5 3 - 14 mmol/L    Urea Nitrogen 13 7 - 30 mg/dL    Creatinine 0.79 0.52 - 1.04 mg/dL    Calcium 9.1 8.5 - 10.1 mg/dL    Glucose 100 (H) 70 - 99 mg/dL    Alkaline Phosphatase 104 40 - 150 U/L    AST 85 (H) 0 - 45 U/L     (H) 0 - 50 U/L    Protein Total 7.0 6.8 - 8.8 g/dL    Albumin 3.0 (L) 3.4 - 5.0 g/dL    Bilirubin Total 0.6 0.2 - 1.3 mg/dL    GFR Estimate >90 >60 mL/min/1.73m2   Lipase   Result Value Ref Range    Lipase 93 73 - 393 U/L   CBC with platelets and differential   Result Value Ref Range    WBC Count 8.1 4.0 - 11.0 10e3/uL    RBC Count 4.54 3.80 - 5.20 10e6/uL    Hemoglobin 12.1 11.7 - 15.7 g/dL    Hematocrit 37.8 35.0 - 47.0 %    MCV 83 78 - 100 fL    MCH 26.7 26.5 - 33.0 pg    MCHC 32.0 31.5 - 36.5 g/dL    RDW 14.4 10.0 - 15.0 %    Platelet Count 244 150 - 450 10e3/uL    % Neutrophils 79 %    % Lymphocytes 15 %    % Monocytes 5 %    % Eosinophils 1 %    % Basophils 0 %    % Immature Granulocytes 0 %    NRBCs per 100 WBC 0 <1 /100    Absolute Neutrophils 6.4 1.6 - 8.3 10e3/uL    Absolute Lymphocytes 1.2 0.8 - 5.3 10e3/uL    Absolute Monocytes 0.4 0.0 - 1.3 10e3/uL    Absolute Eosinophils 0.0 0.0 - 0.7 10e3/uL    Absolute Basophils 0.0 0.0 - 0.2 10e3/uL     Absolute Immature Granulocytes 0.0 <=0.4 10e3/uL    Absolute NRBCs 0.0 10e3/uL   Magnesium   Result Value Ref Range    Magnesium 1.6 1.6 - 2.3 mg/dL   CT Abdomen Pelvis w Contrast    Narrative    EXAM: CT ABDOMEN PELVIS W CONTRAST  LOCATION: [facility]  DATE/TIME: 1/13/2022 9:39 PM    INDICATION: Abdominal pain, acute, nonlocalized  COMPARISON: 12/25/2021  TECHNIQUE: CT scan of the abdomen and pelvis was performed following injection of IV contrast. Multiplanar reformats were obtained. Dose reduction techniques were used.  CONTRAST: 74 mL Isovue-370    FINDINGS:   LOWER CHEST: Small hiatal hernia.    HEPATOBILIARY: Small hepatic cyst. Sludge or cholelithiasis not excluded within the gallbladder.    PANCREAS: Normal.    SPLEEN: Normal.    ADRENAL GLANDS: Normal.    KIDNEYS/BLADDER: Subcentimeter left renal hypodensity too small to characterize.    BOWEL: Percutaneous gastrostomy. Wall thickening versus collapse of the distal stomach. Fluid-filled, dilated small bowel. Several areas of transition are seen within the lower abdomen and pelvis. Areas of wall thickening in the region of transition   series 4 images 128 and 139-149. There is wall thickening of the adjacent sigmoid colon and slight tethering of loops. Small amount of ascites. Presacral edema. Appendectomy.    LYMPH NODES: Normal.    VASCULATURE: Unremarkable.    PELVIC ORGANS: Stable.    MUSCULOSKELETAL: Sclerotic change sacroiliac joints.      Impression    IMPRESSION:   1.  Small bowel obstruction with several areas of transition in the lower abdomen and upper pelvis. Wall thickening in the region of transition. There is also wall thickening of adjacent sigmoid colon with tethering of loops of bowel. Uncertain if   obstruction is secondary to underlying peritoneal carcinomatosis with implants and/or adhesions.  2.  Correlate for proctocolitis.  3.  Small amount of ascites.   Asymptomatic COVID-19 Virus (Coronavirus) by PCR  Nasopharyngeal    Specimen: Nasopharyngeal; Swab   Result Value Ref Range    SARS CoV2 PCR Negative Negative    Narrative    Testing was performed using the aditi  SARS-CoV-2 & Influenza A/B Assay on the aditi  Michael[employer]  System.  This test should be ordered for the detection of SARS-COV-2 in individuals who meet SARS-CoV-2 clinical and/or epidemiological criteria. Test performance is unknown in asymptomatic patients.  This test is for in vitro diagnostic use under the FDA EUA for laboratories certified under CLIA to perform moderate and/or high complexity testing. This test has not been FDA cleared or approved.  A negative test does not rule out the presence of PCR inhibitors in the specimen or target RNA in concentration below the limit of detection for the assay. The possibility of a false negative should be considered if the patient's recent exposure or clinical presentation suggests COVID-19.  [facility] [email or web address] are certified under the Clinical Laboratory Improvement Amendments of 1988 (CLIA-88) as qualified to perform moderate and/or high complexity laboratory testing.   UA with Microscopic reflex to Culture    Specimen: Urine, Clean Catch   Result Value Ref Range    Color Urine Light Yellow Colorless, Straw, Light Yellow, Yellow    Appearance Urine Clear Clear    Glucose Urine Negative Negative mg/dL    Bilirubin Urine Negative Negative    Ketones Urine Negative Negative mg/dL    Specific Gravity Urine 1.015 1.003 - 1.035    Blood Urine Negative Negative    pH Urine 5.0 5.0 - 7.0    Protein Albumin Urine Negative Negative mg/dL    Urobilinogen Urine Normal Normal, 2.0 mg/dL    Nitrite Urine Negative Negative    Leukocyte Esterase Urine Small (A) Negative    Mucus Urine Present (A) None Seen /LPF    RBC Urine 0 <=2 /HPF    WBC Urine 7 (H) <=5 /HPF    Squamous Epithelials Urine 1 <=1 /HPF    Narrative    Urine Culture not indicated   Surgery General IP Consult: Patient to be seen: Routine within 24 hrs;  Recurrent SBO; Consultant may enter orders: Yes; Requesting provider? Hospitalist (if different from attending physician)    Narrative    Alondra Estrada PA-C     1/14/2022  9:46 AM  [facility] General Surgery Consultation    Jefe Blanc MRN# [id number]   YOB: 1980 Age: 41 year old      Date of Admission:  1/13/2022  Date of Consult: 1/14/2022         Assessment and Plan:   Patient is a 41 year old female admitted for recurrent small   bowel obstruction in the setting of stage 4 high grade goblet   cell carcinoid tumor of the appendix with history of surgical   debulking and chemotherapy. Recently s/p ex laparotomy with lysis   of adhesions and G-tube placement on 12/13. Intraoperatively   there was diffuse disease with carcinomatosis and involvement of   the transverse colon, stomach, liver, small bowel, mesentery, and   peritoneum.    PLAN:  - G tube was placed for venting purposes and do not initiate tube   feeds. Patient remains TPN dependent due to carcinomatosis of   virtually the entire abdominal compartment.   - If vomiting returns, can trial G-tube to intermittent wall   suction for better returns. Otherwise keep to gravity 100% of the   time.  - Okay for ice chips and sips of water. Do not return to further   liquids until BM.  - There is no role for surgical intervention in this patient.  - Agree with oncology consult and likely need for palliative   consult in this setting.         Requesting Physician:      Neel Braun MD        Chief Complaint:     Chief Complaint   Patient presents with     Nausea & Vomiting     Nausea and vomiting for the last week. Denies diarrhea. Recent   SBO and g-tube plaement.           History of Present Illness:   Jefe Blanc is a 41 year old female who presented to ED last   night for nausea and abdominal pain. She is known to the surgical   group for recent recurrent/malignant small bowel obstruction in   the setting of  "stage 4 high grade goblet cell carcinoid tumor of   the appendix with history of surgical debulking and chemotherapy.   On 12/13, she had s/p ex laparotomy with lysis of adhesions and   G-tube placement. Intraoperatively there was diffuse disease with   carcinomatosis and involvement of the transverse colon, stomach,   liver, small bowel, mesentery, and peritoneum. She has been   having clear liquids at home for comfort since this time. She has   NOT been getting nutrition through G tube and remains TPN   dependent. She denies changes in liquid diet over the past couple   days and states G tube has remained to gravity. The nausea   started two day ago and was associated with vomiting and   abdominal pain. The pain felt like cramping and wasn't relieved   by heat pack. The pain did worsen with more liquids. She believes   last BM was two days ago and she did have flatus this morning. CT   abdomen and pelvis in the ED visualized small bowel obstruction   with several areas of transition in the lower abdomen and upper   pelvis as well as wall thickening in the region of transition.   There is also wall thickening of adjacent sigmoid colon with   tethering of loops of bowel.           Physical Exam:   Blood pressure 114/74, pulse 96, temperature (!) 96.3  F (35.7    C), temperature source Oral, resp. rate 17, height 1.575 m (5'   2\"), weight 66.7 kg (147 lb), SpO2 100 %.  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)        Diverticulitis     of esophagus     Ovarian cyst      Small bowel obstruction (H)             Past Surgical History:     Past Surgical History:   Procedure Laterality Date     ADENOIDECTOMY       COLECTOMY WITHOUT COLOSTOMY N/A 7/27/2020    Procedure: RIGHT HEMICOLECTOMY;  Surgeon: Eliane Nicole MD;  Location:  OR     ENT SURGERY      tubes     HYSTERECTOMY SUPRACERVICAL, BILATERAL SALPINGO-OOPHORECTOMY,   COMBINED N/A 7/27/2020    Procedure: DIAGNOSTIC LAPAROSCOPY, EXPLORATORY LAPAROTOMY,   SUPRACERVICAL HYSTERECTOMY, RIGHT SALPINGO-OOPHORECTOMY,   OMENTECTOMY;  Surgeon: Eliane Nicole MD;  Location:  OR       IR CHEST PORT PLACEMENT > 5 YRS OF AGE  9/2/2020     LAPAROSCOPIC CYSTECTOMY OVARIAN (BENIGN) Bilateral 6/12/2020    Procedure: Diagnostic laparoscopy, possible ovarian torsion,   bilateral ovarian cysts.;  Surgeon: Odilia Nieves MD;    Location:  OR     LAPAROTOMY EXPLORATORY N/A 12/12/2021    Procedure: EXPLORATORY LAPAROTOMY, LYSIS OF ADHESIONS, PLACEMENT   OF G TUBE, AND PERITONEAL BIOPSY;  Surgeon: Mata Ibrahim MD;  Location:  OR     post partum tubal ligation              Current Medications:           pantoprazole (PROTONIX) IV  40 mg Intravenous Daily with   breakfast     sodium chloride (PF)  3 mL Intracatheter Q8H       acetaminophen **OR** acetaminophen, HYDROmorphone, lidocaine 4%,   lidocaine (buffered or not buffered), melatonin, naloxone **OR**   naloxone **OR** naloxone **OR** naloxone, ondansetron **OR**   ondansetron, prochlorperazine **OR** prochlorperazine **OR**   prochlorperazine, sodium chloride (PF)         Home Medications:     Prior to Admission medications    Medication Sig Last Dose Taking? Auth Provider   oxyCODONE (ROXICODONE) 5 MG tablet Take 5 mg by mouth every 6   hours as needed for severe pain  Yes Reported, Patient   ondansetron (ZOFRAN-ODT) 4 MG  ODT tab Take 1-2 tablets (4-8 mg)   by mouth every 8 hours as needed for nausea   Rambo Magaña PA-C   parenteral nutrition - PTA/DISCHARGE ORDER The TPN formula will   print on the After Visit Summary Report.   Rambo Magaña PA-C            Allergies:     Allergies   Allergen Reactions     Azithromycin      Erythromycin GI Disturbance and Other (See Comments)     Abdominal Pain  Vomiting       Lactose Nausea and Vomiting            Family History:     Family History   Problem Relation Age of Onset     Endometriosis Mother      Fibroids Mother      Endometriosis Sister            Social History:   Jefe Blanc  reports that she has never smoked. She has   never used smokeless tobacco. She reports current alcohol use.   She reports that she does not use drugs.          Review of Systems:   The 12 point Review of Systems is negative other than noted in   the HPI.         Labs/Imaging   All new lab and imaging data was reviewed.   Recent Labs   Lab 01/14/22 0248 01/13/22 2047   WBC 6.2 8.1   HGB 10.0* 12.1   HCT 31.1* 37.8   MCV 84 83    244     Recent Labs   Lab 01/14/22 0248 01/13/22 2047    138   POTASSIUM 3.8  3.8 3.3*   CHLORIDE 111* 106   CO2 26 27   ANIONGAP 4 5   GLC 96 100*   BUN 10 13   CR 0.79 0.79   GFRESTIMATED >90 >90   IRON 8.2* 9.1   MAG 1.5* 1.6   PROTTOTAL  --  7.0   ALBUMIN  --  3.0*   BILITOTAL  --  0.6   ALKPHOS  --  104   AST  --  85*   ALT  --  117*     I have personally reviewed the imaging studies-   CT ABD AND PELVIS  IMPRESSION:   1.  Small bowel obstruction with several areas of transition in   the lower abdomen and upper pelvis. Wall thickening in the region   of transition. There is also wall thickening of adjacent sigmoid   colon with tethering of loops of bowel. Uncertain if   obstruction is secondary to underlying peritoneal carcinomatosis   with implants and/or adhesions.  2.  Correlate for proctocolitis.  3.  Small amount of ascites.      Alondra  ALISTAIR Estrada    45 minutes spent on date of the encounter doing patient visit,   chart review, and documentation.     Basic metabolic panel   Result Value Ref Range    Sodium 141 133 - 144 mmol/L    Potassium 3.8 3.4 - 5.3 mmol/L    Chloride 111 (H) 94 - 109 mmol/L    Carbon Dioxide (CO2) 26 20 - 32 mmol/L    Anion Gap 4 3 - 14 mmol/L    Urea Nitrogen 10 7 - 30 mg/dL    Creatinine 0.79 0.52 - 1.04 mg/dL    Calcium 8.2 (L) 8.5 - 10.1 mg/dL    Glucose 96 70 - 99 mg/dL    GFR Estimate >90 >60 mL/min/1.73m2   CBC with platelets differential    Narrative    The following orders were created for panel order CBC with platelets differential.  Procedure                               Abnormality         Status                     ---------                               -----------         ------                     CBC with platelets and d...[[id number]]  Abnormal            Final result                 Please view results for these tests on the individual orders.   Potassium   Result Value Ref Range    Potassium 3.8 3.4 - 5.3 mmol/L   Magnesium   Result Value Ref Range    Magnesium 1.5 (L) 1.6 - 2.3 mg/dL   CBC with platelets and differential   Result Value Ref Range    WBC Count 6.2 4.0 - 11.0 10e3/uL    RBC Count 3.70 (L) 3.80 - 5.20 10e6/uL    Hemoglobin 10.0 (L) 11.7 - 15.7 g/dL    Hematocrit 31.1 (L) 35.0 - 47.0 %    MCV 84 78 - 100 fL    MCH 27.0 26.5 - 33.0 pg    MCHC 32.2 31.5 - 36.5 g/dL    RDW 14.5 10.0 - 15.0 %    Platelet Count 213 150 - 450 10e3/uL    % Neutrophils 74 %    % Lymphocytes 20 %    % Monocytes 5 %    % Eosinophils 1 %    % Basophils 0 %    % Immature Granulocytes 0 %    NRBCs per 100 WBC 0 <1 /100    Absolute Neutrophils 4.6 1.6 - 8.3 10e3/uL    Absolute Lymphocytes 1.2 0.8 - 5.3 10e3/uL    Absolute Monocytes 0.3 0.0 - 1.3 10e3/uL    Absolute Eosinophils 0.0 0.0 - 0.7 10e3/uL    Absolute Basophils 0.0 0.0 - 0.2 10e3/uL    Absolute Immature Granulocytes 0.0 <=0.4 10e3/uL    Absolute NRBCs 0.0  10e3/uL

## 2022-01-14 NOTE — PLAN OF CARE
Shift Summary: Pt was admitted to the floor around 0130. Pt was in stable condition on transfer. Vitals and baseline assessment were completed. Pt complained of pain and nausea and was given some medications for that. She was able to void and ambulate with minimal assist. She states she lives at home with her family. Pt was very tired and had a hard time answering the admission questions.     Gen. Information:  A&Ox4  SBA (gen. Weakness from pain + /IV pole + G-tube gravity bag)   Right Chest Port.  IVF NS/20meq K+ @ 100  NPO (ice chips ok)   Pain - Dilauded 0.5mg given 1x  Lungs - n/a  Heart - n/a   GI - maroon foul swelling fluid from G-tube   - n/a  Neuro - n/a  Skin - Port site, surgical wound site, G-tube    Last Vitals:   Problem: Adult Inpatient Plan of Care  Goal: Plan of Care Review  Outcome: No Change  Goal: Patient-Specific Goal (Individualized)  Outcome: No Change  Goal: Absence of Hospital-Acquired Illness or Injury  Outcome: No Change  Intervention: Identify and Manage Fall Risk  Recent Flowsheet Documentation  Taken 1/14/2022 0204 by Gómez Montenegro, RN  Safety Promotion/Fall Prevention:    activity supervised    assistive device/personal items within reach  Intervention: Prevent Skin Injury  Recent Flowsheet Documentation  Taken 1/14/2022 0204 by Gómez Montenegro, RN  Body Position: position changed independently  Goal: Optimal Comfort and Wellbeing  Outcome: No Change  Goal: Readiness for Transition of Care  Outcome: No Change  Intervention: Mutually Develop Transition Plan  Recent Flowsheet Documentation  Taken 1/14/2022 0206 by Gómez Montenegro, RN  Equipment Currently Used at Home: none

## 2022-01-14 NOTE — PROGRESS NOTES
RECEIVING UNIT ED HANDOFF REVIEW    ED Nurse Handoff Report was reviewed by: Carrie Lopez RN on January 14, 2022 at 12:58 AM

## 2022-01-14 NOTE — ED PROVIDER NOTES
History   Chief Complaint:  Nausea and Vomiting     The history is provided by the patient.      Jefe Blanc is a 41 year old female with history of ovarian cancer, metastatic appendiceal carcinoma, and bowel obstruction who presents with nausea and vomiting. She has had a recent G-tube placement. For the past three days she has had nausea and vomiting. Before this time she was feeling otherwise fine. She states that during this time her tube has also been open. She has been having regular bowel movements with normal urination as well. She is not Covid vaccinated.     Review of Systems   Gastrointestinal: Positive for nausea and vomiting.   All other systems reviewed and are negative.    Allergies:  Azithromycin  Erythromycin  Lactose    Medications:  Ondansetron   parenteral nutrition - PTA/DISCHARGE ORDER    Past Medical History:     Anxiety   Diverticulitis   Ovarian cancer (H)   Ovarian cyst   Post-operative state  Adnexal mass  Pelvic mass in female  Small bowel obstruction (H)  Small intestine obstruction (H)      Past Surgical History:    Adenoidectomy  Colectomy without colostomy  Right hemicolectomy  Hysterectomy supracervical, bilateral salpingo-oophorectomy, combined  Diagnostic laparoscopy, exploratory laparotomy, omentectomy  IR chest port placement  Laparoscopic cystectomy ovarian  Post partum tubal ligation    Family History:    Endometriosis  Fibroids    Social History:  Patient Brought in with family member  PCP: Kimberly Huizar   Lives with family    Physical Exam     Patient Vitals for the past 24 hrs:   BP Temp Temp src Pulse Resp SpO2 Height Weight   01/13/22 2309 -- -- -- -- -- 100 % -- --   01/13/22 2308 117/86 -- -- 95 -- -- -- --   01/13/22 2145 -- -- -- -- -- 100 % -- --   01/13/22 2130 -- -- -- -- -- 100 % -- --   01/13/22 2115 -- -- -- -- -- 99 % -- --   01/13/22 2100 -- -- -- -- -- 98 % -- --   01/13/22 2040 (!) 132/93 -- -- 108 -- -- -- --   01/13/22 2014 (!) 155/114 97.2  F  "(36.2  C) Temporal 106 18 100 % 1.575 m (5' 2\") 66.7 kg (147 lb)       Physical Exam    Constitutional: Middle aged black woman laying on her right side trying to vomit into emesis bag.  HENT: No signs of trauma.   Eyes: EOM are normal. Pupils are equal, round, and reactive to light.   Neck: Normal range of motion. No JVD present. No cervical adenopathy.  Cardiovascular: Regular rhythm.  Exam reveals no gallop and no friction rub. 1+ femoral pulses.  No murmur heard.  Pulmonary/Chest: Bilateral breath sounds normal. No wheezes, rhonchi or rales. Port right anterior chest.  Abdominal: Soft. Diffuse tenderness. No rebound. Quite bowel sounds. Gastrostomy tube upper abdomen.  Musculoskeletal: No edema. No tenderness.   Lymphadenopathy: No lymphadenopathy.   Neurological: Alert and oriented to person, place, and time. Normal strength. Coordination normal.   Skin: Skin is warm and dry. No rash noted. No erythema.     Emergency Department Course     Imaging:  CT Abdomen Pelvis w Contrast   Final Result   IMPRESSION:    1.  Small bowel obstruction with several areas of transition in the lower abdomen and upper pelvis. Wall thickening in the region of transition. There is also wall thickening of adjacent sigmoid colon with tethering of loops of bowel. Uncertain if    obstruction is secondary to underlying peritoneal carcinomatosis with implants and/or adhesions.   2.  Correlate for proctocolitis.   3.  Small amount of ascites.        Report per radiology    Laboratory:  Labs Ordered and Resulted from Time of ED Arrival to Time of ED Departure   COMPREHENSIVE METABOLIC PANEL - Abnormal       Result Value    Sodium 138      Potassium 3.3 (*)     Chloride 106      Carbon Dioxide (CO2) 27      Anion Gap 5      Urea Nitrogen 13      Creatinine 0.79      Calcium 9.1      Glucose 100 (*)     Alkaline Phosphatase 104      AST 85 (*)      (*)     Protein Total 7.0      Albumin 3.0 (*)     Bilirubin Total 0.6      GFR Estimate " ">90     INR - Normal    INR 1.02     LIPASE - Normal    Lipase 93     CBC WITH PLATELETS AND DIFFERENTIAL    WBC Count 8.1      RBC Count 4.54      Hemoglobin 12.1      Hematocrit 37.8      MCV 83      MCH 26.7      MCHC 32.0      RDW 14.4      Platelet Count 244      % Neutrophils 79      % Lymphocytes 15      % Monocytes 5      % Eosinophils 1      % Basophils 0      % Immature Granulocytes 0      NRBCs per 100 WBC 0      Absolute Neutrophils 6.4      Absolute Lymphocytes 1.2      Absolute Monocytes 0.4      Absolute Eosinophils 0.0      Absolute Basophils 0.0      Absolute Immature Granulocytes 0.0      Absolute NRBCs 0.0     ROUTINE UA WITH MICROSCOPIC REFLEX TO CULTURE   COVID-19 VIRUS (CORONAVIRUS) BY PCR      Emergency Department Course:    Reviewed:  I reviewed nursing notes, vitals, past medical history and Care Everywhere    Assessments/Consults:  ED Course as of 01/13/22 2358   Thu Jan 13, 2022 2023 Obtained history and examined the patient as noted above.      Interventions:  2048 0.9% sodium chloride BOLUS  2049 Ondansetron Zofran 4 mg, IV  2051 Dilaudid 0.5 mg, IV  2221 Zofran 4 mg, IV  2223 Dilaudid 0.5 mg, IV    Disposition:  The patient was admitted to the hospital under the care of Dr. Whittington.     Impression & Plan         Medical Decision Making:  Jefe Blanc is an unfortunate 41 year old female who has had a recurrence of metastatic appendiceal carcinoma which there is no treatment for. She was just discharged from the hospital with a bowel obstruction. She has a gastrostomy tube in. The last note from the surgeon states, \"If problems reoccur, open the gastrostomy tube to drainage, she has no other surgical intervention available.\"    Patient states over the last two to three days she has been feeling bad with abdominal pain and vomiting. On exam she is curled on her side holding an emesis bag with vomit in it.  Her gastrostomy tube is also draining. She has diffuse abdominal tenderness. " She is not febrile, hypoxic, or hypotensive. Workup includes labs. There is minimal hypokalemia of 3.3. CBC lipase and the other labs are ok. There is some elevation of the liver functions. CT however shows small bowel obstruction with several areas of transition in the lower and upper abdomen. There is also a possibility of proctocolitis in a small amount of sites. Patient has been given IV fluids, pain medication, and nausea medication. She will be admitted for pain control and further therapy as indicated.    Diagnosis:    ICD-10-CM    1. SBO (small bowel obstruction) (H)  K56.609    2. Malignant neoplasm of appendix (H)  C18.1      Scribe Disclosure:  Loki AHUMADA, am serving as a scribe at 8:23 PM on 1/13/2022 to document services personally performed by Duane Smith MD based on my observations and the provider's statements to me.           Duane Smith MD  01/14/22 0001

## 2022-01-14 NOTE — H&P
[facility]    History and Physical  Hospitalist       Date of Admission:  1/13/2022  Date of Service (when I saw the patient): 01/13/22    ASSESSMENT  Jefe Blanc is a markedly pleasant 41 year old woman with past medical history that is most notable for recurrent metastatic appendiceal cancer as well as recent SBO status post exploratory laparotomy and gastrostomy tube placement; who presents with recurrent nausea, vomiting, and abdominal pain, and is found to have acute SBO.    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 More recently she was found several months ago to have suspected recurrent cancer on CT. In 12/2021 she was admitted for acute SBO and peritoneal carcinomatosis to the General Surgery service at [facility]. On 12/13/2021 she underwent exploratory laparotomy with lysis of adhesions, at which time she was found to have cancer recurrence with metastases to the transverse colon, stomach, liver, small bowel, mesentery, and peritoneum. A gastrostomy tube was placed during the surgery and she was eventually discharged and started on home TPN infusions. Reportedly she has an upcoming appointment with her cancer team to discuss candidacy for second line chemotherapy. She saw her surgeon Dr. Ibrahim in follow up on 12/30/2021 at which time it was recommended if her symptoms recurred, to set the G tube to gravity. Now she presents with recurrent abdominal pain, nausea and vomiting and is found on CT tonight to have recurrent acute SBO with several areas of transition in the lower abdomen and upper pelvis. We suspect this SBO to be due or related to her ongoing metastatic intra-abdominal malignancy. Per Dr. Ibrahim's recent  note, she may no longer be a candidate for further surgical interventions.    -- Inpatient. NPO. She requests to try ice chips and this has carefully been ordered. Surgery and Oncology consulted. G tube to gravity. 100 ml/hour NS with KCl ordered overnight. Tylenol, Oxycodone, IV Dilaudid as needed for pain. Anti-emetics as needed.    -- She requests a PICC line be placed in AM rather than that her port site be used    -- Hold tube feeds for now pending surgical assessment this AM    -- If this SBO does not respond to conservative measures, her prognosis may be poor at this point    Hypokalemia: Replacing. Check Magnesium and replace as well.    Rule Out COVID-19 infection  This patient was evaluated during a global COVID-19 pandemic, which necessitated consideration that the patient might be at risk for infection with the SARS-CoV-2 virus that causes COVID-19. Applicable protocols for evaluation were followed during the patient's care. Low suspicion for infection.   -follow up COVID-19 PCR test result  -no current indication for precautions    Chief Complaint   Nausea    History is obtained from the patient and the ED physician whom I have spoken with    History of Present Illness   Jefe Blanc is a markedly pleasant 41 year old woman who presents with nausea. She describes ongoing episodic nausea and vomiting since 9/2021, just prior to eventual discovery of her recurrent appendiceal cancer. She says this episode of nausea began 2 days ago son after she woke up; it was severe and has led to multiple episodes of vomiting, and this episode has not resolved. It is associated with new onset diffusely radiating abdominal pain characterized as cramping. She has tried heat pack at home with minimal relief of symptoms and so eventually came in. She can not recall when her last bowel movement was exactly but thinks it was yesterday. She is not able to pass flatus tonight. The nausea and pain became even more intense  "today and so she came in. She takes no PO food because since 12/2021 this has triggered intense nausea and pain; she has a G tube and gets all nutrition through that. She otherwise denies worsening abdominal distension, or fever, chills, or sweats, or any other acute complaints.    In the ED, Blood pressure 117/86, pulse 95, temperature 97.2  F (36.2  C), temperature source Temporal, resp. rate 18, height 1.575 m (5' 2\"), weight 66.7 kg (147 lb), SpO2 100 %.    CBC and BMP were notable for K 3.3, alb 3.0, , AST 85, Glucose 100, otherwise were within the normal reference range. Lipase was 93. INR was 1.02.    CT abdomen and pelvis showed: \"IMPRESSION:   1.  Small bowel obstruction with several areas of transition in the lower abdomen and upper pelvis. Wall thickening in the region of transition. There is also wall thickening of adjacent sigmoid colon with tethering of loops of bowel. Uncertain if obstruction is secondary to underlying peritoneal carcinomatosis with implants and/or adhesions.  2.  Correlate for proctocolitis.  3.  Small amount of ascites.\"    She was given Dilaudid, Zofran, and IV fluid in the ED.    PHYSICAL EXAM  Blood pressure 117/86, pulse 95, temperature 97.2  F (36.2  C), temperature source Temporal, resp. rate 18, height 1.575 m (5' 2\"), weight 66.7 kg (147 lb), SpO2 100 %.  Constitutional: Alert and oriented to person, place and time; no apparent distress  HEENT: normocephalic moist mucus membranes  Respiratory: lungs clear to auscultation bilaterally  Cardiovascular: regular S1 S2  GI: abdomen soft, diffusely tender and mildly distended, bowel sounds are very faint; left sided G tube site c/d/i; orange colored fluid in the bag  Lymph/Hematologic: no pallor, no cervical lymphadenopathy  Skin: no rash, good turgor  Musculoskeletal: no clubbing, cyanosis or edema; right chest wall port site c/c/di  Neurologic: extra-ocular muscles intact; moves all four extremities  Psychiatric: " appropriate affect, insight and judgment     DVT Prophylaxis: Pneumatic Compression Devices  Code Status: Full Code    Disposition: Expected discharge in 3-5 days    Neel Braun MD    Past Medical History    I have reviewed this patient's medical history and updated it with pertinent information if needed.   Past Medical History:   Diagnosis Date     Anxiety      Cancer of appendix metastatic to intra-abdominal lymph node (H)      Diverticulitis     of esophagus     Ovarian cyst      Small bowel obstruction (H)        Past Surgical History   I have reviewed this patient's surgical history and updated it with pertinent information if needed.  Past Surgical History:   Procedure Laterality Date     ADENOIDECTOMY       COLECTOMY WITHOUT COLOSTOMY N/A 7/27/2020    Procedure: RIGHT HEMICOLECTOMY;  Surgeon: Eliane Nicole MD;  Location:  OR     ENT SURGERY      tubes     HYSTERECTOMY SUPRACERVICAL, BILATERAL SALPINGO-OOPHORECTOMY, COMBINED N/A 7/27/2020    Procedure: DIAGNOSTIC LAPAROSCOPY, EXPLORATORY LAPAROTOMY, SUPRACERVICAL HYSTERECTOMY, RIGHT SALPINGO-OOPHORECTOMY, OMENTECTOMY;  Surgeon: Eliane Nicole MD;  Location:  OR     IR CHEST PORT PLACEMENT > 5 YRS OF AGE  9/2/2020     LAPAROSCOPIC CYSTECTOMY OVARIAN (BENIGN) Bilateral 6/12/2020    Procedure: Diagnostic laparoscopy, possible ovarian torsion, bilateral ovarian cysts.;  Surgeon: Odilia Nieves MD;  Location:  OR     LAPAROTOMY EXPLORATORY N/A 12/12/2021    Procedure: EXPLORATORY LAPAROTOMY, LYSIS OF ADHESIONS, PLACEMENT OF G TUBE, AND PERITONEAL BIOPSY;  Surgeon: Mata Ibrahim MD;  Location:  OR     post partum tubal ligation         Prior to Admission Medications   Prior to Admission Medications   Prescriptions Last Dose Informant Patient Reported? Taking?   ondansetron (ZOFRAN-ODT) 4 MG ODT tab   No No   Sig: Take 1-2 tablets (4-8 mg) by mouth every 8 hours as needed for nausea   parenteral nutrition -  PTA/DISCHARGE ORDER   No No   Sig: The TPN formula will print on the After Visit Summary Report.      Facility-Administered Medications: None     Allergies   Allergies   Allergen Reactions     Azithromycin      Erythromycin GI Disturbance and Other (See Comments)     Abdominal Pain  Vomiting       Lactose Nausea and Vomiting       Social History   I have reviewed this patient's social history and updated it with pertinent information if needed. Jefe Blanc  reports that she has never smoked. She has never used smokeless tobacco. She reports current alcohol use. She reports that she does not use drugs.    Family History   Family history assessed and, except as above, is non-contributory.    Family History   Problem Relation Age of Onset     Endometriosis Mother      Fibroids Mother      Endometriosis Sister        Review of Systems   The 10 point Review of Systems is negative other than noted in the HPI or here.     Primary Care Physician   MARISELA Murphy    Data   Labs Ordered and Resulted from Time of ED Arrival to Time of ED Departure   COMPREHENSIVE METABOLIC PANEL - Abnormal       Result Value    Sodium 138      Potassium 3.3 (*)     Chloride 106      Carbon Dioxide (CO2) 27      Anion Gap 5      Urea Nitrogen 13      Creatinine 0.79      Calcium 9.1      Glucose 100 (*)     Alkaline Phosphatase 104      AST 85 (*)      (*)     Protein Total 7.0      Albumin 3.0 (*)     Bilirubin Total 0.6      GFR Estimate >90     INR - Normal    INR 1.02     LIPASE - Normal    Lipase 93     CBC WITH PLATELETS AND DIFFERENTIAL    WBC Count 8.1      RBC Count 4.54      Hemoglobin 12.1      Hematocrit 37.8      MCV 83      MCH 26.7      MCHC 32.0      RDW 14.4      Platelet Count 244      % Neutrophils 79      % Lymphocytes 15      % Monocytes 5      % Eosinophils 1      % Basophils 0      % Immature Granulocytes 0      NRBCs per 100 WBC 0      Absolute Neutrophils 6.4      Absolute Lymphocytes 1.2      Absolute  Monocytes 0.4      Absolute Eosinophils 0.0      Absolute Basophils 0.0      Absolute Immature Granulocytes 0.0      Absolute NRBCs 0.0     ROUTINE UA WITH MICROSCOPIC REFLEX TO CULTURE   COVID-19 VIRUS (CORONAVIRUS) BY PCR       Data reviewed today:  I personally reviewed the abdominal CT image(s) showing SBO.    Recent Results (from the past 24 hour(s))   CT Abdomen Pelvis w Contrast    Narrative    EXAM: CT ABDOMEN PELVIS W CONTRAST  LOCATION: [facility]  DATE/TIME: 1/13/2022 9:39 PM    INDICATION: Abdominal pain, acute, nonlocalized  COMPARISON: 12/25/2021  TECHNIQUE: CT scan of the abdomen and pelvis was performed following injection of IV contrast. Multiplanar reformats were obtained. Dose reduction techniques were used.  CONTRAST: 74 mL Isovue-370    FINDINGS:   LOWER CHEST: Small hiatal hernia.    HEPATOBILIARY: Small hepatic cyst. Sludge or cholelithiasis not excluded within the gallbladder.    PANCREAS: Normal.    SPLEEN: Normal.    ADRENAL GLANDS: Normal.    KIDNEYS/BLADDER: Subcentimeter left renal hypodensity too small to characterize.    BOWEL: Percutaneous gastrostomy. Wall thickening versus collapse of the distal stomach. Fluid-filled, dilated small bowel. Several areas of transition are seen within the lower abdomen and pelvis. Areas of wall thickening in the region of transition   series 4 images 128 and 139-149. There is wall thickening of the adjacent sigmoid colon and slight tethering of loops. Small amount of ascites. Presacral edema. Appendectomy.    LYMPH NODES: Normal.    VASCULATURE: Unremarkable.    PELVIC ORGANS: Stable.    MUSCULOSKELETAL: Sclerotic change sacroiliac joints.      Impression    IMPRESSION:   1.  Small bowel obstruction with several areas of transition in the lower abdomen and upper pelvis. Wall thickening in the region of transition. There is also wall thickening of adjacent sigmoid colon with tethering of loops of bowel. Uncertain if    obstruction is secondary to underlying peritoneal carcinomatosis with implants and/or adhesions.  2.  Correlate for proctocolitis.  3.  Small amount of ascites.

## 2022-01-14 NOTE — PHARMACY-CONSULT NOTE
1/14 Pt on home cyclic TPN. Formula per [facility]:    2050 ml/day over 12 hrs, 1 hr tapers up & down nightly:  AA 85g  Dext 250g  Na 59  K 50  Ca 7  Mg 8  Phos 25  Cl:Acetate=1:2  MVI 10ml, Trace 1ml    Lipids: 20% 250ml added 5 days/wk (M-F)    Will resume above orders and follow labs, replace electrolytes as needed.    Zenobia Garcia PharmD

## 2022-01-14 NOTE — PROGRESS NOTES
[facility]    Hospitalist Progress Note    Assessment & Plan   41 year old female who was admitted on 1/13/2022 with recurrent SBO related to Carcinomatosis    Impression:   Principal Problem:    Recurrent SBO (small bowel obstruction) -- S/P venting G-tube    -- Oncology ordered TPN (would advise continuous as 12 hrs on then off creates hyperglycemia with no nutritional advantages)     Active Problems:    Appendiceal Adeno CA w Carcinomatosis -- S/P Right hemicolectomy, SHELDON/BSO 7/27/20   -- followed my [facility], given her poor functional status not sure if additional chemo an option, will wait for their final opinion      Plan:  Will add Colace to help with obstructive symptoms, and Vistaril to help with nausea and pain, and will work with patient to get the best outcome.  Did call daughter and she had no questions, and left message for mother as well and asked for her input.     DVT Prophylaxis: Pneumatic Compression Devices  Code Status: Full Code    Disposition: Expected discharge in 3 days (may need to set up home TPN if patient chooses to go that route).      Tio Rodriguez  Pager [phone]  Cell Phone [phone]  Text Page (7am to 6pm)  (35 min total)    Interval History   Reports moderate abdominal pain, when asked if she was hoping for more chemotherapy she did not express desire either way.     Physical Exam   Temp: 98.8  F (37.1  C) Temp src: Oral BP: 122/84 Pulse: 95   Resp: 16 SpO2: 99 % O2 Device: None (Room air)    Vitals:    01/13/22 2014   Weight: 66.7 kg (147 lb)     Vital Signs with Ranges  Temp:  [96.3  F (35.7  C)-98.8  F (37.1  C)] 98.8  F (37.1  C)  Pulse:  [] 95  Resp:  [16-18] 16  BP: (114-155)/() 122/84  SpO2:  [96 %-100 %] 99 %  I/O last 3 completed shifts:  In: -   Out: 300 [Stool:300]    # Pain Assessment:  Current Pain Score 1/14/2022   Patient currently in pain? sleeping, patient not able to self report   Pain score (0-10) -   -  Jefe is experiencing pain due to SBO. Pain management was discussed and the plan was created in a collaborative fashion.  Jefe's response to the current recommendations: engaged  - Please see the plan for pain management as documented above    Constitutional: Awake, alert, cooperative, no apparent distress -- lying in bed, covers partially over her head.   Respiratory: Clear to auscultation bilaterally, no crackles or wheezing  Cardiovascular: Regular rate and rhythm, normal S1 and S2, and no murmur noted  GI: bowels sounds present but decreased, moderate epigastric tenderness, no distension noted  Extrem: No calf tenderness, no ankle edema  Neuro: Ox3, no focal motor or sensory deficits    Medications     dextrose       parenteral nutrition - ADULT compounded formula CYCLE         docusate sodium  200 mg Oral BID     insulin aspart  1-6 Units Subcutaneous BID     lipids  250 mL Intravenous Once per day on Mon Tue Wed Thu Fri     pantoprazole (PROTONIX) IV  40 mg Intravenous Daily with breakfast     sodium chloride (PF)  3 mL Intracatheter Q8H       Data   Recent Labs   Lab 01/14/22  0248 01/13/22  2047   WBC 6.2 8.1   HGB 10.0* 12.1   MCV 84 83    244   INR  --  1.02    138   POTASSIUM 3.8  3.8 3.3*   CHLORIDE 111* 106   CO2 26 27   BUN 10 13   CR 0.79 0.79   ANIONGAP 4 5   IRON 8.2* 9.1   GLC 96 100*   ALBUMIN  --  3.0*   PROTTOTAL  --  7.0   BILITOTAL  --  0.6   ALKPHOS  --  104   ALT  --  117*   AST  --  85*   LIPASE  --  93       Imaging:   Recent Results (from the past 24 hour(s))   CT Abdomen Pelvis w Contrast    Narrative    EXAM: CT ABDOMEN PELVIS W CONTRAST  LOCATION: [facility]  DATE/TIME: 1/13/2022 9:39 PM    INDICATION: Abdominal pain, acute, nonlocalized  COMPARISON: 12/25/2021  TECHNIQUE: CT scan of the abdomen and pelvis was performed following injection of IV contrast. Multiplanar reformats were obtained. Dose reduction techniques were  used.  CONTRAST: 74 mL Isovue-370    FINDINGS:   LOWER CHEST: Small hiatal hernia.    HEPATOBILIARY: Small hepatic cyst. Sludge or cholelithiasis not excluded within the gallbladder.    PANCREAS: Normal.    SPLEEN: Normal.    ADRENAL GLANDS: Normal.    KIDNEYS/BLADDER: Subcentimeter left renal hypodensity too small to characterize.    BOWEL: Percutaneous gastrostomy. Wall thickening versus collapse of the distal stomach. Fluid-filled, dilated small bowel. Several areas of transition are seen within the lower abdomen and pelvis. Areas of wall thickening in the region of transition   series 4 images 128 and 139-149. There is wall thickening of the adjacent sigmoid colon and slight tethering of loops. Small amount of ascites. Presacral edema. Appendectomy.    LYMPH NODES: Normal.    VASCULATURE: Unremarkable.    PELVIC ORGANS: Stable.    MUSCULOSKELETAL: Sclerotic change sacroiliac joints.      Impression    IMPRESSION:   1.  Small bowel obstruction with several areas of transition in the lower abdomen and upper pelvis. Wall thickening in the region of transition. There is also wall thickening of adjacent sigmoid colon with tethering of loops of bowel. Uncertain if   obstruction is secondary to underlying peritoneal carcinomatosis with implants and/or adhesions.  2.  Correlate for proctocolitis.  3.  Small amount of ascites.

## 2022-01-14 NOTE — CONSULTS
Patient was seen earlier today for a complete assessment -- see note dated from earlier today for details.    Patient is on a cyclic TPN at home --> asked to run TPN continuously rather than cyclic per Dr. Mendieta's request.    Spoke with Pharmacist - custom TPN is being made and will start tonight --> will run bag that was ordered at continuous rate to 85 mL/hr  D12.3 AA4.17 at 85 mL/hr (2040 mL/day) + Lipids 250 mL 5x/week = 1547 kcals (29 kcal/kg), 85 gm pro (1.6 gm/kg and 105% pro needs), 250 gm CHO, 23% fat     Tresa Farmer, RD, LD, [facility]   Clinical Dietitian - [facility]

## 2022-01-14 NOTE — PROGRESS NOTES
[facility]    Patient is currently on service with [facility] for home TPN and IVF. Nursing provided by [facility] (P: [phone]).     Liaison will follow along. If applicable at discharge, please include Home Infusion Referral in discharge orders.     Thank you,    Pamela Hagan RN  [facility] Liaison  [phone] (M-F 8a-5p)  [phone] Office

## 2022-01-14 NOTE — CONSULTS
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 Recently s/p ex laparotomy with lysis of adhesions and G-tube placement on 12/13. Intraoperatively there was diffuse disease with carcinomatosis and involvement of the transverse colon, stomach, liver, small bowel, mesentery, and peritoneum.    PLAN:  - G tube was placed for venting purposes and do not initiate tube feeds. Patient remains TPN dependent due to carcinomatosis of virtually the entire abdominal compartment.   - If vomiting returns, can trial G-tube to intermittent wall suction for better returns. Otherwise keep to gravity 100% of the time.  - Okay for ice chips and sips of water. Do not return to further liquids until BM.  - There is no role for surgical intervention in this patient.  - Agree with oncology consult and likely need for palliative consult in this setting.         Requesting Physician:      Neel Braun MD        Chief Complaint:     Chief Complaint   Patient presents with     Nausea & Vomiting     Nausea and vomiting for the last week. Denies diarrhea. Recent SBO and g-tube plaement.           History of Present Illness:   Jefe Blanc is a 41 year old female who presented to ED last night for nausea and abdominal pain. 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 On 12/13, she had s/p ex laparotomy with lysis of adhesions and G-tube placement. Intraoperatively there was  "diffuse disease with carcinomatosis and involvement of the transverse colon, stomach, liver, small bowel, mesentery, and peritoneum. She has been having clear liquids at home for comfort since this time. She has NOT been getting nutrition through G tube and remains TPN dependent. She denies changes in liquid diet over the past couple days and states G tube has remained to gravity. The nausea started two day ago and was associated with vomiting and abdominal pain. The pain felt like cramping and wasn't relieved by heat pack. The pain did worsen with more liquids. She believes last BM was two days ago and she did have flatus this morning. CT abdomen and pelvis in the ED visualized small bowel obstruction with several areas of transition in the lower abdomen and upper pelvis as well as wall thickening in the region of transition. There is also wall thickening of adjacent sigmoid colon with tethering of loops of bowel.           Physical Exam:   Blood pressure 114/74, pulse 96, temperature (!) 96.3  F (35.7  C), temperature source Oral, resp. rate 17, height 1.575 m (5' 2\"), weight 66.7 kg (147 lb), SpO2 100 %.  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)      Diverticulitis     of esophagus     Ovarian cyst      Small bowel obstruction (H)             Past Surgical History:     Past Surgical History:   Procedure Laterality Date     " ADENOIDECTOMY       COLECTOMY WITHOUT COLOSTOMY N/A 7/27/2020    Procedure: RIGHT HEMICOLECTOMY;  Surgeon: Eliane Niocle MD;  Location:  OR     ENT SURGERY      tubes     HYSTERECTOMY SUPRACERVICAL, BILATERAL SALPINGO-OOPHORECTOMY, COMBINED N/A 7/27/2020    Procedure: DIAGNOSTIC LAPAROSCOPY, EXPLORATORY LAPAROTOMY, SUPRACERVICAL HYSTERECTOMY, RIGHT SALPINGO-OOPHORECTOMY, OMENTECTOMY;  Surgeon: Eliane Nicole MD;  Location:  OR     IR CHEST PORT PLACEMENT > 5 YRS OF AGE  9/2/2020     LAPAROSCOPIC CYSTECTOMY OVARIAN (BENIGN) Bilateral 6/12/2020    Procedure: Diagnostic laparoscopy, possible ovarian torsion, bilateral ovarian cysts.;  Surgeon: Odilia Nieves MD;  Location: [facility] OR     LAPAROTOMY EXPLORATORY N/A 12/12/2021    Procedure: EXPLORATORY LAPAROTOMY, LYSIS OF ADHESIONS, PLACEMENT OF G TUBE, AND PERITONEAL BIOPSY;  Surgeon: Mata Ibrahim MD;  Location:  OR     post partum tubal ligation              Current Medications:           pantoprazole (PROTONIX) IV  40 mg Intravenous Daily with breakfast     sodium chloride (PF)  3 mL Intracatheter Q8H       acetaminophen **OR** acetaminophen, HYDROmorphone, lidocaine 4%, lidocaine (buffered or not buffered), melatonin, naloxone **OR** naloxone **OR** naloxone **OR** naloxone, ondansetron **OR** ondansetron, prochlorperazine **OR** prochlorperazine **OR** prochlorperazine, sodium chloride (PF)         Home Medications:     Prior to Admission medications    Medication Sig Last Dose Taking? Auth Provider   oxyCODONE (ROXICODONE) 5 MG tablet Take 5 mg by mouth every 6 hours as needed for severe pain  Yes Reported, Patient   ondansetron (ZOFRAN-ODT) 4 MG ODT tab Take 1-2 tablets (4-8 mg) by mouth every 8 hours as needed for nausea   Rambo Magaña PA-C   parenteral nutrition - PTA/DISCHARGE ORDER The TPN formula will print on the After Visit Summary Report.   Rambo Magaña PA-C            Allergies:     Allergies   Allergen Reactions      Azithromycin      Erythromycin GI Disturbance and Other (See Comments)     Abdominal Pain  Vomiting       Lactose Nausea and Vomiting            Family History:     Family History   Problem Relation Age of Onset     Endometriosis Mother      Fibroids Mother      Endometriosis Sister            Social History:   Jefe Blanc  reports that she has never smoked. She has never used smokeless tobacco. She reports current alcohol use. She reports that she does not use drugs.          Review of Systems:   The 12 point Review of Systems is negative other than noted in the HPI.         Labs/Imaging   All new lab and imaging data was reviewed.   Recent Labs   Lab 01/14/22 0248 01/13/22 2047   WBC 6.2 8.1   HGB 10.0* 12.1   HCT 31.1* 37.8   MCV 84 83    244     Recent Labs   Lab 01/14/22 0248 01/13/22 2047    138   POTASSIUM 3.8  3.8 3.3*   CHLORIDE 111* 106   CO2 26 27   ANIONGAP 4 5   GLC 96 100*   BUN 10 13   CR 0.79 0.79   GFRESTIMATED >90 >90   IRON 8.2* 9.1   MAG 1.5* 1.6   PROTTOTAL  --  7.0   ALBUMIN  --  3.0*   BILITOTAL  --  0.6   ALKPHOS  --  104   AST  --  85*   ALT  --  117*     I have personally reviewed the imaging studies-   CT ABD AND PELVIS  IMPRESSION:   1.  Small bowel obstruction with several areas of transition in the lower abdomen and upper pelvis. Wall thickening in the region of transition. There is also wall thickening of adjacent sigmoid colon with tethering of loops of bowel. Uncertain if   obstruction is secondary to underlying peritoneal carcinomatosis with implants and/or adhesions.  2.  Correlate for proctocolitis.  3.  Small amount of ascites.      Alondra Estrada PA-C    45 minutes spent on date of the encounter doing patient visit, chart review, and documentation.

## 2022-01-14 NOTE — ED NOTES
[facility]  ED Nurse Handoff Report    ED Chief complaint: Nausea & Vomiting (Nausea and vomiting for the last week. Denies diarrhea. Recent SBO and g-tube plaement. )      ED Diagnosis:   Final diagnoses:   None       Code Status: Full Code    Allergies:   Allergies   Allergen Reactions     Azithromycin      Erythromycin GI Disturbance and Other (See Comments)     Abdominal Pain  Vomiting       Lactose Nausea and Vomiting       Patient Story: pt having foul smelling vomit. g tube connected to gravity bag. Pt alert and oriented. Independent with mobility  Focused Assessment:  See above    Treatments and/or interventions provided: see chart  Patient's response to treatments and/or interventions: see chart    To be done/followed up on inpatient unit:  none    Does this patient have any cognitive concerns?: none    Activity level - Baseline/Home:  Independent  Activity Level - Current:   Stand with Assist    Patient's Preferred language: English   Needed?: No    Isolation: None  Infection: Not Applicable  Patient tested for COVID 19 prior to admission: YES  Bariatric?: No    Vital Signs:   Vitals:    01/13/22 2130 01/13/22 2145 01/13/22 2308 01/13/22 2309   BP:   117/86    Pulse:   95    Resp:       Temp:       TempSrc:       SpO2: 100% 100%  100%   Weight:       Height:           Cardiac Rhythm:     Was the PSS-3 completed:   Yes  What interventions are required if any?               Family Comments: none here  OBS brochure/video discussed/provided to patient/family: N/A              Name of person given brochure if not patient: na              Relationship to patient: na    For the majority of the shift this patient's behavior was Green.   Behavioral interventions performed were none.    ED NURSE PHONE NUMBER: [phone]

## 2022-01-14 NOTE — CONSULTS
CLINICAL NUTRITION SERVICES  -  ASSESSMENT NOTE      Recommendations Ordered by Registered Dietitian (RD):   Resume usual home nocturnal TPN (8 pm - 8 am) D12.2 AA4.15 with volume 2050 mL/day + Lipids 250 mL 5x/week = 1547 kcals (29 kcal/kg), 85 gm pro (1.6 gm/kg and 105% pro needs), 250 gm CHO, 23% fat   Malnutrition:   % Weight Loss:  Weight loss does not meet criteria for malnutrition   % Intake:  No decreased intake noted  Subcutaneous Fat Loss:  None observed  Muscle Loss:  None observed  Fluid Retention:  None noted    Malnutrition Diagnosis: Patient does not meet two of the above criteria necessary for diagnosing malnutrition        REASON FOR ASSESSMENT  Jefe Blanc is a 41 year old female seen by Registered Dietitian for Pharmacy/Nutrition to Start and Manage PN    DX:   Recurrent SBO   Appendiceal cancer   G-tube for venting    NUTRITION HISTORY  - Information obtained from Epic records and patient.  - Am familiar with patient from recent [facility] admission mid December when she was started on TPN and eventually switched to nocturnal schedule for home.  - Parenteral nutrition history:  Noct TPN D12.2 AA4.15 with volume 2050 mL/day + Lipids 250 mL 5x/week = 1547 kcals (29 kcal/kg), 85 gm pro (1.6 gm/kg), 250 gm CHO, 23% fat   - Pt had N/V for 3 days PTA.  - At home had only been drinking broth and juice, but only very few sips  Allergy to Lactose (N/V).    CURRENT NUTRITION ORDERS  Diet Order:     NPO   Was asked to initiate TPN with Pharmacist as at home.    Current Intake/Tolerance:  N/A    1/13:  Abd CT =   1.  Small bowel obstruction with several areas of transition in the lower abdomen and upper pelvis. Wall thickening in the region of transition. There is also wall thickening of adjacent sigmoid colon with tethering of loops of bowel. Uncertain if   obstruction is secondary to underlying peritoneal carcinomatosis with implants and/or adhesions.  2.  Correlate for proctocolitis.  3.  Small amount of  "ascites.      NUTRITION FOCUSED PHYSICAL ASSESSMENT FOR DIAGNOSING MALNUTRITION)  Yes                Observed:    No nutrition-related physical findings observed    Obtained from Chart/Interdisciplinary Team:  Fluid retention - small amount ascites per CT    ANTHROPOMETRICS  Height: 5' 2\"  Weight: 66.7 kg (147 lbs 0 oz)   Body mass index is 26.89 kg/m .  Weight Status:  Overweight BMI 25-29.9  IBW: 50 kg  % IBW: 133%  Weight History: She has been trying to keep her weight up after losing ~ 10 lbs after her surgery last year.  Aiming for 150-160 lbs, but lately has stabilized at 147 lbs which she is okay with.    Wt Readings from Last 10 Encounters:   01/13/22 66.7 kg (147 lb)   12/20/21 66.9 kg (147 lb 7.8 oz)   04/15/21 66.7 kg (147 lb)   02/13/21 64.4 kg (142 lb)   08/05/20 72.3 kg (159 lb 6.3 oz)   06/12/20 68.9 kg (152 lb)     LABS  Labs reviewed  Mg 1.5 (L)  LFTs elevated    MEDICATIONS  Medications reviewed  IVF NaCl + 20 KCl at 100 mL/hr turned off today    ASSESSED NUTRITION NEEDS PER APPROVED PRACTICE GUIDELINES:    Dosing Weight:  54.2 kg (adjusted for overweight)  Estimated Energy Needs:  4026-6654 kcals (25-30 Kcal/Kg)  Justification: overweight  Estimated Protein Needs: 65-81 grams protein (1.2-1.5 g pro/Kg)  Justification: hypercatabolism with acute illness and preservation of lean body mass  Estimated Fluid Needs: 9490-6243 mL (1 mL/Kcal)  Justification: maintenance    MALNUTRITION:  % Weight Loss:  Weight loss does not meet criteria for malnutrition   % Intake:  No decreased intake noted  Subcutaneous Fat Loss:  None observed  Muscle Loss:  None observed  Fluid Retention:  Ascites per CT    Malnutrition Diagnosis: Patient does not meet two of the above criteria necessary for diagnosing malnutrition    NUTRITION DIAGNOSIS:  Inadequate parenteral nutrition infusion related to TPN on hold as evidenced by no TPN ran last night but plan resumption tonight.    NUTRITION INTERVENTIONS  Recommendations / " Nutrition Prescription  Resume usual home nocturnal TPN (8 pm - 8 am) D12.2 AA4.15 with volume 2050 mL/day + Lipids 250 mL 5x/week = 1547 kcals (29 kcal/kg), 85 gm pro (1.6 gm/kg and 105% pro needs), 250 gm CHO, 23% fat    Implementation  Nutrition education: Not appropriate at this time due to patient condition  Collaboration and Referral of Nutrition care - Spoke with Pharmacist regarding TPN plan  PN Composition and PN Schedule - Entered TPN orders in Epic as above    Nutrition Goals  Nocturnal TPN/Lipid will meet % estimated needs    MONITORING AND EVALUATION:  Progress towards goals will be monitored and evaluated per protocol and Practice Guidelines    Claudette Beavers, RD, LD, CNSC

## 2022-01-15 ENCOUNTER — APPOINTMENT (OUTPATIENT)
Dept: GENERAL RADIOLOGY | Facility: CLINIC | Age: 42
End: 2022-01-15
Attending: HOSPITALIST
Payer: COMMERCIAL

## 2022-01-15 PROBLEM — R50.9 FEVER: Status: ACTIVE | Noted: 2022-01-15

## 2022-01-15 LAB
ALBUMIN UR-MCNC: NEGATIVE MG/DL
ANION GAP SERPL CALCULATED.3IONS-SCNC: 4 MMOL/L (ref 3–14)
APPEARANCE UR: CLEAR
BASOPHILS # BLD AUTO: 0 10E3/UL (ref 0–0.2)
BASOPHILS NFR BLD AUTO: 0 %
BILIRUB UR QL STRIP: NEGATIVE
BUN SERPL-MCNC: 9 MG/DL (ref 7–30)
CALCIUM SERPL-MCNC: 8.2 MG/DL (ref 8.5–10.1)
CHLORIDE BLD-SCNC: 106 MMOL/L (ref 94–109)
CO2 SERPL-SCNC: 26 MMOL/L (ref 20–32)
COLOR UR AUTO: ABNORMAL
CREAT SERPL-MCNC: 0.67 MG/DL (ref 0.52–1.04)
EOSINOPHIL # BLD AUTO: 0 10E3/UL (ref 0–0.7)
EOSINOPHIL NFR BLD AUTO: 0 %
ERYTHROCYTE [DISTWIDTH] IN BLOOD BY AUTOMATED COUNT: 13.9 % (ref 10–15)
GFR SERPL CREATININE-BSD FRML MDRD: >90 ML/MIN/1.73M2
GLUCOSE BLD-MCNC: 164 MG/DL (ref 70–99)
GLUCOSE BLDC GLUCOMTR-MCNC: 121 MG/DL (ref 70–99)
GLUCOSE BLDC GLUCOMTR-MCNC: 128 MG/DL (ref 70–99)
GLUCOSE BLDC GLUCOMTR-MCNC: 133 MG/DL (ref 70–99)
GLUCOSE BLDC GLUCOMTR-MCNC: 137 MG/DL (ref 70–99)
GLUCOSE BLDC GLUCOMTR-MCNC: 149 MG/DL (ref 70–99)
GLUCOSE BLDC GLUCOMTR-MCNC: 151 MG/DL (ref 70–99)
GLUCOSE UR STRIP-MCNC: NEGATIVE MG/DL
HCT VFR BLD AUTO: 31.7 % (ref 35–47)
HGB BLD-MCNC: 10.6 G/DL (ref 11.7–15.7)
HGB UR QL STRIP: NEGATIVE
IMM GRANULOCYTES # BLD: 0 10E3/UL
IMM GRANULOCYTES NFR BLD: 0 %
INR PPP: 1.13 (ref 0.85–1.15)
KETONES UR STRIP-MCNC: NEGATIVE MG/DL
LACTATE SERPL-SCNC: 1.1 MMOL/L (ref 0.7–2)
LEUKOCYTE ESTERASE UR QL STRIP: ABNORMAL
LYMPHOCYTES # BLD AUTO: 0.7 10E3/UL (ref 0.8–5.3)
LYMPHOCYTES NFR BLD AUTO: 6 %
MAGNESIUM SERPL-MCNC: 1.6 MG/DL (ref 1.6–2.3)
MCH RBC QN AUTO: 27.5 PG (ref 26.5–33)
MCHC RBC AUTO-ENTMCNC: 33.4 G/DL (ref 31.5–36.5)
MCV RBC AUTO: 82 FL (ref 78–100)
MONOCYTES # BLD AUTO: 0.5 10E3/UL (ref 0–1.3)
MONOCYTES NFR BLD AUTO: 4 %
MUCOUS THREADS #/AREA URNS LPF: PRESENT /LPF
NEUTROPHILS # BLD AUTO: 10 10E3/UL (ref 1.6–8.3)
NEUTROPHILS NFR BLD AUTO: 90 %
NITRATE UR QL: NEGATIVE
NRBC # BLD AUTO: 0 10E3/UL
NRBC BLD AUTO-RTO: 0 /100
PH UR STRIP: 5.5 [PH] (ref 5–7)
PLATELET # BLD AUTO: 231 10E3/UL (ref 150–450)
POTASSIUM BLD-SCNC: 3.4 MMOL/L (ref 3.4–5.3)
PREALB SERPL IA-MCNC: 21 MG/DL (ref 15–45)
PROCALCITONIN SERPL-MCNC: <0.05 NG/ML
RBC # BLD AUTO: 3.86 10E6/UL (ref 3.8–5.2)
RBC URINE: 1 /HPF
SODIUM SERPL-SCNC: 136 MMOL/L (ref 133–144)
SP GR UR STRIP: 1.01 (ref 1–1.03)
SQUAMOUS EPITHELIAL: <1 /HPF
UROBILINOGEN UR STRIP-MCNC: NORMAL MG/DL
WBC # BLD AUTO: 11.2 10E3/UL (ref 4–11)
WBC URINE: 6 /HPF

## 2022-01-15 PROCEDURE — C9113 INJ PANTOPRAZOLE SODIUM, VIA: HCPCS | Performed by: HOSPITALIST

## 2022-01-15 PROCEDURE — 82310 ASSAY OF CALCIUM: CPT | Performed by: INTERNAL MEDICINE

## 2022-01-15 PROCEDURE — 84134 ASSAY OF PREALBUMIN: CPT | Performed by: NURSE PRACTITIONER

## 2022-01-15 PROCEDURE — 85025 COMPLETE CBC W/AUTO DIFF WBC: CPT | Performed by: HOSPITALIST

## 2022-01-15 PROCEDURE — 250N000011 HC RX IP 250 OP 636: Performed by: HOSPITALIST

## 2022-01-15 PROCEDURE — 250N000011 HC RX IP 250 OP 636: Performed by: INTERNAL MEDICINE

## 2022-01-15 PROCEDURE — 36415 COLL VENOUS BLD VENIPUNCTURE: CPT | Performed by: INTERNAL MEDICINE

## 2022-01-15 PROCEDURE — 250N000009 HC RX 250

## 2022-01-15 PROCEDURE — 250N000013 HC RX MED GY IP 250 OP 250 PS 637: Performed by: INTERNAL MEDICINE

## 2022-01-15 PROCEDURE — 258N000003 HC RX IP 258 OP 636: Performed by: HOSPITALIST

## 2022-01-15 PROCEDURE — 83605 ASSAY OF LACTIC ACID: CPT | Performed by: HOSPITALIST

## 2022-01-15 PROCEDURE — 71045 X-RAY EXAM CHEST 1 VIEW: CPT

## 2022-01-15 PROCEDURE — 99231 SBSQ HOSP IP/OBS SF/LOW 25: CPT | Mod: 24 | Performed by: SURGERY

## 2022-01-15 PROCEDURE — 85610 PROTHROMBIN TIME: CPT | Performed by: NURSE PRACTITIONER

## 2022-01-15 PROCEDURE — 87040 BLOOD CULTURE FOR BACTERIA: CPT | Performed by: INTERNAL MEDICINE

## 2022-01-15 PROCEDURE — 87040 BLOOD CULTURE FOR BACTERIA: CPT | Performed by: HOSPITALIST

## 2022-01-15 PROCEDURE — 120N000001 HC R&B MED SURG/OB

## 2022-01-15 PROCEDURE — 250N000012 HC RX MED GY IP 250 OP 636 PS 637: Performed by: NURSE PRACTITIONER

## 2022-01-15 PROCEDURE — 83735 ASSAY OF MAGNESIUM: CPT | Performed by: INTERNAL MEDICINE

## 2022-01-15 PROCEDURE — 84145 PROCALCITONIN (PCT): CPT | Performed by: HOSPITALIST

## 2022-01-15 PROCEDURE — 99232 SBSQ HOSP IP/OBS MODERATE 35: CPT | Performed by: INTERNAL MEDICINE

## 2022-01-15 PROCEDURE — 3E0436Z INTRODUCTION OF NUTRITIONAL SUBSTANCE INTO CENTRAL VEIN, PERCUTANEOUS APPROACH: ICD-10-PCS | Performed by: INTERNAL MEDICINE

## 2022-01-15 PROCEDURE — 250N000013 HC RX MED GY IP 250 OP 250 PS 637: Performed by: HOSPITALIST

## 2022-01-15 PROCEDURE — 81001 URINALYSIS AUTO W/SCOPE: CPT | Performed by: HOSPITALIST

## 2022-01-15 RX ORDER — PANTOPRAZOLE SODIUM 40 MG/1
40 TABLET, DELAYED RELEASE ORAL
Status: DISCONTINUED | OUTPATIENT
Start: 2022-01-16 | End: 2022-01-16

## 2022-01-15 RX ORDER — MAGNESIUM SULFATE HEPTAHYDRATE 40 MG/ML
2 INJECTION, SOLUTION INTRAVENOUS ONCE
Status: COMPLETED | OUTPATIENT
Start: 2022-01-15 | End: 2022-01-15

## 2022-01-15 RX ORDER — LIDOCAINE 40 MG/G
CREAM TOPICAL
Status: DISCONTINUED | OUTPATIENT
Start: 2022-01-15 | End: 2022-01-15

## 2022-01-15 RX ORDER — SODIUM CHLORIDE 9 MG/ML
INJECTION, SOLUTION INTRAVENOUS CONTINUOUS
Status: DISCONTINUED | OUTPATIENT
Start: 2022-01-15 | End: 2022-01-17

## 2022-01-15 RX ORDER — PIPERACILLIN SODIUM, TAZOBACTAM SODIUM 4; .5 G/20ML; G/20ML
4.5 INJECTION, POWDER, LYOPHILIZED, FOR SOLUTION INTRAVENOUS EVERY 6 HOURS
Status: DISCONTINUED | OUTPATIENT
Start: 2022-01-15 | End: 2022-01-15

## 2022-01-15 RX ORDER — PIPERACILLIN SODIUM, TAZOBACTAM SODIUM 3; .375 G/15ML; G/15ML
3.38 INJECTION, POWDER, LYOPHILIZED, FOR SOLUTION INTRAVENOUS EVERY 6 HOURS
Status: DISCONTINUED | OUTPATIENT
Start: 2022-01-15 | End: 2022-01-17

## 2022-01-15 RX ORDER — POTASSIUM CHLORIDE 1500 MG/1
40 TABLET, EXTENDED RELEASE ORAL DAILY
Status: DISCONTINUED | OUTPATIENT
Start: 2022-01-15 | End: 2022-01-17

## 2022-01-15 RX ADMIN — ACETAMINOPHEN 650 MG: 325 TABLET, FILM COATED ORAL at 06:39

## 2022-01-15 RX ADMIN — PIPERACILLIN SODIUM AND TAZOBACTAM SODIUM 3.38 G: 3; .375 INJECTION, POWDER, LYOPHILIZED, FOR SOLUTION INTRAVENOUS at 16:56

## 2022-01-15 RX ADMIN — MAGNESIUM SULFATE HEPTAHYDRATE: 500 INJECTION, SOLUTION INTRAMUSCULAR; INTRAVENOUS at 20:59

## 2022-01-15 RX ADMIN — VANCOMYCIN HYDROCHLORIDE 1750 MG: 5 INJECTION, POWDER, LYOPHILIZED, FOR SOLUTION INTRAVENOUS at 10:23

## 2022-01-15 RX ADMIN — PIPERACILLIN SODIUM AND TAZOBACTAM SODIUM 4.5 G: 4; .5 INJECTION, POWDER, LYOPHILIZED, FOR SOLUTION INTRAVENOUS at 09:28

## 2022-01-15 RX ADMIN — HYDROMORPHONE HYDROCHLORIDE 0.2 MG: 0.2 INJECTION, SOLUTION INTRAMUSCULAR; INTRAVENOUS; SUBCUTANEOUS at 20:14

## 2022-01-15 RX ADMIN — SODIUM CHLORIDE 2000 ML: 9 INJECTION, SOLUTION INTRAVENOUS at 08:18

## 2022-01-15 RX ADMIN — SODIUM CHLORIDE: 9 INJECTION, SOLUTION INTRAVENOUS at 10:22

## 2022-01-15 RX ADMIN — POTASSIUM CHLORIDE 40 MEQ: 1500 TABLET, EXTENDED RELEASE ORAL at 10:45

## 2022-01-15 RX ADMIN — ONDANSETRON 4 MG: 4 TABLET, ORALLY DISINTEGRATING ORAL at 08:52

## 2022-01-15 RX ADMIN — PIPERACILLIN SODIUM AND TAZOBACTAM SODIUM 3.38 G: 3; .375 INJECTION, POWDER, LYOPHILIZED, FOR SOLUTION INTRAVENOUS at 22:54

## 2022-01-15 RX ADMIN — MAGNESIUM SULFATE HEPTAHYDRATE 2 G: 40 INJECTION, SOLUTION INTRAVENOUS at 15:03

## 2022-01-15 RX ADMIN — HYDROMORPHONE HYDROCHLORIDE 0.2 MG: 0.2 INJECTION, SOLUTION INTRAMUSCULAR; INTRAVENOUS; SUBCUTANEOUS at 08:19

## 2022-01-15 RX ADMIN — INSULIN ASPART 1 UNITS: 100 INJECTION, SOLUTION INTRAVENOUS; SUBCUTANEOUS at 00:18

## 2022-01-15 RX ADMIN — HYDROXYZINE HYDROCHLORIDE 25 MG: 25 TABLET ORAL at 10:44

## 2022-01-15 RX ADMIN — HYDROMORPHONE HYDROCHLORIDE 0.2 MG: 0.2 INJECTION, SOLUTION INTRAMUSCULAR; INTRAVENOUS; SUBCUTANEOUS at 05:24

## 2022-01-15 RX ADMIN — ONDANSETRON 4 MG: 2 INJECTION INTRAMUSCULAR; INTRAVENOUS at 00:04

## 2022-01-15 RX ADMIN — PANTOPRAZOLE SODIUM 40 MG: 40 INJECTION, POWDER, FOR SOLUTION INTRAVENOUS at 08:19

## 2022-01-15 RX ADMIN — HYDROMORPHONE HYDROCHLORIDE 0.2 MG: 0.2 INJECTION, SOLUTION INTRAMUSCULAR; INTRAVENOUS; SUBCUTANEOUS at 15:02

## 2022-01-15 ASSESSMENT — ACTIVITIES OF DAILY LIVING (ADL)
ADLS_ACUITY_SCORE: 9

## 2022-01-15 ASSESSMENT — MIFFLIN-ST. JEOR: SCORE: 1349.92

## 2022-01-15 NOTE — PLAN OF CARE
A&OX4, VSS on RA except for high tempt, trending down after tylenol, and tacycardiac. Up SBA to the BSC. TPN at 85mL/hr. IVF at 75mL/hr. G-tube to gravity, NG tube placed this shift for decompression, to LIS. Pain in the abd managed with IV dilaudid and PRN atarax. Nausea on and off managed with zofran and atarax Zosyn and Vanco started this AM. CXR and UA negative. Has frequent loose BMS this shift. Hem/onc and gen surgery following. Continue to monitor.

## 2022-01-15 NOTE — PROVIDER NOTIFICATION
MD Notification    Notified Person: MD    Notified Person Name: Dr. Weldon    Notification Date/Time: January 15, 2022 4474    Notification Interaction: TORB    Purpose of Notification: New temp 103.2 axillary and sustained HR 140s    Orders Received: Sepsis order set ordered (blood cultures, abx, labs, VS q 2)    Comments: Clarified w/ MD about VS q 2 hr and how typically that is an IMC and if it was ok to do q 4 hr. MD says it is [facility] sepsis protocol and does not feel pt is IMC status. Keep VS q 2 hr for now.

## 2022-01-15 NOTE — PHARMACY-ADMISSION MEDICATION HISTORY
Pharmacy Medication History  Admission medication history interview status for the 1/13/2022  admission is complete. See EPIC admission navigator for prior to admission medications     Location of Interview: Patient room  Medication history sources: Patient, Surescripts and Care Everywhere    Significant changes made to the medication list:  Added melatonin    In the past week, patient estimated taking medication this percent of the time: greater than 90% (only scheduled item on med list is cyclic TPN for which patient stated not missing any days PTA)     Additional medication history information:   Patient is a reliable historian.     Medication reconciliation completed by provider prior to medication history? No    Time spent in this activity: 15 minutes    Prior to Admission medications    Medication Sig Last Dose Taking? Auth Provider   MELATONIN PO Take 1 tablet by mouth nightly as needed More than a month at Unknown time Yes Unknown, Entered By History   ondansetron (ZOFRAN-ODT) 4 MG ODT tab Take 1-2 tablets (4-8 mg) by mouth every 8 hours as needed for nausea 1/9/2022 at PM Yes Rambo Magaña PA-C   oxyCODONE (ROXICODONE) 5 MG tablet Take 5 mg by mouth every 6 hours as needed for severe pain 1/9/2022 at PM Yes Reported, Patient   parenteral nutrition - PTA/DISCHARGE ORDER The TPN formula will print on the After Visit Summary Report. 1/12/2022 at PM Yes Rambo Magaña PA-C       The information provided in this note is only as accurate as the sources available at the time of update(s)   Vianey John, AngelD

## 2022-01-15 NOTE — PLAN OF CARE
A&Ox4. VSS exc tachy (130s to 140s) and temp of 102.9. Hospitalist saw pt and put orders in at 6:50. Up SBA to BR. C/o intermittent abd pain, dilaudid x2 administered. Intermittent nausea, zofran x1 administered. BS active, gastrostomy tube to gravity, BM overnight. Adequate UO. Mag redraw this AM. TPN started yesterday evening.

## 2022-01-15 NOTE — PLAN OF CARE
Pt alert and oriented, up with stand by assist to bathroom, good urine output, lungs clear, O2 sats mid 90's on room air, +BS, gastrostomy tube to gravity bag, pt complains of abd pain, IV dilaudid give as needed for pain, compazine given as needed for nausea, sips of clears tolerated, mag replaced, redraw in am, pt has port that is accessed, TPN ordered to start this pm.

## 2022-01-15 NOTE — SIGNIFICANT EVENT
"Sepsis Evaluation Progress Note    I was called to see Jefe Blanc due to abnormal vital signs triggering the Sepsis SIRS screening alert. She is not known to have an infection.     Physical Exam   Vital Signs:  Temp: (!) 102.9  F (39.4  C) Temp src: Oral BP: (!) 142/90 Pulse: (!) 142   Resp: 16 SpO2: 97 % O2 Device: None (Room air)       General: in no acute distress  Mental Status: AAOx4. With baseline mental status    Remainder of physical exam is significant for lungs are clear to auscultation with bilateral airmovement. tachycardic on exam with a regular rhythm.  warm to touch. epigastric tenderness, bowel sounds present.     Data   Lactic Acid   Date Value Ref Range Status   12/24/2021 0.7 0.7 - 2.0 mmol/L Final   07/31/2020 0.7 0.7 - 2.0 mmol/L Final     Lactate for Sepsis Protocol   Date Value Ref Range Status   09/02/2020 0.7 0.7 - 2.0 mmol/L Final   08/02/2020 0.8 0.7 - 2.0 mmol/L Final       Assessment & Plan   Jefe Blanc meets SIRS criteria lactic acid requested, but not drawn yet. Stat zosyn and vancomysin ordered. IV fluids 30mg/kg ordered. Potential source of infection would be intraabdominal due to carcinamatosis. Fever and tachycardia could also be paraneoplastic. F/u blood cultures, procalcitonin, ua, cxr and cbc.       Anti-infectives (From now, onward)    Start     Dose/Rate Route Frequency Ordered Stop    01/15/22 0700  piperacillin-tazobactam (ZOSYN) 4.5 g vial to attach to  mL bag        Note to Pharmacy: For [facility], [facility] and [facility]: For Zosyn-naive patients, use the \"Zosyn initial dose + extended infusion\" order panel.    4.5 g  over 30 Minutes Intravenous EVERY 6 HOURS 01/15/22 0642          Current antibiotic coverage is appropriate for source of infection.     Disposition: The patient will remain on the current unit. We will continue to monitor this patient closely..  Get Weldon MD, MD      "

## 2022-01-15 NOTE — PROGRESS NOTES
[facility]    Hospitalist Progress Note    Assessment & Plan   41 year old female who was admitted on 1/13/2022 with recurrent SBO related to Carcinomatosis    Impression:   Principal Problem:    Recurrent SBO (small bowel obstruction) -- S/P venting G-tube    -- Oncology ordered TPN (would advise continuous as 12 hrs on then off creates hyperglycemia with no nutritional advantages)       Fever              -- started on IV Zosyn and Vanco last evening, will stop Vancomycin and await cultures                Active Problems:    Appendiceal Adeno CA w Carcinomatosis -- S/P Right hemicolectomy, SHELDON/BSO 7/27/20   -- followed my [facility], given her poor functional status not sure if additional chemo an option, will wait for their final opinion      Plan:  Patient would like additional chemotherapy, Oncology states will consider further chemotherapy if SBO resolves ... which it won't.  This is an unfortunate situation but will address code status with patient and family.  Updated patient's sister.     DVT Prophylaxis: Pneumatic Compression Devices  Code Status: Full Code    Disposition: Expected discharge in 3 days (may need to set up home TPN if patient chooses to go that route).      Tio Silva Jennifer  Pager [phone]  Cell Phone [phone]  Text Page (7am to 6pm)    Interval History   No new complaints.  Patient says she is interested in further chemotherapy and waiting to talk with oncology.      Physical Exam   Temp: 99.4  F (37.4  C) Temp src: Oral BP: 125/85 Pulse: 107   Resp: 18 SpO2: 100 % O2 Device: None (Room air)    Vitals:    01/13/22 2014 01/15/22 0612   Weight: 66.7 kg (147 lb) 72.2 kg (159 lb 1.6 oz)     Vital Signs with Ranges  Temp:  [98.5  F (36.9  C)-103.4  F (39.7  C)] 99.4  F (37.4  C)  Pulse:  [] 107  Resp:  [16-20] 18  BP: (119-156)/(79-99) 125/85  SpO2:  [97 %-100 %] 100 %  I/O last 3 completed shifts:  In: -   Out: 730 [Urine:600; Emesis/NG  output:130]    # Pain Assessment:  Current Pain Score 1/15/2022   Patient currently in pain? yes   Pain score (0-10) -   - Jefe is experiencing pain due to SBO. Pain management was discussed and the plan was created in a collaborative fashion.  Jefe's response to the current recommendations: engaged  - Please see the plan for pain management as documented above    Constitutional: Awake, alert, cooperative, no apparent distress -- lying in bed, covers partially over her head.   Respiratory: Clear to auscultation bilaterally, no crackles or wheezing  Cardiovascular: Regular rate and rhythm, normal S1 and S2, and no murmur noted  GI: bowels sounds present but decreased, moderate epigastric tenderness, no distension noted  Extrem: No calf tenderness, no ankle edema  Neuro: Ox3, no focal motor or sensory deficits    Medications     dextrose       parenteral nutrition - ADULT compounded formula       parenteral nutrition - ADULT compounded formula 85 mL/hr at 01/14/22 2049     sodium chloride 75 mL/hr at 01/15/22 1043       docusate sodium  200 mg Oral BID     insulin aspart  1-6 Units Subcutaneous BID     lipids  250 mL Intravenous Once per day on Mon Tue Wed Thu Fri     [START ON 1/16/2022] pantoprazole  40 mg Oral QAM AC     piperacillin-tazobactam  3.375 g Intravenous Q6H     potassium chloride  40 mEq Oral Daily     sodium chloride (PF)  3 mL Intracatheter Q8H     sodium chloride (PF)  3 mL Intracatheter Q8H       Data   Recent Labs   Lab 01/15/22  1157 01/15/22  0805 01/15/22  0704 01/15/22  0609 01/15/22  0534 01/14/22  2301 01/14/22  0248 01/13/22  2047   WBC  --   --  11.2*  --   --   --  6.2 8.1   HGB  --   --  10.6*  --   --   --  10.0* 12.1   MCV  --   --  82  --   --   --  84 83   PLT  --   --  231  --   --   --  213 244   INR  --   --   --   --  1.13  --   --  1.02   NA  --   --   --   --  136  --  141 138   POTASSIUM  --   --   --   --  3.4  --  3.8  3.8 3.3*   CHLORIDE  --   --   --   --  106   --  111* 106   CO2  --   --   --   --  26  --  26 27   BUN  --   --   --   --  9  --  10 13   CR  --   --   --   --  0.67  --  0.79 0.79   ANIONGAP  --   --   --   --  4  --  4 5   IRON  --   --   --   --  8.2*  --  8.2* 9.1   * 149*  --  133* 164*   < > 96 100*   ALBUMIN  --   --   --   --   --   --   --  3.0*   PROTTOTAL  --   --   --   --   --   --   --  7.0   BILITOTAL  --   --   --   --   --   --   --  0.6   ALKPHOS  --   --   --   --   --   --   --  104   ALT  --   --   --   --   --   --   --  117*   AST  --   --   --   --   --   --   --  85*   LIPASE  --   --   --   --   --   --   --  93    < > = values in this interval not displayed.       Imaging:   Recent Results (from the past 24 hour(s))   XR Chest Port 1 View    Narrative    EXAM: XR CHEST PORTABLE 1 VIEW  LOCATION: [facility]  DATE/TIME: 01/15/2022, 9:00 AM    INDICATION: Fever. Rule out PNA.  COMPARISON: None.      Impression    IMPRESSION: No acute airspace disease. No effusion. Normal cardiac silhouette. Right chest Port-A-Cath tip at the left low SVC. Small hiatal hernia.

## 2022-01-15 NOTE — PROGRESS NOTES
Surgery Note    Pt tachycardic with nausea and abdominal pain. Her Gtube is not draining very well and I think it is due to the connection to the gravity drainage. I will find a different connection which is not as narrow to see if this helps. If has ongoing nausea or emesis, she may need NG drainage in addition to gastrostomy drainage for comfort.     Addendum: unfortunately there are no other connection options due to the type of gastrostomy tube in place. The tube irrigates well but is not draining well. She is having emesis and ongoing significant nausea. I would recommend an NGT for comfort at this time.     I think there needs to be discussion with either hospitalist vs oncology regarding prognosis for this patient. There are no surgical options available. I would recommend palliative care consultation.     Doreen Cantu MD  [facility]  [phone]

## 2022-01-15 NOTE — PROGRESS NOTES
[facility] Progress Note          Assessment and Plan:   Ferny is a 41 year old admitted 1/13 with abdominal pain, nausea and vomiting with recurrent small bowel obstruction     Small bowel obstruction  Peritoneal carcinomatosis  Venting G tube  - Was hospitalized recently in [facility] with abdominal pain, nausea and vomiting  - G tube has been venting since she left the hospital last, but there were a few days when there was less output  - Last BM prior to hospitalization at [place], but she is passing some flatus  - At home had only been drinking broth and juice, but only very few sips  - Was using very little oxycodone at home, maybe 5 mg oxycodone daily  - Left [place] 1/13 and came to [facility] due to ongoing symptoms  - CT abdomen and pelvis 1/13 showed fluid filled, dilated small bowel with several areas of transition in the lower abdomen and pelvis. Areas of wall thickening in the region of the transition as well as adjacent sigmoid colon and slight tethering of loops. Small amount of ascites.  - Surgery consulted. No plans for surgical intervention.  Noted plan for different connection to the gravity drainage.  If has ongoing nausea/emesis needs NG drainage in addition  - She really wants to have some small sips of juice which I do not have a problem with given the overall severity of the whole picture. This is more of a comfort/quality of life issue at this time.  - Continue TPN.  Pharmacy to assist.    - The underlying issue is her metastatic appendiceal carcinoma. I think we are in a challenging predicament. She is not in very good shape to get chemotherapy, but that is the one thing that might reduce the carcinomatosis to help reduce the likelihood of ongoing obstruction. At this time no plans to initiate chemotherapy as inpatient.  If she recovers from current SBO will have her follow up with Dr. Mesa for consideration for further palliative chemotherapy given her overall young age and no  "other major medical issues.  Palliative care consultation reasonable for discussion of goals of care.      We will continue to follow her with you.    Kalyan Feliciano MD                 Interval History:   Continues to report nausea.                Review of Systems:   As per subjective, otherwise 5 systems reviewed and negative.           Physical Exam:   Blood pressure 125/85, pulse 111, temperature 99.3  F (37.4  C), temperature source Oral, resp. rate 20, height 1.575 m (5' 2\"), weight 72.2 kg (159 lb 1.6 oz), SpO2 99 %.      Vital Sign Ranges  Temperature Temp  Av.2  F (37.9  C)  Min: 98.5  F (36.9  C)  Max: 103.4  F (39.7  C)   Blood pressure Systolic (24hrs), Av , Min:116 , Max:156        Diastolic (24hrs), Av, Min:79, Max:99      Pulse Pulse  Av.7  Min: 93  Max: 144   Respirations Resp  Av.1  Min: 16  Max: 20   Pulse oximetry SpO2  Av.8 %  Min: 97 %  Max: 100 %         Intake/Output Summary (Last 24 hours) at 1/15/2022 1153  Last data filed at 1/15/2022 1100  Gross per 24 hour   Intake 60 ml   Output 880 ml   Net -820 ml       Constitutional:   No acute distress.   Skin:   No rashes, petechiae, or ecchymoses.   HEENT:   Normocephalic, atraumatic. Oropharynx clear with no mucosal lesions or thrush.   Neck:   Supple.   Lungs:   Clear to auscultation bilaterally.   Cardiovascular:   Regular rate and rhythm with no murmurs, rubs, or gallops.   Abdomen:   Absent bowel sounds.  Venting G-tube to gravity   Extremities:   No clubbing, cyanosis, or edema.   Neurological:   No focal motor or sensory deficits.            Medications:     No current outpatient medications on file.                Data:     Results for orders placed or performed during the hospital encounter of 22 (from the past 24 hour(s))   Glucose by meter   Result Value Ref Range    GLUCOSE BY METER POCT 141 (H) 70 - 99 mg/dL   Glucose by meter   Result Value Ref Range    GLUCOSE BY METER POCT 137 (H) 70 - 99 mg/dL "   Magnesium   Result Value Ref Range    Magnesium 1.6 1.6 - 2.3 mg/dL   INR   Result Value Ref Range    INR 1.13 0.85 - 1.15   Prealbumin   Result Value Ref Range    Prealbumin 21 15 - 45 mg/dL   Basic metabolic panel   Result Value Ref Range    Sodium 136 133 - 144 mmol/L    Potassium 3.4 3.4 - 5.3 mmol/L    Chloride 106 94 - 109 mmol/L    Carbon Dioxide (CO2) 26 20 - 32 mmol/L    Anion Gap 4 3 - 14 mmol/L    Urea Nitrogen 9 7 - 30 mg/dL    Creatinine 0.67 0.52 - 1.04 mg/dL    Calcium 8.2 (L) 8.5 - 10.1 mg/dL    Glucose 164 (H) 70 - 99 mg/dL    GFR Estimate >90 >60 mL/min/1.73m2   Procalcitonin   Result Value Ref Range    Procalcitonin <0.05 <0.05 ng/mL   Glucose by meter   Result Value Ref Range    GLUCOSE BY METER POCT 133 (H) 70 - 99 mg/dL   Lactic acid whole blood   Result Value Ref Range    Lactic Acid 1.1 0.7 - 2.0 mmol/L   CBC with platelets differential    Narrative    The following orders were created for panel order CBC with platelets differential.  Procedure                               Abnormality         Status                     ---------                               -----------         ------                     CBC with platelets and d...[[id number]]  Abnormal            Final result                 Please view results for these tests on the individual orders.   CBC with platelets and differential   Result Value Ref Range    WBC Count 11.2 (H) 4.0 - 11.0 10e3/uL    RBC Count 3.86 3.80 - 5.20 10e6/uL    Hemoglobin 10.6 (L) 11.7 - 15.7 g/dL    Hematocrit 31.7 (L) 35.0 - 47.0 %    MCV 82 78 - 100 fL    MCH 27.5 26.5 - 33.0 pg    MCHC 33.4 31.5 - 36.5 g/dL    RDW 13.9 10.0 - 15.0 %    Platelet Count 231 150 - 450 10e3/uL    % Neutrophils 90 %    % Lymphocytes 6 %    % Monocytes 4 %    % Eosinophils 0 %    % Basophils 0 %    % Immature Granulocytes 0 %    NRBCs per 100 WBC 0 <1 /100    Absolute Neutrophils 10.0 (H) 1.6 - 8.3 10e3/uL    Absolute Lymphocytes 0.7 (L) 0.8 - 5.3 10e3/uL    Absolute  Monocytes 0.5 0.0 - 1.3 10e3/uL    Absolute Eosinophils 0.0 0.0 - 0.7 10e3/uL    Absolute Basophils 0.0 0.0 - 0.2 10e3/uL    Absolute Immature Granulocytes 0.0 <=0.4 10e3/uL    Absolute NRBCs 0.0 10e3/uL   Glucose by meter   Result Value Ref Range    GLUCOSE BY METER POCT 149 (H) 70 - 99 mg/dL   XR Chest Port 1 View    Narrative    EXAM: XR CHEST PORTABLE 1 VIEW  LOCATION: [facility]  DATE/TIME: 01/15/2022, 9:00 AM    INDICATION: Fever. Rule out PNA.  COMPARISON: None.      Impression    IMPRESSION: No acute airspace disease. No effusion. Normal cardiac silhouette. Right chest Port-A-Cath tip at the left low SVC. Small hiatal hernia.     UA reflex to Microscopic and Culture    Specimen: Urine, Clean Catch   Result Value Ref Range    Color Urine Light Yellow Colorless, Straw, Light Yellow, Yellow    Appearance Urine Clear Clear    Glucose Urine Negative Negative mg/dL    Bilirubin Urine Negative Negative    Ketones Urine Negative Negative mg/dL    Specific Gravity Urine 1.013 1.003 - 1.035    Blood Urine Negative Negative    pH Urine 5.5 5.0 - 7.0    Protein Albumin Urine Negative Negative mg/dL    Urobilinogen Urine Normal Normal, 2.0 mg/dL    Nitrite Urine Negative Negative    Leukocyte Esterase Urine Small (A) Negative    Mucus Urine Present (A) None Seen /LPF    RBC Urine 1 <=2 /HPF    WBC Urine 6 (H) <=5 /HPF    Squamous Epithelials Urine <1 <=1 /HPF    Narrative    Urine Culture not indicated

## 2022-01-15 NOTE — PHARMACY-VANCOMYCIN DOSING SERVICE
"Pharmacy Vancomycin Initial Note  Date of Service January 15, 2022  Patient's  1980  41 year old, female    Indication: Sepsis    Current estimated CrCl = Estimated Creatinine Clearance: 104.8 mL/min (based on SCr of 0.67 mg/dL).    Creatinine for last 3 days  2022:  8:47 PM Creatinine 0.79 mg/dL  2022:  2:48 AM Creatinine 0.79 mg/dL  1/15/2022:  5:34 AM Creatinine 0.67 mg/dL    Recent Vancomycin Level(s) for last 3 days  No results found for requested labs within last 72 hours.      Vancomycin IV Administrations (past 72 hours)      No vancomycin orders with administrations in past 72 hours.                Nephrotoxins and other renal medications (From now, onward)    Start     Dose/Rate Route Frequency Ordered Stop    01/15/22 2000  vancomycin 1250 mg in 0.9% NaCl 250 mL intermittent infusion 1,250 mg         1,250 mg  over 90 Minutes Intravenous EVERY 12 HOURS 01/15/22 0743      01/15/22 0730  vancomycin (VANCOCIN) 1,750 mg in sodium chloride 0.9 % 250 mL intermittent infusion         1,750 mg (central catheter)  over 90 Minutes Intravenous ONCE 01/15/22 0715      01/15/22 0700  piperacillin-tazobactam (ZOSYN) 4.5 g vial to attach to  mL bag        Note to Pharmacy: For [facility], [facility] and [facility]: For Zosyn-naive patients, use the \"Zosyn initial dose + extended infusion\" order panel.    4.5 g  over 30 Minutes Intravenous EVERY 6 HOURS 01/15/22 0642            Contrast Orders - past 72 hours (72h ago, onward)            Start     Dose/Rate Route Frequency Ordered Stop    22 214  iopamidol (ISOVUE-370) solution 74 mL         74 mL Intravenous ONCE 22 223          [facility] Prediction of Planned Initial Vancomycin Regimen  Loading dose: 1750 mg once  Regimen: 1250 mg IV every 12 hours.  Start time: 08:00 on 2022  Exposure target: AUC24 (range) 400-600 mg/L.hr   AUC24,ss: 527 mg/L.hr  Probability of AUC24 > 400: 76 %  Ctrough,ss: 15.4 mg/L  Probability of Ctrough,ss > " 20: 31 %  Probability of nephrotoxicity (Lodise PEDRO 2009): 11 %          Plan:  1. Give vancomycin 1750 mg once, then start vancomycin  1250 mg IV q12h.   2. Vancomycin monitoring method: AUC  3. Vancomycin therapeutic monitoring goal: 400-600 mg*h/L  4. Pharmacy will check vancomycin levels as appropriate in 1-3 Days.    5. Serum creatinine levels will be ordered daily for the first week of therapy and at least twice weekly for subsequent weeks.      Siri Gutierrez, PharmD, PGY1 Resident

## 2022-01-16 LAB
ERYTHROCYTE [DISTWIDTH] IN BLOOD BY AUTOMATED COUNT: 14 % (ref 10–15)
GLUCOSE BLDC GLUCOMTR-MCNC: 114 MG/DL (ref 70–99)
GLUCOSE BLDC GLUCOMTR-MCNC: 119 MG/DL (ref 70–99)
GLUCOSE BLDC GLUCOMTR-MCNC: 120 MG/DL (ref 70–99)
GLUCOSE BLDC GLUCOMTR-MCNC: 130 MG/DL (ref 70–99)
HCT VFR BLD AUTO: 33 % (ref 35–47)
HGB BLD-MCNC: 10.5 G/DL (ref 11.7–15.7)
MCH RBC QN AUTO: 26.6 PG (ref 26.5–33)
MCHC RBC AUTO-ENTMCNC: 31.8 G/DL (ref 31.5–36.5)
MCV RBC AUTO: 84 FL (ref 78–100)
PLATELET # BLD AUTO: 223 10E3/UL (ref 150–450)
POTASSIUM BLD-SCNC: 3.4 MMOL/L (ref 3.4–5.3)
RBC # BLD AUTO: 3.95 10E6/UL (ref 3.8–5.2)
WBC # BLD AUTO: 8.8 10E3/UL (ref 4–11)

## 2022-01-16 PROCEDURE — 250N000009 HC RX 250

## 2022-01-16 PROCEDURE — 258N000003 HC RX IP 258 OP 636: Performed by: INTERNAL MEDICINE

## 2022-01-16 PROCEDURE — 120N000001 HC R&B MED SURG/OB

## 2022-01-16 PROCEDURE — 99232 SBSQ HOSP IP/OBS MODERATE 35: CPT | Performed by: INTERNAL MEDICINE

## 2022-01-16 PROCEDURE — 250N000011 HC RX IP 250 OP 636: Performed by: INTERNAL MEDICINE

## 2022-01-16 PROCEDURE — 36415 COLL VENOUS BLD VENIPUNCTURE: CPT | Performed by: INTERNAL MEDICINE

## 2022-01-16 PROCEDURE — 85018 HEMOGLOBIN: CPT | Performed by: INTERNAL MEDICINE

## 2022-01-16 PROCEDURE — 250N000013 HC RX MED GY IP 250 OP 250 PS 637: Performed by: STUDENT IN AN ORGANIZED HEALTH CARE EDUCATION/TRAINING PROGRAM

## 2022-01-16 PROCEDURE — 84132 ASSAY OF SERUM POTASSIUM: CPT | Performed by: INTERNAL MEDICINE

## 2022-01-16 PROCEDURE — 250N000011 HC RX IP 250 OP 636: Performed by: HOSPITALIST

## 2022-01-16 RX ORDER — LORAZEPAM 0.5 MG/1
0.5 TABLET ORAL 3 TIMES DAILY PRN
Status: DISCONTINUED | OUTPATIENT
Start: 2022-01-16 | End: 2022-01-16

## 2022-01-16 RX ORDER — LORAZEPAM 2 MG/ML
.5-1 CONCENTRATE ORAL EVERY 6 HOURS PRN
Status: DISCONTINUED | OUTPATIENT
Start: 2022-01-16 | End: 2022-01-18 | Stop reason: HOSPADM

## 2022-01-16 RX ORDER — POTASSIUM CHLORIDE 1500 MG/1
40 TABLET, EXTENDED RELEASE ORAL ONCE
Status: DISCONTINUED | OUTPATIENT
Start: 2022-01-16 | End: 2022-01-17

## 2022-01-16 RX ORDER — MORPHINE SULFATE 20 MG/ML
10 SOLUTION ORAL
Status: DISCONTINUED | OUTPATIENT
Start: 2022-01-16 | End: 2022-01-18 | Stop reason: HOSPADM

## 2022-01-16 RX ADMIN — PIPERACILLIN SODIUM AND TAZOBACTAM SODIUM 3.38 G: 3; .375 INJECTION, POWDER, LYOPHILIZED, FOR SOLUTION INTRAVENOUS at 03:33

## 2022-01-16 RX ADMIN — ONDANSETRON 4 MG: 2 INJECTION INTRAMUSCULAR; INTRAVENOUS at 05:37

## 2022-01-16 RX ADMIN — PIPERACILLIN SODIUM AND TAZOBACTAM SODIUM 3.38 G: 3; .375 INJECTION, POWDER, LYOPHILIZED, FOR SOLUTION INTRAVENOUS at 22:38

## 2022-01-16 RX ADMIN — SODIUM CHLORIDE: 9 INJECTION, SOLUTION INTRAVENOUS at 03:33

## 2022-01-16 RX ADMIN — MAGNESIUM SULFATE HEPTAHYDRATE: 500 INJECTION, SOLUTION INTRAMUSCULAR; INTRAVENOUS at 21:09

## 2022-01-16 RX ADMIN — SODIUM CHLORIDE: 9 INJECTION, SOLUTION INTRAVENOUS at 19:18

## 2022-01-16 RX ADMIN — Medication 1 LOZENGE: at 15:20

## 2022-01-16 RX ADMIN — Medication 1 LOZENGE: at 09:49

## 2022-01-16 RX ADMIN — HYDROMORPHONE HYDROCHLORIDE 0.2 MG: 0.2 INJECTION, SOLUTION INTRAMUSCULAR; INTRAVENOUS; SUBCUTANEOUS at 05:37

## 2022-01-16 RX ADMIN — PIPERACILLIN SODIUM AND TAZOBACTAM SODIUM 3.38 G: 3; .375 INJECTION, POWDER, LYOPHILIZED, FOR SOLUTION INTRAVENOUS at 09:50

## 2022-01-16 RX ADMIN — PIPERACILLIN SODIUM AND TAZOBACTAM SODIUM 3.38 G: 3; .375 INJECTION, POWDER, LYOPHILIZED, FOR SOLUTION INTRAVENOUS at 16:28

## 2022-01-16 ASSESSMENT — ACTIVITIES OF DAILY LIVING (ADL)
ADLS_ACUITY_SCORE: 9
ADLS_ACUITY_SCORE: 11
ADLS_ACUITY_SCORE: 9
ADLS_ACUITY_SCORE: 11
ADLS_ACUITY_SCORE: 9

## 2022-01-16 ASSESSMENT — MIFFLIN-ST. JEOR: SCORE: 1346.25

## 2022-01-16 NOTE — PROGRESS NOTES
Pt is A&O x4. VSS on RA. SBA mobility. Dx. Of recurrent SBO r/t to carcinomatosis. TPN infusing per orders. NPO. IV ABO Zosyn administered. Throat pain from NG managed with chloraseptic lozenges. NG on LIS - output yellow/brown bile color.  Family present for palliative care discussion with Dr. Mendieta this shift. Discharge pending.

## 2022-01-16 NOTE — PLAN OF CARE
Stand by assist, alert and oriented with appropriate use of the call light.  Lung sounds clear, encouraged respiratory exercises.  Bowel sounds present, passing flatus, NPO, NG at intermittent suction.  Voiding with adequate urine output.  Complaints of some intermittent discomfort though able to sleep through the night.  Increase nausea/pain this morning resolved with zofran/dilaudid. Continue to monitor.

## 2022-01-16 NOTE — PLAN OF CARE
Orientation: A&Ox4  VS/ O2/ IV: VSS on RA, DANTE NS 75mL/hr, TPN 85 mL/hr  GI: BS hypoactive  Mobility: SBA  Pain mgmt: prn oxy  Diet: NPO  Bowel/ Bladder: voids in bedside commode  Labs/ BG: ,121  Drains/ Devices: G tube, NG tube  Skin: intact  CMS: intact  Tests/ Procedures: NG placed today  Consults/ Providers: hem/onc, gen surg  Discharge/ Plan: possible discharge in a few days

## 2022-01-16 NOTE — PROGRESS NOTES
Surgery Chart Check    NGT and G tube with 400ml out each. Continue Gtube to gravity drainage and NGT to LIS until output decreased and then clamping trial and removal of NGT if able or if patient desires removal for comfort. Gastrostomy tube can remain to gravity drainage. It is ok to use for medications but this will likely worsen patient's obstructive symptoms. Please call with any further questions or concerns.     Doreen Cantu MD  [facility]  [phone]

## 2022-01-16 NOTE — PROGRESS NOTES
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 Mesa     -found to have metastatic high-grade goblet cell carcinoma of the appendix on 7/27/2020 involving the right fallopian tube, ovary, uterus, pelvic peritoneum, omentum and right colon with 4/16 LNs positive at the time of diagnosis itself in July 2020.  Underwent hysterectomy, BSO, omentectomy, right hemicolectomy and tumor bulking 7/27/2020 which led to this diagnosis    -MSI stable/MMR proficient, low TMB, DENISE/JUAN wild type; Her-2neu non-amplified; NTRK negative; PD-L1 negative.  Positive PTEN mutation, ARID1A and a BRIP1 mutation     -FOLFOX 9/9/2020 to 2/10/2021    -CT scans 3/8/2021, 9/3/2021 showed no obvious evidence of metastatic disease    -developed SBO 12/2021 necessitating laparotomy on 12/13/2021 under the direction of Dr. Ibrahim which unfortunately showed diffuse carcinomatosis with involvement of the transverse colon, stomach, liver, small bowel and mesentery.  Peritoneal biopsy consistent with recurrent goblet cell adenocarcinoma; PD-L1 negative.  Had venting G-tube placed for palliation.  Started on TPN after port placement.      -seen by Dr. Mesa on 1/5/2022 who discussed plan for palliative chemotherapy with FOLFIRI beginning in about a week    -unfortunately developed recurrent small bowel obstruction - transferred from local hospital in [place], [place] to [facility] on 1/13/2022    -CT abd/pelvis 1/13 showed small bowel obstruction and several areas of transition in the lower abdomen and upper pelvis.  Tethering of several bowel loops    -not a candidate for further surgery.  Noted surgery recommendations and appreciate input     -unfortunately this is a difficult situation.  While she is young with no other significant medical issues that could preclude further chemotherapy treatments, she has had recurrent  "admissions for bowel obstruction.  Given her extent of disease even if she were to improve from current episode, she would be at risk for recurrent bowel obstruction.  Currently on TPN.  Has venting G-tube to gravity and s/p NGT placement on 1/15.  I would not personally favor further chemotherapy in this setting.  Low likelihood of response to immunotherapy (MMR proficient, PD-L1 negative and low TMB disease).  We discussed goals of care.  I told her my recommendation would be to transition to comfort cares/hospice.  Given how rapidly these events have unfolded I suspect she is overwhelmed by these developments.  I encouraged her to discuss this with her family as well.  We will continue to follow her with you.      Kalyan Feliciano MD                 Interval History:   NGT placed yesterday.  Reported some nausea.                Review of Systems:   As per subjective, otherwise 5 systems reviewed and negative.           Physical Exam:   Blood pressure (!) 140/100, pulse 92, temperature 98.8  F (37.1  C), temperature source Oral, resp. rate 18, height 1.575 m (5' 2\"), weight 71.8 kg (158 lb 4.6 oz), SpO2 100 %.      Vital Sign Ranges  Temperature Temp  Av.9  F (37.2  C)  Min: 98.8  F (37.1  C)  Max: 99  F (37.2  C)   Blood pressure Systolic (24hrs), Av , Min:125 , Max:140        Diastolic (24hrs), Av, Min:85, Max:100      Pulse Pulse  Av.3  Min: 92  Max: 107   Respirations Resp  Av  Min: 18  Max: 18   Pulse oximetry SpO2  Av.7 %  Min: 99 %  Max: 100 %         Intake/Output Summary (Last 24 hours) at 2022 1214  Last data filed at 2022 0600  Gross per 24 hour   Intake --   Output 725 ml   Net -725 ml       Constitutional:   No acute distress.   Skin:   No rashes, petechiae, or ecchymoses.   HEENT:   NGT in place   Neck:   Supple.   Lungs:   Clear to auscultation bilaterally.   Cardiovascular:   Regular rate and rhythm with no murmurs, rubs, or gallops.   Abdomen:   Venting " G-tube to gravity.  Bowel sounds not heard   Extremities:   No leg edema.   Neurological:    No focal motor or sensory deficits.            Medications:     No current outpatient medications on file.                Data:     Results for orders placed or performed during the hospital encounter of 01/13/22 (from the past 24 hour(s))   Glucose by meter   Result Value Ref Range    GLUCOSE BY METER POCT 128 (H) 70 - 99 mg/dL   Glucose by meter   Result Value Ref Range    GLUCOSE BY METER POCT 121 (H) 70 - 99 mg/dL   Glucose by meter   Result Value Ref Range    GLUCOSE BY METER POCT 119 (H) 70 - 99 mg/dL   CBC with platelets   Result Value Ref Range    WBC Count 8.8 4.0 - 11.0 10e3/uL    RBC Count 3.95 3.80 - 5.20 10e6/uL    Hemoglobin 10.5 (L) 11.7 - 15.7 g/dL    Hematocrit 33.0 (L) 35.0 - 47.0 %    MCV 84 78 - 100 fL    MCH 26.6 26.5 - 33.0 pg    MCHC 31.8 31.5 - 36.5 g/dL    RDW 14.0 10.0 - 15.0 %    Platelet Count 223 150 - 450 10e3/uL   Potassium   Result Value Ref Range    Potassium 3.4 3.4 - 5.3 mmol/L

## 2022-01-16 NOTE — PROGRESS NOTES
"[facility]    Hospitalist Progress Note    Assessment & Plan   41 year old female who was admitted on 1/13/2022 with recurrent SBO related to Carcinomatosis    Impression:   Principal Problem:    Recurrent/Persistent SBO (small bowel obstruction) -- S/P venting G-tube    -- has been restarted on TPN   -- following Dr. 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  Met with Oncology earlier and repeated no plans for Chemotherapy at this point.    -- patient requesting further chemotherapy, \"I'm not ready to die\", and mother reiterated \"we will do everything so that she can live as long as possible\".  Her daughter does not thing she has a bowel obstruction and wants a test to prove it -- mentioned we could do Gastrografin UGI with SBFT if wanted proof of bowel obstruction ... but will discussed with surgery tomorrow   -- patient and mother wanting a second opinion, will discuss with oncology tomorrow but can consult [facility]   -- anticipate removing NG tube at some point and just going with G-tube decompression     DVT Prophylaxis: Pneumatic Compression Devices  Code Status: Full Code    Disposition: Expected discharge home with TPN next week    Tio Rodriguez  Pager [phone]  Cell Phone [phone]  Text Page (7am to 6pm)  (35 min total)    Interval History   Overall feels better -- ?NG helping vs feels better with TPN.      Physical Exam   Temp: 98.8  F (37.1  C) Temp src: Oral BP: (!) 140/100 (BP was " checked on both arm, notified RN.) Pulse: 92   Resp: 18 SpO2: 100 % O2 Device: None (Room air)    Vitals:    01/13/22 2014 01/15/22 0612 01/16/22 0600   Weight: 66.7 kg (147 lb) 72.2 kg (159 lb 1.6 oz) 71.8 kg (158 lb 4.6 oz)     Vital Signs with Ranges  Temp:  [98.8  F (37.1  C)-99  F (37.2  C)] 98.8  F (37.1  C)  Pulse:  [] 92  Resp:  [18] 18  BP: (125-140)/() 140/100  SpO2:  [99 %-100 %] 100 %  I/O last 3 completed shifts:  In: -   Out: 1425 [Urine:600; Emesis/NG output:825]    # Pain Assessment:  Current Pain Score 1/15/2022   Patient currently in pain? yes   Pain score (0-10) -       Constitutional: Awake, alert, cooperative, sitting up in chair.  Has NG in place.   Respiratory: Clear to auscultation bilaterally, no crackles or wheezing  Cardiovascular: Regular rate and rhythm, normal S1 and S2, and no murmur noted  GI: bowels sounds present but decreased, moderate epigastric tenderness, no distension noted  Extrem: No calf tenderness, no ankle edema  Neuro: Ox3, no focal motor or sensory deficits    Medications     dextrose       parenteral nutrition - ADULT compounded formula       parenteral nutrition - ADULT compounded formula 85 mL/hr at 01/15/22 2059     sodium chloride 75 mL/hr at 01/16/22 0333       docusate sodium  200 mg Oral BID     insulin aspart  1-6 Units Subcutaneous BID     lipids  250 mL Intravenous Once per day on Mon Tue Wed Thu Fri     pantoprazole  40 mg Oral QAM AC     piperacillin-tazobactam  3.375 g Intravenous Q6H     potassium chloride  40 mEq Oral Once     potassium chloride  40 mEq Oral Daily     sodium chloride (PF)  3 mL Intracatheter Q8H     sodium chloride (PF)  3 mL Intracatheter Q8H       Data   Recent Labs   Lab 01/16/22  1208 01/16/22  0707 01/16/22  0221 01/15/22  2203 01/15/22  0805 01/15/22  0704 01/15/22  0609 01/15/22  0534 01/14/22  2301 01/14/22  0248 01/13/22 2047   WBC  --  8.8  --   --   --  11.2*  --   --   --  6.2 8.1   HGB  --  10.5*  --   --   --   10.6*  --   --   --  10.0* 12.1   MCV  --  84  --   --   --  82  --   --   --  84 83   PLT  --  223  --   --   --  231  --   --   --  213 244   INR  --   --   --   --   --   --   --  1.13  --   --  1.02   NA  --   --   --   --   --   --   --  136  --  141 138   POTASSIUM  --  3.4  --   --   --   --   --  3.4  --  3.8  3.8 3.3*   CHLORIDE  --   --   --   --   --   --   --  106  --  111* 106   CO2  --   --   --   --   --   --   --  26  --  26 27   BUN  --   --   --   --   --   --   --  9  --  10 13   CR  --   --   --   --   --   --   --  0.67  --  0.79 0.79   ANIONGAP  --   --   --   --   --   --   --  4  --  4 5   IRON  --   --   --   --   --   --   --  8.2*  --  8.2* 9.1   *  --  119* 121*   < >  --    < > 164*   < > 96 100*   ALBUMIN  --   --   --   --   --   --   --   --   --   --  3.0*   PROTTOTAL  --   --   --   --   --   --   --   --   --   --  7.0   BILITOTAL  --   --   --   --   --   --   --   --   --   --  0.6   ALKPHOS  --   --   --   --   --   --   --   --   --   --  104   ALT  --   --   --   --   --   --   --   --   --   --  117*   AST  --   --   --   --   --   --   --   --   --   --  85*   LIPASE  --   --   --   --   --   --   --   --   --   --  93    < > = values in this interval not displayed.       Imaging:   No results found for this or any previous visit (from the past 24 hour(s)).

## 2022-01-17 LAB
ALBUMIN SERPL-MCNC: 2.6 G/DL (ref 3.4–5)
ALP SERPL-CCNC: 93 U/L (ref 40–150)
ALT SERPL W P-5'-P-CCNC: 100 U/L (ref 0–50)
ANION GAP SERPL CALCULATED.3IONS-SCNC: 8 MMOL/L (ref 3–14)
AST SERPL W P-5'-P-CCNC: 35 U/L (ref 0–45)
BILIRUB SERPL-MCNC: 0.3 MG/DL (ref 0.2–1.3)
BUN SERPL-MCNC: 8 MG/DL (ref 7–30)
CALCIUM SERPL-MCNC: 8.8 MG/DL (ref 8.5–10.1)
CHLORIDE BLD-SCNC: 108 MMOL/L (ref 94–109)
CO2 SERPL-SCNC: 24 MMOL/L (ref 20–32)
CREAT SERPL-MCNC: 0.59 MG/DL (ref 0.52–1.04)
GFR SERPL CREATININE-BSD FRML MDRD: >90 ML/MIN/1.73M2
GLUCOSE BLD-MCNC: 124 MG/DL (ref 70–99)
GLUCOSE BLDC GLUCOMTR-MCNC: 107 MG/DL (ref 70–99)
GLUCOSE BLDC GLUCOMTR-MCNC: 125 MG/DL (ref 70–99)
GLUCOSE BLDC GLUCOMTR-MCNC: 97 MG/DL (ref 70–99)
MAGNESIUM SERPL-MCNC: 1.7 MG/DL (ref 1.6–2.3)
PHOSPHATE SERPL-MCNC: 2.7 MG/DL (ref 2.5–4.5)
POTASSIUM BLD-SCNC: 3.3 MMOL/L (ref 3.4–5.3)
PROT SERPL-MCNC: 6.4 G/DL (ref 6.8–8.8)
SODIUM SERPL-SCNC: 140 MMOL/L (ref 133–144)
TRIGL SERPL-MCNC: 85 MG/DL

## 2022-01-17 PROCEDURE — 250N000013 HC RX MED GY IP 250 OP 250 PS 637: Performed by: INTERNAL MEDICINE

## 2022-01-17 PROCEDURE — 250N000011 HC RX IP 250 OP 636: Performed by: INTERNAL MEDICINE

## 2022-01-17 PROCEDURE — 99232 SBSQ HOSP IP/OBS MODERATE 35: CPT | Performed by: PHYSICIAN ASSISTANT

## 2022-01-17 PROCEDURE — 250N000009 HC RX 250: Performed by: NURSE PRACTITIONER

## 2022-01-17 PROCEDURE — 120N000001 HC R&B MED SURG/OB

## 2022-01-17 PROCEDURE — 36415 COLL VENOUS BLD VENIPUNCTURE: CPT | Performed by: NURSE PRACTITIONER

## 2022-01-17 PROCEDURE — 80053 COMPREHEN METABOLIC PANEL: CPT | Performed by: NURSE PRACTITIONER

## 2022-01-17 PROCEDURE — 99232 SBSQ HOSP IP/OBS MODERATE 35: CPT | Performed by: INTERNAL MEDICINE

## 2022-01-17 PROCEDURE — 250N000011 HC RX IP 250 OP 636: Performed by: HOSPITALIST

## 2022-01-17 PROCEDURE — C9113 INJ PANTOPRAZOLE SODIUM, VIA: HCPCS | Performed by: INTERNAL MEDICINE

## 2022-01-17 PROCEDURE — 999N000128 HC STATISTIC PERIPHERAL IV START W/O US GUIDANCE

## 2022-01-17 PROCEDURE — 84478 ASSAY OF TRIGLYCERIDES: CPT | Performed by: NURSE PRACTITIONER

## 2022-01-17 PROCEDURE — 250N000009 HC RX 250: Performed by: HOSPITALIST

## 2022-01-17 PROCEDURE — 83735 ASSAY OF MAGNESIUM: CPT | Performed by: NURSE PRACTITIONER

## 2022-01-17 PROCEDURE — 84100 ASSAY OF PHOSPHORUS: CPT | Performed by: NURSE PRACTITIONER

## 2022-01-17 RX ORDER — POTASSIUM CHLORIDE 1.5 G/1.58G
20 POWDER, FOR SOLUTION ORAL 2 TIMES DAILY
Status: DISCONTINUED | OUTPATIENT
Start: 2022-01-17 | End: 2022-01-18

## 2022-01-17 RX ORDER — CLONIDINE HYDROCHLORIDE 0.1 MG/1
0.1 TABLET ORAL EVERY 6 HOURS PRN
Status: DISCONTINUED | OUTPATIENT
Start: 2022-01-17 | End: 2022-01-18 | Stop reason: HOSPADM

## 2022-01-17 RX ADMIN — POTASSIUM CHLORIDE 20 MEQ: 1.5 POWDER, FOR SOLUTION ORAL at 23:16

## 2022-01-17 RX ADMIN — I.V. FAT EMULSION 250 ML: 20 EMULSION INTRAVENOUS at 20:29

## 2022-01-17 RX ADMIN — MAGNESIUM SULFATE HEPTAHYDRATE: 500 INJECTION, SOLUTION INTRAMUSCULAR; INTRAVENOUS at 20:29

## 2022-01-17 RX ADMIN — ONDANSETRON 4 MG: 2 INJECTION INTRAMUSCULAR; INTRAVENOUS at 22:13

## 2022-01-17 RX ADMIN — POTASSIUM CHLORIDE 40 MEQ: 1500 TABLET, EXTENDED RELEASE ORAL at 08:41

## 2022-01-17 RX ADMIN — PIPERACILLIN SODIUM AND TAZOBACTAM SODIUM 3.38 G: 3; .375 INJECTION, POWDER, LYOPHILIZED, FOR SOLUTION INTRAVENOUS at 03:48

## 2022-01-17 RX ADMIN — PANTOPRAZOLE SODIUM 40 MG: 40 INJECTION, POWDER, FOR SOLUTION INTRAVENOUS at 08:41

## 2022-01-17 ASSESSMENT — ACTIVITIES OF DAILY LIVING (ADL)
ADLS_ACUITY_SCORE: 9

## 2022-01-17 NOTE — PROGRESS NOTES
SPIRITUAL HE[facility] Progress Note  [facility]    Length-of-Stay visit attempt.    Ptnt was receiving cares or not in room when I attempted visit.    I will try again tomorrow.    SH remains available.    Whitley Felder  Associate     Spiritual Health Phone Line [phone]  Spiritual Health Pager [phone]

## 2022-01-17 NOTE — PLAN OF CARE
Orientation: A&O x4  VS/ O2/ IV: VSS on RA, DANTE NS @ 75mL/hr  GI: BS present  Mobility: SBA  Pain mgmt: denied meds, rating 4/10  Diet: NPO, TPN infusing at 85mL/hr  Bowel/ Bladder: voids in bedside commode  Labs/ BG: , 114  Drains/ Devices: G tube to gravity, NG to low intermittent suction  Skin: intact, G tube placement  CMS: intact  Consults/ Providers: oncology, surgery, hem onc  Discharge/ Plan: discharge pending, antibiotics given  Other:  pt wanted to have NG removed due to pain, provider paged and discussed removal if patient desires. however pt may experience increased nausea and emesis due to G tube not being able to keep up with output. Patient aware and wanted to keep NG tube in for now at this time.

## 2022-01-17 NOTE — PROGRESS NOTES
Pt is A&O x4. VSS on RA except hypertensive BP noted end of shift. Shannon nurse notified. Ambulating in room, steady. Pt tolerating oral medications. CLD, tolerating. G-tube to gravity, change scheduled for tomorrow. New dressing to R PIV. TPN infusing per orders. IVF discontinued. Pt's mother in room most of shift. G-tube change scheduled for tomorrow morning. Discharge pending.

## 2022-01-17 NOTE — PROGRESS NOTES
"Surgery    No complaints this morning.   NG removed last night.  G-tube to gravity.   Reports multiple bm's.  Feels a little bloated intermittently  Walking to BR  Voiding ok.  Generally doing well    Awake, alert, comfortable and conversational  Vital signs:  Temp: 99  F (37.2  C) Temp src: Oral BP: (!) 149/107 Pulse: 85   Resp: 18 SpO2: 99 % O2 Device: None (Room air)   Height: 157.5 cm (5' 2\") Weight: 71.8 kg (158 lb 4.6 oz)  Estimated body mass index is 28.95 kg/m  as calculated from the following:    Height as of this encounter: 1.575 m (5' 2\").    Weight as of this encounter: 71.8 kg (158 lb 4.6 oz).    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 Recently s/p ex laparotomy with lysis of adhesions and G-tube placement on 12/13. Intraoperatively there was diffuse disease with carcinomatosis and involvement of the transverse colon, stomach, liver, small bowel, mesentery, and peritoneum.    - continue supportive care.   - patient knows we have no further intra abdominal surgical recourse.  - G-tube to gravity. If G-tube is not able to adequately decompress bowel, tube may be exchanged for larger size or different location by IR.  - Re: diet - PO intake is for comfort/pleasure only. May attempt liquids as tolerated.  - hopeful discharge soon.    Rambo Magaña PA-C    "

## 2022-01-17 NOTE — CONSULTS
Care Management Initial Consult    General Information  Assessment completed with: Patient,    Type of CM/SW Visit: CM Role Introduction    Primary Care Provider verified and updated as needed: Yes   Readmission within the last 30 days: no previous admission in last 30 days      Reason for Consult: discharge planning  Advance Care Planning:            Communication Assessment  Patient's communication style: spoken language (English or Bilingual)    Hearing Difficulty or Deaf: no   Wear Glasses or Blind: yes    Cognitive  Cognitive/Neuro/Behavioral: WDL           Mood/Behavior: calm          Living Environment:   People in home: sibling(s)     Current living Arrangements: house (patient is most likely staying locally with her sister 1 wk)      Able to return to prior arrangements: yes       Family/Social Support:  Care provided by: self  Provides care for: no one, unable/limited ability to care for self  Marital Status: Single  Parent(s),Sibling(s)          Description of Support System: Supportive,Involved    Support Assessment: Adequate family and caregiver support,Adequate social supports    Current Resources:   Patient receiving home care services: Yes ([facility] )  Skilled Home Care Services: Skilled Nursing  Community Resources: Home Care,Home Infusion  Equipment currently used at home: other (see comments) (tpn )  Supplies currently used at home: Enteral Nutrition & Supplies,Wound Care Supplies    Employment/Financial:  Employment Status: disabled        Financial Concerns: No concerns identified           Lifestyle & Psychosocial Needs:  Social Determinants of Health     Tobacco Use: Low Risk      Smoking Tobacco Use: Never Smoker     Smokeless Tobacco Use: Never Used   Alcohol Use: Not on file   Financial Resource Strain: Not on file   Food Insecurity: Not on file   Transportation Needs: Not on file   Physical Activity: Not on file   Stress: Not on file   Social Connections: Not on file   Intimate  Partner Violence: Not on file   Depression: Not on file   Housing Stability: Not on file       Functional Status:  Prior to admission patient needed assistance: independent  With ambulation, family provides transportation and assistance as needed.                         Additional Information:  Writer met with the patient regarding discharge planning.  Patient is A+Ox4 up independently.  Writer explained role and scope of safe discharge planning.  Patient informed this writer that she is going to see Dr. Mesa with [facility] next Tuesday.  She has Nocturnal TPN through [facility] and is open to Homecare RN through [facility] Phone#[phone] fax# [phone]. (lives in [place], [place]). Patient aware that she is most likely ready for discharge to home tomorrow with recommended oncology follow up and resumption of TPN and homecare.  Patient states that she will most likely be staying locally with her sister while she waits for her follow up with Dr. Mesa.  Writer will email [facility] regarding resumption of Home infusion and homecare while local?  Writer did explain to the patient that most homecare agencies locally have limited availability and we would need to check with [facility] for RN services locally (in [facility]) as the patients sister is working and has dialysis.    Patient aware that we will follow up with her tomorrow regarding the above.  Patient had no further questions at this time.    Franca Gamble RN

## 2022-01-17 NOTE — PROVIDER NOTIFICATION
January 17, 2022 / 9:54 AM Notified: Dr. Mendieta  regarding patient's potassium level: 3.3. Also paged MD regarding pt's request for advancing diet and using port for unit collects.     Orders received 0965: potassium supplement (packets, see MAR), clear liquid diet (advance slowly), and OK to use port for unit collects.

## 2022-01-17 NOTE — PROGRESS NOTES
Patient is on IR schedule today Monday 1/17/22 for a G tube exchange for a longer tube to help facilitate drainage with possible sedation .     -Patient is already NPO except for clear liquids.    -Consent was obtained from [place] after explaining the procedure, risks and benefits and consent is in IR. Mother was in the room.   -IR does not typically sedate for g tube exchange but after further discussion with patient if she is uncomfortable sedation can be used.     Discussed with Dr Cantu who feels the enfit connectors of the g tube are a contributing factor to decreased decompressive drainage. IR only has g tubes with enfit connectors. It appears also by imaging that the g tube is in the antrum and the deeper portion of the stomach has fluid in it. The portion of the stomach in between is compressed by dilated bowel. IR will try to put in a modified J tube as it is longer and can reach the deeper stomach to facilitate drainage.     Please contact the IR department at [phone] for procedural related questions.     Discussed with IR Dr Steele and Dr Huston today.    Thanks, Aleta [facility] Interventional Radiology CNP ([phone]) (phone [phone])

## 2022-01-17 NOTE — DISCHARGE SUMMARY
[facility]    Discharge Summary  Hospitalist    Date of Admission:  1/13/2022  Date of Discharge:  1/18/2022  Discharging Provider: Tio Rodriguez MD    Discharge Diagnoses   Principal Problem:    Recurrent SBO (small bowel obstruction) -- S/P venting G-tube       Appendiceal Adeno CA w Carcinomatosis -- S/P Right hemicolectomy, SHELDON/BSO 7/27/20      Hypokalemia -- replaced       Hypomagnesemia -- replaced       Anemia of chronic Disease       Fever -- resolved spontaneously, cultures negative       History of Present Illness   41 year old woman with Appendiceal Adenocarcinoma found at time of surgery on 7/27/20, S/P surgery and subsequent chemotherapy and followed by [facility], Dr. Mesa -- who presents with nausea and vomiting, despite having a G-tube recently placed which she can use to decompress her stomach as needed.      In the ED, AVSS, CBC and BMP were notable for K 3.3, alb 3.0, , AST 85, Glucose 100.  CT abdomen and pelvis showed:   Small bowel obstruction with several areas of transition in the lower abdomen and upper pelvis. Wall thickening in the region of transition. There is also wall thickening of adjacent sigmoid colon with tethering of loops of bowel.    Hospital Course   Admitted to Med-Surg floor, seen by surgery and did not suggest any surgical treatment options, but did place a NG tube when after 1-2 days was feeling bloated and nausea despite G-tube being open all the time to gravity drainage, suspect the G-tube getting intermittently obstructed, and was seen by IR and G-tube drain change completed on 1/18/2022 in hopes of better drainage when needed.      Was given Colace 100 mg bid to prevent constipation and help with bowel motility, and TPN was restarted while here.  Hgb did drop from 12. 1 to 10 with hydration, and was stable at 10.5 at discharge, suspect anemia of chronic disease.      On 1/15/22 she did spike a temp to 102.9, no source  "of fever identified, did get blood cultures and was started on IV Zosyn and Vancomycin -- subsequent cultures negative and antibiotics stopped with last dose 1/16/22, and not further fevers and patient felt well at time of discharge.     Was seen by Oncology, and initially explained she is not a good candidate for ongoing chemotherapy given her SBO and poor functional status -- but while here her SBO seemed to improve somewhat, with minimal pain and no nausea, and tolerating a small amount of clear liquids, and having small BM's.      She will follow-up with Dr. Mesa next week and discuss possible further chemotherapy.  Did bring up advanced directs -- patient and her mother wish her to live \"as long as possible\", she has 5 children and not interested in Palliative Care or Hospice at this time.     Tio Rodriguez MD  Pager: [phone]  Cell Phone:  [phone]       Significant Results and Procedures   As above    Pending Results   These results will be followed up by Dr. Rodriguez  Unresulted Labs Ordered in the Past 30 Days of this Admission     Date and Time Order Name Status Description    1/15/2022  7:56 AM Blood Culture Hand, Left Preliminary     1/15/2022  7:56 AM Blood Culture Hand, Right Preliminary     1/15/2022  6:42 AM Blood Culture Line, venous Preliminary           Code Status   Full Code       Primary Care Physician   MARISELA Murphy    Physical Exam   Temp: 97.9  F (36.6  C) Temp src: Oral BP: (!) 153/108 Pulse: 85   Resp: 12 SpO2: 99 % O2 Device: None (Room air)    Vitals:    01/15/22 0612 01/16/22 0600 01/18/22 0534   Weight: 72.2 kg (159 lb 1.6 oz) 71.8 kg (158 lb 4.6 oz) 67.9 kg (149 lb 11.2 oz)     Vital Signs with Ranges  Temp:  [97.9  F (36.6  C)-99.4  F (37.4  C)] 97.9  F (36.6  C)  Pulse:  [] 85  Resp:  [12-18] 12  BP: (150-158)/() 153/108  SpO2:  [99 %-100 %] 99 %  I/O last 3 completed shifts:  In: 1968   Out: 350 [Emesis/NG output:350]    Exam on discharge: "   Lungs clear  CV with reg S1S2  Abd -- minimal epigastric tenderness, rare bowel sounds, not distended.     Discharge Disposition   Discharged to home with Home care and Home Infusion   Condition at discharge: Guarded    Consultations This Hospital Stay   HEMATOLOGY & ONCOLOGY IP CONSULT  SURGERY GENERAL IP CONSULT  PHARMACY/NUTRITION TO START AND MANAGE TPN  PHARMACY IP CONSULT  PHARMACY IP CONSULT  PHARMACY IP CONSULT  PHARMACY/NUTRITION TO START AND MANAGE TPN  PHARMACY IP CONSULT  PHARMACY TO DOSE VANCO  CARE MANAGEMENT / SOCIAL WORK IP CONSULT    Time Spent on this Encounter   I spent a total of 35 minutes discharging this patient.     Discharge Orders      Home care nursing referral      Home infusion referral      Reason for your hospital stay    Nausea and vomiting related to partial Small Bowel Obstruction related to abdominal cancer.     Follow-up and recommended labs and tests     Follow up with Dr. Mesa with [facility] next Tuesday, 1/25/22, as scheduled.  Call [phone] if questions.      Call Dr. Mendieta if any medical questions at Cell Phone [phone].     Activity    Your activity upon discharge: activity as tolerated     MD face to face encounter    Documentation of Face to Face and Certification for Home Health Services    I certify that patient: Jefe Blanc is under my care and that I, or a nurse practitioner or physician's assistant working with me, had a face-to-face encounter that meets the physician face-to-face encounter requirements with this patient on: 1/18/2022.    This encounter with the patient was in whole, or in part, for the following medical condition, which is the primary reason for home health care: Partial bowel obstruction related to metastatic Appendiceal cancer with abdominal carcinomatosis.    I certify that, based on my findings, the following services are medically necessary home health services: Nursing.    My clinical findings support the need for  the above services because: Nurse is needed: To assess home safety and manage home TPN infusion, after changes in medications or other medical regimen..    Further, I certify that my clinical findings support that this patient is homebound (i.e. absences from home require considerable and taxing effort and are for medical reasons or [employer] services or infrequently or of short duration when for other reasons) because: Requires assistance of another person or specialized equipment to access medical services because patient:  has continuous IV medication..    Based on the above findings. I certify that this patient is confined to the home and needs intermittent skilled nursing care, physical therapy and/or speech therapy.  The patient is under my care, and I have initiated the establishment of the plan of care.  This patient will be followed by a physician who will periodically review the plan of care.  Physician/Provider to provide follow up care: Kimberly Huizar    Attending hospital physician (the Medicare certified PEC provider): Tio Rodriguez MD  Physician Signature: See electronic signature associated with these discharge orders.  Date: 1/18/2022     Tubes and drains    You are going home with the following tubes or drains: may open G-tube drain as needed to decompress stomach when nauseous or having abdominal pain.     Diet    Follow this diet upon discharge: Orders Placed This Encounter      Clear Liquid Diet as tolerated.  May try full liquids if feeling well.     Discharge Medications   Current Discharge Medication List      START taking these medications    Details   acetaminophen (TYLENOL) 325 MG tablet Take 2 tablets (650 mg) by mouth every 6 hours as needed for mild pain or other (and adjunct with moderate or severe pain or per patient request)  Refills: 0    Associated Diagnoses: SBO (small bowel obstruction) (H)      docusate sodium (COLACE) 100 MG capsule Take 2 capsules (200 mg) by  mouth 2 times daily  Qty: 120 capsule, Refills: 3    Associated Diagnoses: SBO (small bowel obstruction) (H)      hydrOXYzine (ATARAX) 25 MG tablet Take 1-2 tablets (25-50 mg) by mouth every 4 hours as needed for other (pain or nausea)  Qty: 40 tablet, Refills: 3    Associated Diagnoses: SBO (small bowel obstruction) (H)      potassium chloride ER (KLOR-CON M) 20 MEQ CR tablet Take 1 tablet (20 mEq) by mouth 2 times daily  Qty: 30 tablet, Refills: 1    Associated Diagnoses: Hypokalemia         CONTINUE these medications which have CHANGED    Details   parenteral nutrition - PTA/DISCHARGE ORDER The TPN formula will print on the After Visit Summary Report, along with IV lipids, 20% twice daily.  Qty: 1 each, Refills: 0    Associated Diagnoses: Small bowel obstruction (H)         CONTINUE these medications which have NOT CHANGED    Details   MELATONIN PO Take 1 tablet by mouth nightly as needed      ondansetron (ZOFRAN-ODT) 4 MG ODT tab Take 1-2 tablets (4-8 mg) by mouth every 8 hours as needed for nausea  Qty: 30 tablet, Refills: 3    Associated Diagnoses: Small bowel obstruction (H)      oxyCODONE (ROXICODONE) 5 MG tablet Take 5 mg by mouth every 6 hours as needed for severe pain           Allergies   Allergies   Allergen Reactions     Azithromycin      Erythromycin GI Disturbance and Other (See Comments)     Abdominal Pain  Vomiting       Lactose Nausea and Vomiting     Data   Most Recent 3 CBC's:Recent Labs   Lab Test 01/16/22  0707 01/15/22  0704 01/14/22  0248   WBC 8.8 11.2* 6.2   HGB 10.5* 10.6* 10.0*   MCV 84 82 84    231 213      Most Recent 3 BMP's:  Recent Labs   Lab Test 01/18/22  0538 01/17/22  1959 01/17/22  1556 01/17/22  0807 01/16/22  1208 01/16/22  0707 01/15/22  0609 01/15/22  0534 01/14/22  2301 01/14/22  0248   NA  --   --   --  140  --   --   --  136  --  141   POTASSIUM 3.3*  --   --  3.3*  --  3.4  --  3.4  --  3.8  3.8   CHLORIDE  --   --   --  108  --   --   --  106  --  111*   CO2   --   --   --  24  --   --   --  26  --  26   BUN  --   --   --  8  --   --   --  9  --  10   CR  --   --   --  0.59  --   --   --  0.67  --  0.79   ANIONGAP  --   --   --  8  --   --   --  4  --  4   IRON  --   --   --  8.8  --   --   --  8.2*  --  8.2*   GLC  --  114* 97 124*   < >  --    < > 164*   < > 96    < > = values in this interval not displayed.     Most Recent 2 LFT's:  Recent Labs   Lab Test 01/17/22  0807 01/13/22 2047   AST 35 85*   * 117*   ALKPHOS 93 104   BILITOTAL 0.3 0.6     Most Recent INR's and Anticoagulation Dosing History:  Anticoagulation Dose History     Recent Dosing and Labs Latest Ref Rng & Units 8/1/2020 9/2/2020 12/14/2021 12/15/2021 12/20/2021 1/13/2022 1/15/2022    INR 0.85 - 1.15 1.34(H) 1.07 1.13 1.06 0.99 1.02 1.13        Most Recent 3 Troponin's:No lab results found.  Most Recent Cholesterol Panel:  Recent Labs   Lab Test 01/17/22  0807   TRIG 85     Most Recent 6 Bacteria Isolates From Any Culture (See EPIC Reports for Culture Details):  Recent Labs   Lab Test 08/01/20  1315 07/31/20  2030 07/31/20  1727   CULT Light growth  Bacteroides fragilis  Susceptibility testing not routinely done  *  No growth No growth No growth     Most Recent TSH, T4 and A1c Labs:No lab results found.

## 2022-01-17 NOTE — PLAN OF CARE
Orientation: A&Ox4  VS/ O2/ IV: BP elevated, provider aware and prn med ordered however pt refused med, RA, R portacath infusing TPN 85mL/hr and lipids at 20.8mL/hr, PIV SL  GI: BS hypoactive  Mobility: SBA  Pain mgmt: declines  Diet: Clears  Bowel/ Bladder: voids in bathroom  Labs/ BG: BG 97, 114  Drains/ Devices: G tube to gravity  Skin: G tube  CMS: intact  Tests/ Procedures: possible change of G tube in IR 1-18  Consults/ Providers: Hem/onc, IR, surgery  Discharge/ Plan: discharge pending

## 2022-01-17 NOTE — PROGRESS NOTES
[facility]ology Progress Note     Primary Oncologist/Hematologist:  Dr. 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 Mesa     -found to have metastatic high-grade goblet cell carcinoma of the appendix on 7/27/2020 involving the right fallopian tube, ovary, uterus, pelvic peritoneum, omentum and right colon with 4/16 LNs positive at the time of diagnosis itself in July 2020.  Underwent hysterectomy, BSO, omentectomy, right hemicolectomy and tumor bulking 7/27/2020 which led to this diagnosis    -MSI stable/MMR proficient, low TMB, DENISE/JUAN wild type; Her-2neu non-amplified; NTRK negative; PD-L1 negative.  Positive PTEN mutation, ARID1A and a BRIP1 mutation     -FOLFOX 9/9/2020 to 2/10/2021    -CT scans 3/8/2021, 9/3/2021 showed no obvious evidence of metastatic disease    -developed SBO 12/2021 necessitating laparotomy on 12/13/2021 under the direction of Dr. Ibrahim which unfortunately showed diffuse carcinomatosis with involvement of the transverse colon, stomach, liver, small bowel and mesentery.  Peritoneal biopsy consistent with recurrent goblet cell adenocarcinoma; PD-L1 negative.  Had venting G-tube placed for palliation.  Started on TPN after port placement.      -seen by Dr. Mesa on 1/5/2022. She has a low likelihood of response to immunotherapy (MMR proficient, PD-L1 negative and low TMB disease). He  Discussed palliative chemotherapy with FOLFIRI  with plans to begin in about a week    -unfortunately developed recurrent small bowel obstruction - transferred from local hospital in [place], [place] to [facility] on 1/13/2022    -CT abd/pelvis 1/13 showed small bowel obstruction and several areas of transition in the lower abdomen and upper pelvis.  Tethering of several bowel loops    -not a candidate for further surgery.  Noted surgery recommendations and appreciate input     -Today, we  again reviewed goals of care.  Unfortunately this is a difficult situation.  While she is young with no other significant medical issues that could preclude further chemotherapy treatments, she has had recurrent admissions for bowel obstruction. Given her extent of disease even if she were to improve from current episode, she would be at risk for recurrent bowel obstruction.  Currently on TPN.  Has venting G-tube to gravity.  NGT has now been removed.   Ferny agrees that she has had bad days, but in her impression, she has had more good days than bad.   She does not feel that the quality of her life has been poor.  She remains strongly motivated to begin next-line chemo.  We can re=assess as an outpatient next week.  At this point, she is not interested in comfort measures.    We also talked about her interest in a second opinion.  I reviewed her case with Dr. Gunderson of  Oncology.  If she wishes to pursue a second opinion, he recommends that she be seen by his colleagues at [facility], who may see this type of malignancy with more frequency. Discussed with Ferny and her mom.  They would like to hold off for now and see Dr. Mesa next week in clinic.      I will ask our office to call and schedule and appointment .    Discussed with Dr. Mendieta.     TORRES Mike, AOAUDRAP  Nurse Practitioner  [facility]  [phone]          Interval History:     Feeling better today> NG tube is out.                Review of Systems:     The 5 point Review of Systems is negative other than noted in the HPI                Medications:   Scheduled Medications    docusate sodium  200 mg Oral BID     lipids  250 mL Intravenous Once per day on Mon Tue Wed Thu Fri     pantoprazole (PROTONIX) IV  40 mg Intravenous Daily with breakfast     potassium chloride  20 mEq Oral BID     sodium chloride (PF)  3 mL Intracatheter Q8H     sodium chloride (PF)  3 mL Intracatheter Q8H     PRN Medications  acetaminophen **OR**  "acetaminophen, sore throat lozenge, dextrose, glucose **OR** dextrose **OR** glucagon, HYDROmorphone, hydrOXYzine **OR** hydrOXYzine, lidocaine 4%, lidocaine (buffered or not buffered), LORazepam, morphine sulfate, naloxone **OR** naloxone **OR** naloxone **OR** naloxone, ondansetron **OR** ondansetron, oxyCODONE, prochlorperazine **OR** prochlorperazine **OR** prochlorperazine, sodium chloride (PF), sodium chloride (PF)               Physical Exam:   Vitals were reviewed  Blood pressure (!) 149/107, pulse 85, temperature 99  F (37.2  C), temperature source Oral, resp. rate 18, height 1.575 m (5' 2\"), weight 71.8 kg (158 lb 4.6 oz), SpO2 99 %.  Wt Readings from Last 4 Encounters:   01/16/22 71.8 kg (158 lb 4.6 oz)   12/20/21 66.9 kg (147 lb 7.8 oz)   04/15/21 66.7 kg (147 lb)   02/13/21 64.4 kg (142 lb)       I/O last 3 completed shifts:  In: 611 [I.V.:611]  Out: 2325 [Emesis/NG output:2325]               Data:   All laboratory data and imaging studies reviewed.    CMP  Recent Labs   Lab 01/17/22  0807 01/17/22  0638 01/17/22  0157 01/16/22  2126 01/16/22  1208 01/16/22  0707 01/15/22  0609 01/15/22  0534 01/14/22  2301 01/14/22  0248 01/13/22  2047     --   --   --   --   --   --  136  --  141 138   POTASSIUM 3.3*  --   --   --   --  3.4  --  3.4  --  3.8  3.8 3.3*   CHLORIDE 108  --   --   --   --   --   --  106  --  111* 106   CO2 24  --   --   --   --   --   --  26  --  26 27   ANIONGAP 8  --   --   --   --   --   --  4  --  4 5   * 125* 107* 114*   < >  --    < > 164*   < > 96 100*   BUN 8  --   --   --   --   --   --  9  --  10 13   CR 0.59  --   --   --   --   --   --  0.67  --  0.79 0.79   GFRESTIMATED >90  --   --   --   --   --   --  >90  --  >90 >90   IRON 8.8  --   --   --   --   --   --  8.2*  --  8.2* 9.1   MAG 1.7  --   --   --   --   --   --  1.6  --  1.5* 1.6   PHOS 2.7  --   --   --   --   --   --   --   --  4.3  --    PROTTOTAL 6.4*  --   --   --   --   --   --   --   --   --  7.0 "   ALBUMIN 2.6*  --   --   --   --   --   --   --   --   --  3.0*   BILITOTAL 0.3  --   --   --   --   --   --   --   --   --  0.6   ALKPHOS 93  --   --   --   --   --   --   --   --   --  104   AST 35  --   --   --   --   --   --   --   --   --  85*   *  --   --   --   --   --   --   --   --   --  117*    < > = values in this interval not displayed.     CBC  Recent Labs   Lab 01/16/22  0707 01/15/22  0704 01/14/22  0248 01/13/22 2047   WBC 8.8 11.2* 6.2 8.1   RBC 3.95 3.86 3.70* 4.54   HGB 10.5* 10.6* 10.0* 12.1   HCT 33.0* 31.7* 31.1* 37.8   MCV 84 82 84 83   MCH 26.6 27.5 27.0 26.7   MCHC 31.8 33.4 32.2 32.0   RDW 14.0 13.9 14.5 14.4    231 213 244     INR  Recent Labs   Lab 01/15/22  0534 01/13/22 2047   INR 1.13 1.02           BARBARA Carpenter  Nurse Practitioner  [facility]  [phone]

## 2022-01-17 NOTE — PROGRESS NOTES
MD Notification    Notified Person: MD    Notified Person Name: Dr. Cantu    Notification Date/Time: 1842 1-16-22    Notification Interaction: phone call page    Purpose of Notification: NG discomfort and possible removal    Orders Received: May have NG tube removed if patient desires. Discussed possibilities for increased nausea or emesis. Will discuss with patient.    Comments:

## 2022-01-17 NOTE — PROGRESS NOTES
MD Notification    Notified Person: MD    Notified Person Name: Dr. Mendieta    Notification Date/Time: 1603    Notification Interaction: web page    Purpose of Notification: BP elevated 158/121, 155/109 no prn BP meds, please advise.    Orders Received: prn clonidine ordered    Comments:

## 2022-01-17 NOTE — PROGRESS NOTES
CLINICAL NUTRITION SERVICES - REASSESSMENT NOTE      Future/Additional Recommendations:   Continue TPN as ordered. Discussed with PharmD.   Monitor weight trends and labs with ongoing TPN use.    Malnutrition:   % Weight Loss:  Weight loss does not meet criteria for malnutrition   % Intake:  No decreased intake noted - PN reliant x 1 month   Subcutaneous Fat Loss:  None observed  Muscle Loss:  None observed  Fluid Retention:  None noted     Malnutrition Diagnosis: Patient does not meet two of the above criteria necessary for diagnosing malnutrition       EVALUATION OF PROGRESS TOWARD GOALS   Diet:  NPO     Nutrition Support:  Pt on nocturnal 12 hour cyclic TPN regimen at home. Per MD request, TPN was altered to continuous regimen on 1/14. Continues as follows ~    Nutrition Support  Parenteral  Type of Access: Port   Parenteral Frequency: Continuous   Parenteral Regimen: Custom TPN (D12.3/AA4.17) at 85 mL/hr (2040 mL/day) + Lipids 250 mL 20% 5x/week  Total Parenteral Provisions: 1547 kcal (29 kcal/kg), 85 g protein (1.6 g/kg), 250 g CHO and 23% kcal from fat       Intake/Tolerance:   - K+ 3.3 (L) - replacement scheduled, Mg 1.7 (WNL), Phos 2.7 (WNL)  - TG 85 (WNL)  - -130 --> acceptable with TPN use   - BM x 2 on 1/16, x 5 on 1/15 --> colace being held appropriately   - NGT to LIS and GT to gravity: NGT w/ 1600 ml out on 1/16, 400 ml out on 1/15. GT w/ 375 ml out on 1/16, 175 ml out on 1/15. NGT w/ 600 ml out overnight, now removed this morning per pt request.   - Wt 71.8 kg/158# (1/16). Overall appears up 11# from admission though admission wt potentially inaccurate vs reported.     Vitals:    01/13/22 2014 01/15/22 0612 01/16/22 0600   Weight: 66.7 kg (147 lb) 72.2 kg (159 lb 1.6 oz) 71.8 kg (158 lb 4.6 oz)       ASSESSED NUTRITION NEEDS:  Dosing Weight:  54.2 kg (adjusted for overweight)  Estimated Energy Needs:  5786-9861 kcals (25-30 Kcal/Kg)  Justification: overweight  Estimated Protein Needs: 65-81  "grams protein (1.2-1.5 g pro/Kg)  Justification: hypercatabolism with acute illness and preservation of lean body mass  Estimated Fluid Needs: 1267-9703 mL (1 mL/Kcal)  Justification: maintenance      NEW FINDINGS:   - GI: Pt's daughter requesting \"proof\" of SBO. Possible gastrograffin challenge to be done.   - POC: Pt is not a candidate for further surgery. Possible plan for palliative chemo though pt at risk for recurrent bowel obstructions if she improves from current episode. Oncologist recommending hospice/comfort care.     Previous Goals:   Nocturnal TPN/Lipid will meet % estimated needs  Evaluation: Met    Previous Nutrition Diagnosis:   Inadequate parenteral nutrition infusion related to TPN on hold as evidenced by no TPN ran last night but plan resumption tonight.  Evaluation: Completed      CURRENT NUTRITION DIAGNOSIS  No nutrition diagnosis identified at this time while TPN + Lipids continue to meet 100% nutrition needs     INTERVENTIONS  Recommendations / Nutrition Prescription  Continue TPN as ordered. Discussed with PharmD.   Monitor weight trends and labs with ongoing TPN use.     Implementation  Collaboration and Referral of Nutrition care - Discussed with PharmD.     Goals  TPN + Lipids to continue to meet % estimated needs      MONITORING AND EVALUATION:  Progress towards goals will be monitored and evaluated per protocol and Practice Guidelines      Tila Pritchard, CINDY, LD    "

## 2022-01-17 NOTE — PRE-PROCEDURE
GENERAL PRE-PROCEDURE:   Procedure:  Gastrostomy (g) tube exchange to a longer g tube which reaches deeper into the stomach with possilbe intravenous moderate sedation   Date/Time:  1/17/2022 10:05 AM    Written consent obtained?: Yes    Risks and benefits: Risks, benefits and alternatives were discussed    Consent given by:  Patient  Patient states understanding of procedure being performed: Yes    Patient's understanding of procedure matches consent: Yes    Procedure consent matches procedure scheduled: Yes    Expected level of sedation:  Moderate  Appropriately NPO:  Yes  ASA Class:  3  Mallampati  :  Grade 2- soft palate, base of uvula, tonsillar pillars, and portion of posterior pharyngeal wall visible  Lungs:  Lungs clear with good breath sounds bilaterally  Heart:  Normal heart sounds and rate  History & Physical reviewed:  History and physical reviewed and no updates needed  Statement of review:  I have reviewed the lab findings, diagnostic data, medications, and the plan for sedation

## 2022-01-17 NOTE — PROGRESS NOTES
[facility]    Hospitalist Progress Note    Assessment & Plan   41 year old female who was admitted on 1/13/2022 with recurrent SBO related to Carcinomatosis    Impression:   Principal Problem:    Recurrent/Persistent SBO (small bowel obstruction) -- S/P venting G-tube    -- has been restarted on TPN   -- following Dr. Mesa with [facility], no chemo recommended given poor performance status related to SB)       Fever 2 days ago              -- started on IV Zosyn, await cultures, cultures negative, will stop antibiotics                Active Problems:    Appendiceal Adeno CA w Carcinomatosis -- S/P Right hemicolectomy, SHELDON/BSO 7/27/20   -- followed my [facility], given her poor functional status not sure if additional chemo an option, will wait for their final opinion      Plan:  Start small amount of clear liquids, and plan to discharge home with Home Care on TPN tomorrow if remains stable and follow-up with Dr. Mesa next Tues to discuss possible further chemotherapy.    DVT Prophylaxis: Pneumatic Compression Devices  Code Status: Full Code    Disposition: Expected discharge home with TPN next week    Tio Rodriguez  Pager [phone]  Cell Phone [phone]  Text Page (7am to 6pm)      Interval History   Overall feels better -- NG removed as it was causing irritation.  Having small BM's and currently reports no nausea or abd pain.      Physical Exam   Temp: 99  F (37.2  C) Temp src: Oral BP: (!) 149/107 Pulse: 85   Resp: 18 SpO2: 99 % O2 Device: None (Room air)    Vitals:    01/13/22 2014 01/15/22 0612 01/16/22 0600   Weight: 66.7 kg (147 lb) 72.2 kg (159 lb 1.6 oz) 71.8 kg (158 lb 4.6 oz)     Vital Signs with Ranges  Temp:  [99  F (37.2  C)-99.8  F (37.7  C)] 99  F (37.2  C)  Pulse:  [85-87] 85  Resp:  [18] 18  BP: (142-157)/() 149/107  SpO2:  [99 %-100 %] 99 %  I/O last 3 completed shifts:  In: 611 [I.V.:611]  Out: 2325 [Emesis/NG output:2325]    # Pain  Assessment:  Current Pain Score 1/17/2022   Patient currently in pain? denies   Pain score (0-10) -       Constitutional: Awake, alert, cooperative, sitting up in chair.  Has NG in place.   Respiratory: Clear to auscultation bilaterally, no crackles or wheezing  Cardiovascular: Regular rate and rhythm, normal S1 and S2, and no murmur noted  GI: bowels sounds present but decreased, no epigastric tenderness, no distension noted  Extrem: No calf tenderness, no ankle edema  Neuro: Ox3, no focal motor or sensory deficits    Medications     dextrose       parenteral nutrition - ADULT compounded formula 85 mL/hr at 01/16/22 2109       docusate sodium  200 mg Oral BID     lipids  250 mL Intravenous Once per day on Mon Tue Wed Thu Fri     pantoprazole (PROTONIX) IV  40 mg Intravenous Daily with breakfast     potassium chloride  20 mEq Oral BID     sodium chloride (PF)  3 mL Intracatheter Q8H     sodium chloride (PF)  3 mL Intracatheter Q8H       Data   Recent Labs   Lab 01/17/22  0807 01/17/22  0638 01/17/22  0157 01/16/22  1208 01/16/22  0707 01/15/22  0805 01/15/22  0704 01/15/22  0609 01/15/22  0534 01/14/22  2301 01/14/22  0248 01/13/22 2047   WBC  --   --   --   --  8.8  --  11.2*  --   --   --  6.2 8.1   HGB  --   --   --   --  10.5*  --  10.6*  --   --   --  10.0* 12.1   MCV  --   --   --   --  84  --  82  --   --   --  84 83   PLT  --   --   --   --  223  --  231  --   --   --  213 244   INR  --   --   --   --   --   --   --   --  1.13  --   --  1.02     --   --   --   --   --   --   --  136  --  141 138   POTASSIUM 3.3*  --   --   --  3.4  --   --   --  3.4  --  3.8  3.8 3.3*   CHLORIDE 108  --   --   --   --   --   --   --  106  --  111* 106   CO2 24  --   --   --   --   --   --   --  26  --  26 27   BUN 8  --   --   --   --   --   --   --  9  --  10 13   CR 0.59  --   --   --   --   --   --   --  0.67  --  0.79 0.79   ANIONGAP 8  --   --   --   --   --   --   --  4  --  4 5   IRON 8.8  --   --   --   --    --   --   --  8.2*  --  8.2* 9.1   * 125* 107*   < >  --    < >  --    < > 164*   < > 96 100*   ALBUMIN 2.6*  --   --   --   --   --   --   --   --   --   --  3.0*   PROTTOTAL 6.4*  --   --   --   --   --   --   --   --   --   --  7.0   BILITOTAL 0.3  --   --   --   --   --   --   --   --   --   --  0.6   ALKPHOS 93  --   --   --   --   --   --   --   --   --   --  104   *  --   --   --   --   --   --   --   --   --   --  117*   AST 35  --   --   --   --   --   --   --   --   --   --  85*   LIPASE  --   --   --   --   --   --   --   --   --   --   --  93    < > = values in this interval not displayed.       Imaging:   No results found for this or any previous visit (from the past 24 hour(s)).

## 2022-01-17 NOTE — PLAN OF CARE
Summary:   1-17-22 [id number]    A/O x4. VSS on RA ex /92. Pain rated at 8 twice. Removed NG tube per pt request, doc contacted last shift and gave the okay, pt very uncomfortable and requested it out. Nausea constant. G tube to gravity. IV kept beeding, put a new dressing on IV site. NS at 85. Intermittent antibiotics givenConsults/ Providers: oncology, surgery, hem onc. Discharge pending.

## 2022-01-18 ENCOUNTER — APPOINTMENT (OUTPATIENT)
Dept: INTERVENTIONAL RADIOLOGY/VASCULAR | Facility: CLINIC | Age: 42
End: 2022-01-18
Attending: SURGERY
Payer: COMMERCIAL

## 2022-01-18 ENCOUNTER — HOME INFUSION (PRE-WILLOW HOME INFUSION) (OUTPATIENT)
Dept: PHARMACY | Facility: CLINIC | Age: 42
End: 2022-01-18

## 2022-01-18 VITALS
HEIGHT: 62 IN | OXYGEN SATURATION: 100 % | BODY MASS INDEX: 27.55 KG/M2 | SYSTOLIC BLOOD PRESSURE: 148 MMHG | TEMPERATURE: 98.6 F | RESPIRATION RATE: 16 BRPM | WEIGHT: 149.7 LBS | HEART RATE: 86 BPM | DIASTOLIC BLOOD PRESSURE: 105 MMHG

## 2022-01-18 LAB
GLUCOSE BLDC GLUCOMTR-MCNC: 114 MG/DL (ref 70–99)
POTASSIUM BLD-SCNC: 3.3 MMOL/L (ref 3.4–5.3)

## 2022-01-18 PROCEDURE — 250N000011 HC RX IP 250 OP 636: Performed by: INTERNAL MEDICINE

## 2022-01-18 PROCEDURE — 0DH67UZ INSERTION OF FEEDING DEVICE INTO STOMACH, VIA NATURAL OR ARTIFICIAL OPENING: ICD-10-PCS | Performed by: RADIOLOGY

## 2022-01-18 PROCEDURE — C1769 GUIDE WIRE: HCPCS

## 2022-01-18 PROCEDURE — 250N000013 HC RX MED GY IP 250 OP 250 PS 637: Performed by: INTERNAL MEDICINE

## 2022-01-18 PROCEDURE — 250N000011 HC RX IP 250 OP 636: Performed by: HOSPITALIST

## 2022-01-18 PROCEDURE — 272N000581 ZZ HC TUBE GASTRO CR10

## 2022-01-18 PROCEDURE — 250N000011 HC RX IP 250 OP 636: Performed by: NURSE PRACTITIONER

## 2022-01-18 PROCEDURE — 250N000009 HC RX 250: Performed by: NURSE PRACTITIONER

## 2022-01-18 PROCEDURE — 99239 HOSP IP/OBS DSCHRG MGMT >30: CPT | Performed by: INTERNAL MEDICINE

## 2022-01-18 PROCEDURE — 99152 MOD SED SAME PHYS/QHP 5/>YRS: CPT

## 2022-01-18 PROCEDURE — C9113 INJ PANTOPRAZOLE SODIUM, VIA: HCPCS | Performed by: INTERNAL MEDICINE

## 2022-01-18 PROCEDURE — 99232 SBSQ HOSP IP/OBS MODERATE 35: CPT | Mod: 24 | Performed by: SURGERY

## 2022-01-18 PROCEDURE — 84132 ASSAY OF SERUM POTASSIUM: CPT | Performed by: INTERNAL MEDICINE

## 2022-01-18 RX ORDER — HEPARIN SODIUM,PORCINE 10 UNIT/ML
5-10 VIAL (ML) INTRAVENOUS EVERY 24 HOURS
Status: DISCONTINUED | OUTPATIENT
Start: 2022-01-18 | End: 2022-01-18 | Stop reason: HOSPADM

## 2022-01-18 RX ORDER — HEPARIN SODIUM (PORCINE) LOCK FLUSH IV SOLN 100 UNIT/ML 100 UNIT/ML
5-10 SOLUTION INTRAVENOUS
Status: DISCONTINUED | OUTPATIENT
Start: 2022-01-18 | End: 2022-01-18 | Stop reason: HOSPADM

## 2022-01-18 RX ORDER — ACETAMINOPHEN 325 MG/1
650 TABLET ORAL EVERY 6 HOURS PRN
Refills: 0
Start: 2022-01-18

## 2022-01-18 RX ORDER — NALOXONE HYDROCHLORIDE 0.4 MG/ML
0.4 INJECTION, SOLUTION INTRAMUSCULAR; INTRAVENOUS; SUBCUTANEOUS
Status: DISCONTINUED | OUTPATIENT
Start: 2022-01-18 | End: 2022-01-18 | Stop reason: HOSPADM

## 2022-01-18 RX ORDER — HEPARIN SODIUM,PORCINE 10 UNIT/ML
5-10 VIAL (ML) INTRAVENOUS
Status: DISCONTINUED | OUTPATIENT
Start: 2022-01-18 | End: 2022-01-18 | Stop reason: HOSPADM

## 2022-01-18 RX ORDER — POTASSIUM CHLORIDE 1500 MG/1
20 TABLET, EXTENDED RELEASE ORAL 2 TIMES DAILY
Qty: 30 TABLET | Refills: 1 | Status: SHIPPED | OUTPATIENT
Start: 2022-01-18

## 2022-01-18 RX ORDER — HYDROXYZINE HYDROCHLORIDE 25 MG/1
25-50 TABLET, FILM COATED ORAL EVERY 4 HOURS PRN
Qty: 40 TABLET | Refills: 3 | Status: SHIPPED | OUTPATIENT
Start: 2022-01-18

## 2022-01-18 RX ORDER — POTASSIUM CHLORIDE 1500 MG/1
20 TABLET, EXTENDED RELEASE ORAL 2 TIMES DAILY
Status: DISCONTINUED | OUTPATIENT
Start: 2022-01-18 | End: 2022-01-18 | Stop reason: HOSPADM

## 2022-01-18 RX ORDER — POTASSIUM CHLORIDE 1500 MG/1
40 TABLET, EXTENDED RELEASE ORAL ONCE
Status: COMPLETED | OUTPATIENT
Start: 2022-01-18 | End: 2022-01-18

## 2022-01-18 RX ORDER — NALOXONE HYDROCHLORIDE 0.4 MG/ML
0.2 INJECTION, SOLUTION INTRAMUSCULAR; INTRAVENOUS; SUBCUTANEOUS
Status: DISCONTINUED | OUTPATIENT
Start: 2022-01-18 | End: 2022-01-18 | Stop reason: HOSPADM

## 2022-01-18 RX ORDER — FLUMAZENIL 0.1 MG/ML
0.2 INJECTION, SOLUTION INTRAVENOUS
Status: DISCONTINUED | OUTPATIENT
Start: 2022-01-18 | End: 2022-01-18 | Stop reason: HOSPADM

## 2022-01-18 RX ORDER — FENTANYL CITRATE 50 UG/ML
25-50 INJECTION, SOLUTION INTRAMUSCULAR; INTRAVENOUS EVERY 5 MIN PRN
Status: DISCONTINUED | OUTPATIENT
Start: 2022-01-18 | End: 2022-01-18 | Stop reason: HOSPADM

## 2022-01-18 RX ORDER — POTASSIUM CHLORIDE 1.5 G/1.58G
20 POWDER, FOR SOLUTION ORAL ONCE
Status: DISCONTINUED | OUTPATIENT
Start: 2022-01-18 | End: 2022-01-18

## 2022-01-18 RX ORDER — DOCUSATE SODIUM 100 MG/1
200 CAPSULE, LIQUID FILLED ORAL 2 TIMES DAILY
Qty: 120 CAPSULE | Refills: 3 | Status: SHIPPED | OUTPATIENT
Start: 2022-01-18

## 2022-01-18 RX ADMIN — MIDAZOLAM 0.5 MG: 1 INJECTION INTRAMUSCULAR; INTRAVENOUS at 12:55

## 2022-01-18 RX ADMIN — MIDAZOLAM 1 MG: 1 INJECTION INTRAMUSCULAR; INTRAVENOUS at 12:49

## 2022-01-18 RX ADMIN — FENTANYL CITRATE 50 MCG: 50 INJECTION, SOLUTION INTRAMUSCULAR; INTRAVENOUS at 12:49

## 2022-01-18 RX ADMIN — SODIUM CHLORIDE 1 BAG: 9 INJECTION, SOLUTION INTRAVENOUS at 12:59

## 2022-01-18 RX ADMIN — POTASSIUM CHLORIDE 40 MEQ: 1500 TABLET, EXTENDED RELEASE ORAL at 15:57

## 2022-01-18 RX ADMIN — Medication 5 ML: at 15:58

## 2022-01-18 RX ADMIN — FENTANYL CITRATE 25 MCG: 50 INJECTION, SOLUTION INTRAMUSCULAR; INTRAVENOUS at 12:55

## 2022-01-18 RX ADMIN — PROCHLORPERAZINE EDISYLATE 10 MG: 5 INJECTION INTRAMUSCULAR; INTRAVENOUS at 00:13

## 2022-01-18 RX ADMIN — PANTOPRAZOLE SODIUM 40 MG: 40 INJECTION, POWDER, FOR SOLUTION INTRAVENOUS at 08:07

## 2022-01-18 ASSESSMENT — ACTIVITIES OF DAILY LIVING (ADL)
ADLS_ACUITY_SCORE: 9

## 2022-01-18 ASSESSMENT — MIFFLIN-ST. JEOR: SCORE: 1307.28

## 2022-01-18 NOTE — PLAN OF CARE
VSS. Reports abdominal fullness/bloating but no nausea, bloating improved after tube exchange. Abdomen rounded with hypoactive BS, reports passing some gas. 2 loose BMs today per pt report-mostly mucous. NPO this AM, trialing clears after procedure. On scheduled replacement of K+. Plan to administer following procedure if tolerates clears ok (needs to take with some applesauce). Port infusing TPN. Possible discharge later today pending recovery following tube exchange.

## 2022-01-18 NOTE — IR NOTE
Interventional Radiology Intra-procedural Nursing Note    Patient Name: Jefe Blanc  Medical Record Number: [id number]  Today's Date: January 18, 2022    Start Time: 1249  End of procedure time: 1303  Procedure: Gastrostomy tube exchange  Report given to RN    Other Notes: Pt into IR suite 1 via cart. Pt awake and alert. To table in supine position prepped and drapped with 2%. VSS. Tele NSR. Dr. Park in room. Time out and procedure started. Pt tolerated procedure well. Debrief with Dr. Park. G-tube placed. Dressing CDI. No complications. Pt transferred back to Gen surg. Report given over the phone.    Medications: Last sedation given at 1255    Versed 1.5mg  Fentanyl 75mcg  Lidocaine 1% 0ml    Megan Velasquez RN

## 2022-01-18 NOTE — PROVIDER NOTIFICATION
MD Notification    Notified Person: MD    Notified Person Name: Dr. Weldon    Notification Date/Time: 01/18/22 @ 0625    Notification Interaction: Phone Call    Purpose of Notification: Potassium 3.3, not on protocol    Orders Received: Continue with scheduled potassium, no need to place on protocol or give additional doses

## 2022-01-18 NOTE — PROGRESS NOTES
Surgery    Doing pretty well this morning.   She had nausea and emesis last evening after she was given a cup of liquid with potassium replacement.  G-tube remains on gravity drainage.     Limit any PO meds. She does not seem to tolerate clamping the g-tube so meds have decreased efficacy and volume from k+ solution is problematic.  Oral intake is for comfort/pleasure only. May attempt liquids as tolerated.  Anticipate G-tube upsize in radiology today.   Hopeful discharge later today.     Rambo Magaña PA-C

## 2022-01-18 NOTE — PRE-PROCEDURE
GENERAL PRE-PROCEDURE:   Procedure:  Consent for g tube exchange signed yesterday 1/17/22  Date/Time:  1/18/2022 12:09 PM    Written consent obtained?: Yes    Risks and benefits: Risks, benefits and alternatives were discussed    Consent given by:  Patient  Patient states understanding of procedure being performed: Yes    Patient's understanding of procedure matches consent: Yes    Procedure consent matches procedure scheduled: Yes    Expected level of sedation:  Moderate  Appropriately NPO:  Yes  ASA Class:  3  Mallampati  :  Grade 3- soft palate visible, posterior pharyngeal wall not visible  Lungs:  Lungs clear with good breath sounds bilaterally  Heart:  Normal heart sounds and rate  History & Physical reviewed:  History and physical reviewed and no updates needed  Statement of review:  I have reviewed the lab findings, diagnostic data, medications, and the plan for sedation

## 2022-01-18 NOTE — PROGRESS NOTES
Care Management Discharge Note    Discharge Date: 01/18/2022       Discharge Disposition: Home Infusion    Discharge Services: None    Discharge DME: None    Discharge Transportation: family or friend will provide    Private pay costs discussed: per home infusion guidelines    PAS Confirmation Code:    Patient/family educated on Medicare website which has current facility and service quality ratings: no    Education Provided on the Discharge Plan:    Persons Notified of Discharge Plans: MD, RN, Home infusion liaison  Patient/Family in Agreement with the Plan: yes    Handoff Referral Completed: No    Additional Information:  Discharge with home infusion services.    1526 clarified with home infusion liaison, no new teaching, resumption of care, supplies will be delivered tonight to her home. Liaison asks to keep the port accessed if possible. Updated bedside RN .       Melia Coon, RN   [facility]   Phone [phone]

## 2022-01-18 NOTE — PROGRESS NOTES
[facility] H[facility]ology Progress Note     Primary Oncologist/Hematologist:  Dr. 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 Mesa     -found to have metastatic high-grade goblet cell carcinoma of the appendix on 7/27/2020 involving the right fallopian tube, ovary, uterus, pelvic peritoneum, omentum and right colon with 4/16 LNs positive at the time of diagnosis itself in July 2020.  Underwent hysterectomy, BSO, omentectomy, right hemicolectomy and tumor bulking 7/27/2020 which led to this diagnosis    -MSI stable/MMR proficient, low TMB, DENISE/JUAN wild type; Her-2neu non-amplified; NTRK negative; PD-L1 negative.  Positive PTEN mutation, ARID1A and a BRIP1 mutation     -FOLFOX 9/9/2020 to 2/10/2021    -CT scans 3/8/2021, 9/3/2021 showed no obvious evidence of metastatic disease    -developed SBO 12/2021 necessitating laparotomy on 12/13/2021 under the direction of Dr. Ibrahim which unfortunately showed diffuse carcinomatosis with involvement of the transverse colon, stomach, liver, small bowel and mesentery.  Peritoneal biopsy consistent with recurrent goblet cell adenocarcinoma; PD-L1 negative.  Had venting G-tube placed for palliation.  Started on TPN after port placement.      -seen by Dr. Mesa on 1/5/2022. She has a low likelihood of response to immunotherapy (MMR proficient, PD-L1 negative and low TMB disease). He  dscussed palliative chemotherapy with FOLFIRI  with plans to begin in about a week    -unfortunately developed recurrent small bowel obstruction - transferred from local hospital in [place], [place] to [facility] on 1/13/2022    -CT abd/pelvis 1/13 showed small bowel obstruction and several areas of transition in the lower abdomen and upper pelvis.  Tethering of several bowel loops    -not a candidate for further surgery.  Noted surgery recommendations and appreciate input     -Today, we again  reviewed goals of care.  Unfortunately this is a difficult situation.  While she is young with no other significant medical issues that could preclude further chemotherapy treatments, she has had recurrent admissions for bowel obstruction. Given her extent of disease even if she were to improve from current episode, she would be at risk for recurrent bowel obstruction.  Currently on TPN.  Has venting G-tube to gravity.  NGT has now been removed.   Ferny agrees that she has had bad days, but in her impression, she has had more good days than bad.   She does not feel that the quality of her life has been poor.  She remains strongly motivated to begin next-line chemo.  We can re=assess as an outpatient next week.  At this point, she is not interested in comfort measures.     Yesterday we talked about her interest in a second opinion.  I reviewed her case with Dr. Gunderson of  Oncology.  If she wishes to pursue a second opinion, he recommends that she be seen by his colleagues at [facility], who may see this type of malignancy with more frequency. Discussed with Ferny and her mom.  They would like to hold off for now and see Dr. Mesa next week in clinic.      We reviewed goals of care again today. We discussed that chemotherapy could improve her performance status (if se responds) but could also adversely impact her performance. She remains motivated to try to treat her cancer.  She has an appointment with Dr. Mesa on Jan 26. If he concurs, she could begin chemo that day (this is a 3 day regimen which would include chemo Weds, Thurs, Fri). .  With TPN infusing into her port, she will need another site of IV access for her infusional chemo.  I will have our office arrange an outpatient PICC line placement early next week, preceding her visit with Dr. Mesa.  If her performance status declines over the weekend, she will call me and we will cancel the PICC placement.     Ferny and her mom verbalize understanding.   "    Ferny is hoping to be discharged later today after her G tube is assessed by IR.  She plans to stay here in the [place] versus returning to her home in [place].        TORRES Mike, CELINEP  Nurse Practitioner  [facility]  [phone]          Interval History:     Feeling better today.  Smiling brightly.              Review of Systems:     The 5 point Review of Systems is negative other than noted in the HPI                Medications:   Scheduled Medications    docusate sodium  200 mg Oral BID     lipids  250 mL Intravenous Once per day on Mon Tue Wed Thu Fri     pantoprazole (PROTONIX) IV  40 mg Intravenous Daily with breakfast     potassium chloride  20 mEq Oral BID     potassium chloride  40 mEq Oral Once     sodium chloride (PF)  3 mL Intracatheter Q8H     PRN Medications  acetaminophen **OR** acetaminophen, sore throat lozenge, cloNIDine, dextrose, glucose **OR** dextrose **OR** glucagon, HYDROmorphone, hydrOXYzine **OR** hydrOXYzine, lidocaine 4%, lidocaine (buffered or not buffered), LORazepam, morphine sulfate, naloxone **OR** naloxone **OR** naloxone **OR** naloxone, ondansetron **OR** ondansetron, oxyCODONE, prochlorperazine **OR** prochlorperazine **OR** prochlorperazine, sodium chloride (PF), sodium chloride (PF)               Physical Exam:   Vitals were reviewed  Blood pressure (!) 147/97, pulse 73, temperature 98.7  F (37.1  C), temperature source Oral, resp. rate 12, height 1.575 m (5' 2\"), weight 67.9 kg (149 lb 11.2 oz), SpO2 100 %.  Wt Readings from Last 4 Encounters:   01/18/22 67.9 kg (149 lb 11.2 oz)   12/20/21 66.9 kg (147 lb 7.8 oz)   04/15/21 66.7 kg (147 lb)   02/13/21 64.4 kg (142 lb)       I/O last 3 completed shifts:  In: 1968   Out: 350 [Emesis/NG output:350]                 Data:   All laboratory data and imaging studies reviewed.    CMP  Recent Labs   Lab 01/18/22  0538 01/17/22  1959 01/17/22  1556 01/17/22  0807 01/17/22  0638 01/16/22  1208 01/16/22  0707 " 01/15/22  0609 01/15/22  0534 01/14/22  2301 01/14/22  0248 01/13/22 2047   NA  --   --   --  140  --   --   --   --  136  --  141 138   POTASSIUM 3.3*  --   --  3.3*  --   --  3.4  --  3.4  --  3.8  3.8 3.3*   CHLORIDE  --   --   --  108  --   --   --   --  106  --  111* 106   CO2  --   --   --  24  --   --   --   --  26  --  26 27   ANIONGAP  --   --   --  8  --   --   --   --  4  --  4 5   GLC  --  114* 97 124* 125*   < >  --    < > 164*   < > 96 100*   BUN  --   --   --  8  --   --   --   --  9  --  10 13   CR  --   --   --  0.59  --   --   --   --  0.67  --  0.79 0.79   GFRESTIMATED  --   --   --  >90  --   --   --   --  >90  --  >90 >90   IRON  --   --   --  8.8  --   --   --   --  8.2*  --  8.2* 9.1   MAG  --   --   --  1.7  --   --   --   --  1.6  --  1.5* 1.6   PHOS  --   --   --  2.7  --   --   --   --   --   --  4.3  --    PROTTOTAL  --   --   --  6.4*  --   --   --   --   --   --   --  7.0   ALBUMIN  --   --   --  2.6*  --   --   --   --   --   --   --  3.0*   BILITOTAL  --   --   --  0.3  --   --   --   --   --   --   --  0.6   ALKPHOS  --   --   --  93  --   --   --   --   --   --   --  104   AST  --   --   --  35  --   --   --   --   --   --   --  85*   ALT  --   --   --  100*  --   --   --   --   --   --   --  117*    < > = values in this interval not displayed.     CBC  Recent Labs   Lab 01/16/22  0707 01/15/22  0704 01/14/22  0248 01/13/22 2047   WBC 8.8 11.2* 6.2 8.1   RBC 3.95 3.86 3.70* 4.54   HGB 10.5* 10.6* 10.0* 12.1   HCT 33.0* 31.7* 31.1* 37.8   MCV 84 82 84 83   MCH 26.6 27.5 27.0 26.7   MCHC 31.8 33.4 32.2 32.0   RDW 14.0 13.9 14.5 14.4    231 213 244     INR  Recent Labs   Lab 01/15/22  0534 01/13/22 2047   INR 1.13 1.02           BARBARA Carpenter  Nurse Practitioner  [facility]  [phone]

## 2022-01-18 NOTE — PROGRESS NOTES
"SPIRITUAL HEALTH SERVICES Progress Note  [facility] 22    Length-of-Stay visit with Ptnt Ferny and her mother.    Ferny shared that she's hoping she may yet discharge today. She is also hoping to begin chemo but acknowledged \"it's still up in the air.\"    Her mother said she is happy to be retired and able to come up to assist and support Ferny.    Ferny said she's \"doing pretty well\" and had no needs at this time.    I offered reflective conversation and emotional support, and sang a blessing.    SH remains available.    Whitley Felder  Associate     Spiritual Health Phone Line [phone]  Spiritual Health Pager [phone]  "

## 2022-01-18 NOTE — PROGRESS NOTES
[facility]    Jefe is anticipated to discharge to her sister's house tonight for resumption of home TPN. [facility] will deliver TPN and supplies to pt's sister's home tonight around 1900h. Jefe and her family are independent with home TPN. [facility] will follow for home RN for port care and labs until she returns to [place], [place] where [facility] will resume services.     Thank you     Pamela Hagan RN  [facility] Liaison  [phone] (Mon thru Fri 8am - 5pm)  [phone] Office

## 2022-01-18 NOTE — PLAN OF CARE
Patient is A&Ox4. VSS ex hypertensive. Denies pain. Endorses nausea, had emesis, gave IV compazine. Up SBA. R port infusing TPN and lipids at 85 ml/hr, good blood return noted. Gtube to gravity with green tenacious output. L PIV SL. Discharge pending home care and home infusion. Calls appropriately.

## 2022-01-19 ENCOUNTER — PATIENT OUTREACH (OUTPATIENT)
Dept: CARE COORDINATION | Facility: CLINIC | Age: 42
End: 2022-01-19
Payer: COMMERCIAL

## 2022-01-19 ENCOUNTER — HOME INFUSION (PRE-WILLOW HOME INFUSION) (OUTPATIENT)
Dept: PHARMACY | Facility: CLINIC | Age: 42
End: 2022-01-19
Payer: COMMERCIAL

## 2022-01-19 DIAGNOSIS — Z71.89 OTHER SPECIFIED COUNSELING: ICD-10-CM

## 2022-01-19 NOTE — PROGRESS NOTES
Pt discharged home with transportation provided by sister. Pt stable at time of discharge. Port flushed with heparin and left accessed before discharge. TPN stopped. AVS was provided to pt and education regarding follow up care was provided. Pt demonstrated understanding using teach back.

## 2022-01-19 NOTE — PROGRESS NOTES
"Clinic Care Coordination Contact  [facility]: Post-Discharge Note  SITUATION                                                      Admission:    Admission Date: 01/13/22   Reason for Admission: Nausea and Vomiting  Discharge:   Discharge Date: 01/18/22  Discharge Diagnosis: Recurrent SBO    BACKGROUND                                                      Admitted to [facility] floor, seen by surgery and did not suggest any surgical treatment options, but did place a NG tube when after 1-2 days was feeling bloated and nausea despite G-tube being open all the time to gravity drainage, suspect the G-tube getting intermittently obstructed, and was seen by IR and G-tube drain change completed on 1/18/2022 in hopes of better drainage when needed.       Was given Colace 100 mg bid to prevent constipation and help with bowel motility, and TPN was restarted while here.  Hgb did drop from 12. 1 to 10 with hydration, and was stable at 10.5 at discharge, suspect anemia of chronic disease.       On 1/15/22 she did spike a temp to 102.9, no source of fever identified, did get blood cultures and was started on IV Zosyn and Vancomycin -- subsequent cultures negative and antibiotics stopped with last dose 1/16/22, and not further fevers and patient felt well at time of discharge.      Was seen by Oncology, and initially explained she is not a good candidate for ongoing chemotherapy given her SBO and poor functional status -- but while here her SBO seemed to improve somewhat, with minimal pain and no nausea, and tolerating a small amount of clear liquids, and having small BM's    ASSESSMENT      Enrollment  Primary Care Care Coordination Status: Not a Candidate    Discharge Assessment  How are you doing now that you are home?: \" I am doing well\"  How are your symptoms? (Red Flag symptoms escalate to triage hotline per guidelines): Improved  Do you feel your condition is stable enough to be safe at home until your provider visit?: " Yes  Does the patient have their discharge instructions? : Yes  Does the patient have questions regarding their discharge instructions? : No  Does the patient have all of their medications?: Yes  Do you have questions regarding any of your medications? : No  Do you have all of your needed medical supplies or equipment (DME)?  (i.e. oxygen tank, CPAP, cane, etc.): Yes  Discharge follow-up appointment scheduled within 14 calendar days? : Yes  Discharge Follow Up Appointment Date: 01/19/22  Discharge Follow Up Appointment Scheduled with?: Specialty Care Provider    Post-op ([facility] Only)  If the patient had a surgery or procedure, do they have any questions for a nurse?: No             PLAN                                                      Outpatient Plan: Follow up with Dr. Mesa with [facility] next Tuesday, 1/25/22, as scheduled.  Call [phone] if questions.       Call Dr. Mendieta if any medical questions at Cell Phone [phone].    Future Appointments   Date Time Provider Department Center   1/24/2022  1:15 PM JUSTIN MONTE         For any urgent concerns, please contact our 24 hour nurse triage line: [phone] ([phone])         Anthony Madrid

## 2022-01-20 ENCOUNTER — LAB REQUISITION (OUTPATIENT)
Dept: LAB | Facility: CLINIC | Age: 42
End: 2022-01-20
Payer: COMMERCIAL

## 2022-01-20 ENCOUNTER — HOME INFUSION (PRE-WILLOW HOME INFUSION) (OUTPATIENT)
Dept: PHARMACY | Facility: CLINIC | Age: 42
End: 2022-01-20

## 2022-01-20 DIAGNOSIS — K56.609 UNSPECIFIED INTESTINAL OBSTRUCTION, UNSPECIFIED AS TO PARTIAL VERSUS COMPLETE OBSTRUCTION (H): ICD-10-CM

## 2022-01-20 LAB
ALBUMIN SERPL-MCNC: 2.8 G/DL (ref 3.4–5)
ALP SERPL-CCNC: 97 U/L (ref 40–150)
ALT SERPL W P-5'-P-CCNC: 67 U/L (ref 0–50)
ANION GAP SERPL CALCULATED.3IONS-SCNC: 6 MMOL/L (ref 3–14)
AST SERPL W P-5'-P-CCNC: 28 U/L (ref 0–45)
BACTERIA BLD CULT: NO GROWTH
BASOPHILS # BLD AUTO: 0.1 10E3/UL (ref 0–0.2)
BASOPHILS NFR BLD AUTO: 1 %
BILIRUB DIRECT SERPL-MCNC: <0.1 MG/DL (ref 0–0.2)
BILIRUB SERPL-MCNC: 0.3 MG/DL (ref 0.2–1.3)
BILIRUB SERPL-MCNC: 0.3 MG/DL (ref 0.2–1.3)
BUN SERPL-MCNC: 18 MG/DL (ref 7–30)
CALCIUM SERPL-MCNC: 9 MG/DL (ref 8.5–10.1)
CHLORIDE BLD-SCNC: 105 MMOL/L (ref 94–109)
CO2 SERPL-SCNC: 27 MMOL/L (ref 20–32)
CREAT SERPL-MCNC: 0.55 MG/DL (ref 0.52–1.04)
EOSINOPHIL # BLD AUTO: 0.1 10E3/UL (ref 0–0.7)
EOSINOPHIL NFR BLD AUTO: 1 %
ERYTHROCYTE [DISTWIDTH] IN BLOOD BY AUTOMATED COUNT: 14.2 % (ref 10–15)
FASTING STATUS PATIENT QL REPORTED: NORMAL
GFR SERPL CREATININE-BSD FRML MDRD: >90 ML/MIN/1.73M2
GLUCOSE BLD-MCNC: 82 MG/DL (ref 70–99)
HCT VFR BLD AUTO: 33.2 % (ref 35–47)
HGB BLD-MCNC: 11.2 G/DL (ref 11.7–15.7)
HOLD SPECIMEN: NORMAL
IMM GRANULOCYTES # BLD: 0.1 10E3/UL
IMM GRANULOCYTES NFR BLD: 1 %
LYMPHOCYTES # BLD AUTO: 2.7 10E3/UL (ref 0.8–5.3)
LYMPHOCYTES NFR BLD AUTO: 29 %
MAGNESIUM SERPL-MCNC: 1.9 MG/DL (ref 1.6–2.3)
MCH RBC QN AUTO: 27.1 PG (ref 26.5–33)
MCHC RBC AUTO-ENTMCNC: 33.7 G/DL (ref 31.5–36.5)
MCV RBC AUTO: 80 FL (ref 78–100)
MONOCYTES # BLD AUTO: 0.7 10E3/UL (ref 0–1.3)
MONOCYTES NFR BLD AUTO: 8 %
NEUTROPHILS # BLD AUTO: 5.7 10E3/UL (ref 1.6–8.3)
NEUTROPHILS NFR BLD AUTO: 60 %
NRBC # BLD AUTO: 0 10E3/UL
NRBC BLD AUTO-RTO: 0 /100
PHOSPHATE SERPL-MCNC: 3.2 MG/DL (ref 2.5–4.5)
PLATELET # BLD AUTO: 338 10E3/UL (ref 150–450)
POTASSIUM BLD-SCNC: 4 MMOL/L (ref 3.4–5.3)
PROT SERPL-MCNC: 7 G/DL (ref 6.8–8.8)
RBC # BLD AUTO: 4.13 10E6/UL (ref 3.8–5.2)
SODIUM SERPL-SCNC: 138 MMOL/L (ref 133–144)
TRIGL SERPL-MCNC: 59 MG/DL
WBC # BLD AUTO: 9.3 10E3/UL (ref 4–11)

## 2022-01-20 PROCEDURE — 82248 BILIRUBIN DIRECT: CPT | Performed by: SURGERY

## 2022-01-20 PROCEDURE — 84478 ASSAY OF TRIGLYCERIDES: CPT | Performed by: SURGERY

## 2022-01-20 PROCEDURE — 85025 COMPLETE CBC W/AUTO DIFF WBC: CPT | Performed by: SURGERY

## 2022-01-20 PROCEDURE — 83735 ASSAY OF MAGNESIUM: CPT | Performed by: SURGERY

## 2022-01-20 PROCEDURE — 80053 COMPREHEN METABOLIC PANEL: CPT | Performed by: SURGERY

## 2022-01-20 PROCEDURE — 84100 ASSAY OF PHOSPHORUS: CPT | Performed by: SURGERY

## 2022-01-21 ENCOUNTER — HOME INFUSION (PRE-WILLOW HOME INFUSION) (OUTPATIENT)
Dept: PHARMACY | Facility: CLINIC | Age: 42
End: 2022-01-21
Payer: COMMERCIAL

## 2022-01-24 ENCOUNTER — HOSPITAL ENCOUNTER (OUTPATIENT)
Dept: PET IMAGING | Facility: CLINIC | Age: 42
Discharge: HOME | End: 2022-01-24
Attending: INTERNAL MEDICINE | Admitting: INTERNAL MEDICINE
Payer: COMMERCIAL

## 2022-01-24 DIAGNOSIS — C18.1 APPENDIX CARCINOMA (H): ICD-10-CM

## 2022-01-24 PROCEDURE — 78816 PET IMAGE W/CT FULL BODY: CPT | Mod: 26 | Performed by: RADIOLOGY

## 2022-01-24 PROCEDURE — A9552 F18 FDG: HCPCS | Performed by: INTERNAL MEDICINE

## 2022-01-24 PROCEDURE — 250N000011 HC RX IP 250 OP 636: Performed by: INTERNAL MEDICINE

## 2022-01-24 PROCEDURE — 343N000001 HC RX 343: Performed by: INTERNAL MEDICINE

## 2022-01-24 PROCEDURE — 78816 PET IMAGE W/CT FULL BODY: CPT | Mod: PI

## 2022-01-24 RX ORDER — HEPARIN SODIUM (PORCINE) LOCK FLUSH IV SOLN 100 UNIT/ML 100 UNIT/ML
500 SOLUTION INTRAVENOUS EVERY 8 HOURS
Status: DISCONTINUED | OUTPATIENT
Start: 2022-01-24 | End: 2022-01-25 | Stop reason: HOSPADM

## 2022-01-24 RX ADMIN — Medication 500 UNITS: at 12:41

## 2022-01-24 RX ADMIN — FLUDEOXYGLUCOSE F-18 12.2 MCI.: 500 INJECTION, SOLUTION INTRAVENOUS at 12:11

## 2022-01-24 NOTE — PROGRESS NOTES
This is a recent snapshot of the patient's [facility] medical record.  For current drug dose and complete information and questions, call [phone] or In Basket pool, fv home infusion ([id number])  CSN Number:  [id number]

## 2022-01-24 NOTE — PROGRESS NOTES
This is a recent snapshot of the patient's [facility] medical record.  For current drug dose and complete information and questions, call [phone] or In Basket pool, fv home infusion ([id number])  CSN Number:  [id number]

## 2022-01-25 NOTE — PROGRESS NOTES
This is a recent snapshot of the patient's [facility] medical record.  For current drug dose and complete information and questions, call [phone] or In Basket pool, fv home infusion ([id number])  CSN Number:  [id number]

## 2022-01-26 ENCOUNTER — LAB REQUISITION (OUTPATIENT)
Dept: LAB | Facility: CLINIC | Age: 42
End: 2022-01-26
Payer: COMMERCIAL

## 2022-01-26 ENCOUNTER — HOME INFUSION (PRE-WILLOW HOME INFUSION) (OUTPATIENT)
Dept: PHARMACY | Facility: CLINIC | Age: 42
End: 2022-01-26

## 2022-01-26 LAB
ALBUMIN SERPL-MCNC: 3 G/DL (ref 3.4–5)
ALP SERPL-CCNC: 247 U/L (ref 40–150)
ALT SERPL W P-5'-P-CCNC: 433 U/L (ref 0–50)
ANION GAP SERPL CALCULATED.3IONS-SCNC: 6 MMOL/L (ref 3–14)
AST SERPL W P-5'-P-CCNC: 174 U/L (ref 0–45)
BASOPHILS # BLD AUTO: 0 10E3/UL (ref 0–0.2)
BASOPHILS NFR BLD AUTO: 0 %
BILIRUB DIRECT SERPL-MCNC: 0.6 MG/DL (ref 0–0.2)
BILIRUB SERPL-MCNC: 1 MG/DL (ref 0.2–1.3)
BUN SERPL-MCNC: 16 MG/DL (ref 7–30)
CALCIUM SERPL-MCNC: 9.8 MG/DL (ref 8.5–10.1)
CHLORIDE BLD-SCNC: 105 MMOL/L (ref 94–109)
CO2 SERPL-SCNC: 27 MMOL/L (ref 20–32)
CREAT SERPL-MCNC: 0.52 MG/DL (ref 0.52–1.04)
EOSINOPHIL # BLD AUTO: 0 10E3/UL (ref 0–0.7)
EOSINOPHIL NFR BLD AUTO: 0 %
ERYTHROCYTE [DISTWIDTH] IN BLOOD BY AUTOMATED COUNT: 15.4 % (ref 10–15)
FASTING STATUS PATIENT QL REPORTED: NORMAL
GFR SERPL CREATININE-BSD FRML MDRD: >90 ML/MIN/1.73M2
GLUCOSE BLD-MCNC: 106 MG/DL (ref 70–99)
HCT VFR BLD AUTO: 33.6 % (ref 35–47)
HGB BLD-MCNC: 11.1 G/DL (ref 11.7–15.7)
HOLD SPECIMEN: NORMAL
IMM GRANULOCYTES # BLD: 0 10E3/UL
IMM GRANULOCYTES NFR BLD: 0 %
LYMPHOCYTES # BLD AUTO: 0.7 10E3/UL (ref 0.8–5.3)
LYMPHOCYTES NFR BLD AUTO: 7 %
MAGNESIUM SERPL-MCNC: 1.9 MG/DL (ref 1.6–2.3)
MCH RBC QN AUTO: 27.1 PG (ref 26.5–33)
MCHC RBC AUTO-ENTMCNC: 33 G/DL (ref 31.5–36.5)
MCV RBC AUTO: 82 FL (ref 78–100)
MONOCYTES # BLD AUTO: 0.1 10E3/UL (ref 0–1.3)
MONOCYTES NFR BLD AUTO: 1 %
NEUTROPHILS # BLD AUTO: 9.8 10E3/UL (ref 1.6–8.3)
NEUTROPHILS NFR BLD AUTO: 92 %
NRBC # BLD AUTO: 0 10E3/UL
NRBC BLD AUTO-RTO: 0 /100
PHOSPHATE SERPL-MCNC: 2.9 MG/DL (ref 2.5–4.5)
PLATELET # BLD AUTO: 300 10E3/UL (ref 150–450)
POTASSIUM BLD-SCNC: 3.8 MMOL/L (ref 3.4–5.3)
PROT SERPL-MCNC: 7.3 G/DL (ref 6.8–8.8)
RBC # BLD AUTO: 4.09 10E6/UL (ref 3.8–5.2)
SODIUM SERPL-SCNC: 138 MMOL/L (ref 133–144)
TRIGL SERPL-MCNC: 48 MG/DL
WBC # BLD AUTO: 10.6 10E3/UL (ref 4–11)

## 2022-01-26 PROCEDURE — 80053 COMPREHEN METABOLIC PANEL: CPT | Performed by: SURGERY

## 2022-01-26 PROCEDURE — 84100 ASSAY OF PHOSPHORUS: CPT | Performed by: SURGERY

## 2022-01-26 PROCEDURE — 82248 BILIRUBIN DIRECT: CPT | Performed by: SURGERY

## 2022-01-26 PROCEDURE — 84478 ASSAY OF TRIGLYCERIDES: CPT | Performed by: SURGERY

## 2022-01-26 PROCEDURE — 83735 ASSAY OF MAGNESIUM: CPT | Performed by: SURGERY

## 2022-01-26 PROCEDURE — 85025 COMPLETE CBC W/AUTO DIFF WBC: CPT | Performed by: SURGERY

## 2022-01-27 ENCOUNTER — HOME INFUSION (PRE-WILLOW HOME INFUSION) (OUTPATIENT)
Dept: PHARMACY | Facility: CLINIC | Age: 42
End: 2022-01-27

## 2022-01-28 ENCOUNTER — HOME INFUSION (PRE-WILLOW HOME INFUSION) (OUTPATIENT)
Dept: PHARMACY | Facility: CLINIC | Age: 42
End: 2022-01-28

## 2022-01-31 ENCOUNTER — HOME INFUSION (PRE-WILLOW HOME INFUSION) (OUTPATIENT)
Dept: PHARMACY | Facility: CLINIC | Age: 42
End: 2022-01-31

## 2022-02-03 ENCOUNTER — MEDICAL CORRESPONDENCE (OUTPATIENT)
Dept: HEALTH INFORMATION MANAGEMENT | Facility: CLINIC | Age: 42
End: 2022-02-03
Payer: COMMERCIAL

## 2022-02-10 NOTE — PROGRESS NOTES
This is a recent snapshot of the patient's [facility] medical record.  For current drug dose and complete information and questions, call [phone] or In Basket pool, fv home infusion ([id number])  CSN Number:  [id number]

## 2022-03-10 NOTE — PROGRESS NOTES
This is a recent snapshot of the patient's [facility] medical record.  For current drug dose and complete information and questions, call [phone] or In Basket pool, fv home infusion ([id number])  CSN Number:  [id number]

## 2022-03-19 NOTE — TELEPHONE ENCOUNTER
Called patient to see if she was still coming today for staple removal    She apologized for not calling to report that she ended up in the ER recently and staples were removed at that time    Cass Mccray RN-BSN     6

## 2022-04-22 NOTE — PROGRESS NOTES
This is a recent snapshot of the patient's [facility] medical record.  For current drug dose and complete information and questions, call [phone] or In [facility] [place] pool, fv home infusion ([id number])  CSN Number:  [id number]

## 2022-04-25 NOTE — PROGRESS NOTES
This is a recent snapshot of the patient's [facility] medical record.  For current drug dose and complete information and questions, call [phone] or In Basket pool, fv home infusion ([id number])  CSN Number:  [id number]

## 2022-04-26 NOTE — PROGRESS NOTES
This is a recent snapshot of the patient's [facility] medical record.  For current drug dose and complete information and questions, call [phone] or In Basket pool, fv home infusion ([id number])  CSN Number:  [id number]

## 2022-04-27 NOTE — PROGRESS NOTES
This is a recent snapshot of the patient's [facility] medical record.  For current drug dose and complete information and questions, call [phone] or In Basket pool, fv home infusion ([id number])  CSN Number:  [id number]

## 2022-04-28 NOTE — PROGRESS NOTES
This is a recent snapshot of the patient's [facility] medical record.  For current drug dose and complete information and questions, call [phone] or In Basket pool, fv home infusion ([id number])  CSN Number:  [id number]

## 2022-04-29 NOTE — PROGRESS NOTES
This is a recent snapshot of the patient's [facility] medical record.  For current drug dose and complete information and questions, call [phone] or In [facility] pool, fv home infusion ([id number])  CSN Number:  [id number]

## 2022-05-03 NOTE — PROGRESS NOTES
This is a recent snapshot of the patient's [facility] medical record.  For current drug dose and complete information and questions, call [phone] or In Basket pool, fv home infusion ([id number])  CSN Number:  [id number]

## 2022-05-09 NOTE — PROGRESS NOTES
This is a recent snapshot of the patient's [facility] medical record.  For current drug dose and complete information and questions, call [phone] or In Basket pool, fv home infusion ([id number])  CSN Number:  [id number]

## 2022-05-10 NOTE — PROGRESS NOTES
This is a recent snapshot of the patient's [facility] medical record.  For current drug dose and complete information and questions, call [phone] or In Basket pool, fv home infusion ([id number])  CSN Number:  [id number]

## 2022-05-19 NOTE — PROGRESS NOTES
This is a recent snapshot of the patient's [facility] medical record.  For current drug dose and complete information and questions, call [phone] or In Basket pool, fv home infusion ([id number])  CSN Number:  [id number]

## 2022-06-16 NOTE — PROGRESS NOTES
This is a recent snapshot of the patient's [facility] medical record.  For current drug dose and complete information and questions, call [phone] or In Basket pool, fv home infusion ([id number])  CSN Number:  [id number]

## 2022-06-22 NOTE — PROGRESS NOTES
This is a recent snapshot of the patient's [facility] medical record.  For current drug dose and complete information and questions, call [phone] or In Basket pool, fv home infusion ([id number])  CSN Number:  [id number]

## 2022-06-28 NOTE — PROGRESS NOTES
This is a recent snapshot of the patient's [facility] medical record.  For current drug dose and complete information and questions, call [phone] or In Basket pool, fv home infusion ([id number])  CSN Number:  [id number]

## 2022-06-29 NOTE — PROGRESS NOTES
This is a recent snapshot of the patient's [facility] medical record.  For current drug dose and complete information and questions, call [phone] or In Basket pool, fv home infusion ([id number])  CSN Number:  [id number]

## 2022-07-13 NOTE — PROGRESS NOTES
This is a recent snapshot of the patient's [facility] medical record.  For current drug dose and complete information and questions, call [phone] or In Basket pool, fv home infusion ([id number])  CSN Number:  [id number]

## 2022-07-19 NOTE — PROGRESS NOTES
This is a recent snapshot of the patient's [facility] medical record.  For current drug dose and complete information and questions, call [phone] or In Basket pool, fv home infusion ([id number])  CSN Number:  [id number]

## 2022-07-20 NOTE — PROGRESS NOTES
This is a recent snapshot of the patient's [facility] medical record.  For current drug dose and complete information and questions, call [phone] or In Basket pool, fv home infusion ([id number])  CSN Number:  [id number]

## (undated) DEVICE — GLOVE BIOGEL PI ULTRATOUCH G SZ 6.5 42165

## (undated) DEVICE — BLADE KNIFE SURG 10 371110

## (undated) DEVICE — DRAPE LAP W/ARMBOARD 29410

## (undated) DEVICE — APPLICATOR ENDOSCOPIC 5 SURGICEL POWDER 3123SPEA

## (undated) DEVICE — SUCTION IRR STRYKERFLOW II W/TIP 250-070-520

## (undated) DEVICE — GLOVE PROTEXIS W/NEU-THERA 7.5  2D73TE75

## (undated) DEVICE — Device

## (undated) DEVICE — ENDO POUCH UNIVERSAL RETRIEVAL SYSTEM INZII 12/15MM CD004

## (undated) DEVICE — DAVINCI HOT SHEARS TIP COVER  400180

## (undated) DEVICE — ESU GROUND PAD UNIVERSAL W/O CORD

## (undated) DEVICE — CATH INTERMITTENT CLEAN-CATH FEMALE 14FR 6" VINYL LF 420614

## (undated) DEVICE — SU VICRYL 0 CT-1 27" UND J260H

## (undated) DEVICE — SU VICRYL 3-0 SH 27" J316H

## (undated) DEVICE — SU VICRYL 0 UR-6 27" J603H

## (undated) DEVICE — SYR 50ML CATH TIP W/O NDL 309620

## (undated) DEVICE — STPL SKIN 35W ROTATING HEAD PRW35

## (undated) DEVICE — SPONGE LAP 18X18" X8435

## (undated) DEVICE — ENDO TROCAR FIRST ENTRY KII FIOS ADV FIX 11X100MM CFF33

## (undated) DEVICE — SU VICRYL 3-0 TIE 12X18" J904T

## (undated) DEVICE — PREP CHLORAPREP 26ML TINTED ORANGE  260815

## (undated) DEVICE — DAVINCI XI DRAPE ARM 470015

## (undated) DEVICE — SUCTION CANISTER MEDIVAC LINER 3000ML W/LID 65651-530

## (undated) DEVICE — LINEN TOWEL PACK X5 5464

## (undated) DEVICE — SU VICRYL 0 CT-2 27" J334H

## (undated) DEVICE — SU SILK 2-0 SH CR 8X18" C012D

## (undated) DEVICE — STPL RELOAD LINEAR CUT 100X3.8MM TCR10

## (undated) DEVICE — SU VICRYL 3-0 SH CR 8X18" J774

## (undated) DEVICE — DRAPE CV SPLIT II 147X106" 9158

## (undated) DEVICE — SOL NACL 0.9% INJ 1000ML BAG 2B1324X

## (undated) DEVICE — PACK DAVINCI GYN SMA15GDFS1

## (undated) DEVICE — ENDO SCOPE WARMER LF TM500

## (undated) DEVICE — ESU LIGASURE IMPACT OPEN SEALER/DVDR CVD LG JAW 36MM LF4318

## (undated) DEVICE — COVER CLAMP FABRIC RADIOPAQUE 9.5X150MM 072002PBX

## (undated) DEVICE — ESU ELEC BLADE 6" COATED E1450-6

## (undated) DEVICE — PACK MAJOR SBA15MAFSI

## (undated) DEVICE — SU PDS II 0 CT-2 27" Z334H

## (undated) DEVICE — GLOVE PROTEXIS MICRO 6.5  2D73PM65

## (undated) DEVICE — SU MONOCRYL 4-0 PS-2 18" UND Y496G

## (undated) DEVICE — TUBE GASTROSTOMY MIC ENFIT 18FR 8100-18

## (undated) DEVICE — DRSG GAUZE 4X4" 3033

## (undated) DEVICE — SU PDS II 1 TP-1 48" Z880G

## (undated) DEVICE — STPL LINEAR CUT 100MM TLC10

## (undated) DEVICE — NDL 22GA 1.5"

## (undated) DEVICE — SOL WATER IRRIG 1000ML BOTTLE 2F7114

## (undated) DEVICE — DRAIN JACKSON PRATT 10FR ROUND SU130-1321

## (undated) DEVICE — SU PROLENE 2-0 SHDA 48" 8533H

## (undated) DEVICE — DAVINCI XI OBTURATOR BLADELESS 8MM 470359

## (undated) DEVICE — BARRIER SEPRAFILM 5X6" SINGLE SHEET 4301-02

## (undated) DEVICE — DAVINCI XI DRAPE COLUMN 470341

## (undated) DEVICE — DRAIN JACKSON PRATT RESERVOIR 100ML SU130-1305

## (undated) DEVICE — VESSEL LOOPS RED MAXI

## (undated) DEVICE — SPONGE KITTNER 31001010

## (undated) DEVICE — BLADE KNIFE SURG 15 371115

## (undated) DEVICE — SOL BENZOIN 0.5OZ

## (undated) DEVICE — NDL INSUFFLATION 13GA 120MM C2201

## (undated) DEVICE — DRAPE IOBAN INCISE 23X17" 6650EZ

## (undated) DEVICE — DRAPE LEGGINGS 8421

## (undated) DEVICE — SU ETHIBOND 1 CT-1 30" X425H

## (undated) DEVICE — ENDO TROCAR FIRST ENTRY KII FIOS Z-THRD 12X100MM CTF73

## (undated) DEVICE — SU VICRYL 2-0 TIE 12X18" J905T

## (undated) DEVICE — ENDO TROCAR SLEEVE KII ADV FIXATION 05X100MM CFS02

## (undated) DEVICE — SURGICEL POWDER ABSORBABLE HEMOSTAT 3GM 3013SP

## (undated) DEVICE — GLOVE PROTEXIS BLUE W/NEU-THERA 7.5  2D73EB75

## (undated) DEVICE — DECANTER BAG 2002S

## (undated) DEVICE — GLOVE PROTEXIS BLUE W/NEU-THERA 6.0  2D73EB60

## (undated) DEVICE — DRSG STERI STRIP 1X5" R1548

## (undated) DEVICE — SU PDS II 2-0 CT-2 27"  Z333H

## (undated) DEVICE — ENDO TROCAR FIRST ENTRY KII FIOS ADV FIX 05X100MM CFF03

## (undated) DEVICE — SOL NACL 0.9% IRRIG 1000ML BOTTLE 2F7124

## (undated) DEVICE — STPL SKIN 35W 6.9MM  PXW35

## (undated) DEVICE — SPONGE RAY-TEC 4X4" 7317

## (undated) DEVICE — DAVINCI XI SEAL UNIVERSAL 5-8MM 470361

## (undated) DEVICE — ESU HOLDER LAP INST DISP PURPLE LONG 330MM H-PRO-330

## (undated) DEVICE — KIT PATIENT POSITIONING PIGAZZI LATEX FREE 40580

## (undated) DEVICE — PREP DURAPREP 26ML APL 8630

## (undated) DEVICE — ESU LIGASURE IMPACT OPEN SEALER/DVDR CVD LG JAW LF4418

## (undated) DEVICE — DRSG TELFA ISLAND 4X10"

## (undated) DEVICE — ESU LIGASURE LAPAROSCOPIC BLUNT TIP SEALER 5MMX37CM LF1837

## (undated) DEVICE — CATH TRAY FOLEY SURESTEP 16FR WDRAIN BAG STLK LATEX A300316A

## (undated) DEVICE — GLOVE PROTEXIS BLUE W/NEU-THERA 8.0  2D73EB80

## (undated) DEVICE — DRAIN JACKSON PRATT 15FR ROUND SIL LF JP-2229

## (undated) DEVICE — TUBING CONMED AIRSEAL SMOKE EVAC INSUFFLATION ASM-EVAC

## (undated) RX ORDER — HYDROMORPHONE HYDROCHLORIDE 1 MG/ML
INJECTION, SOLUTION INTRAMUSCULAR; INTRAVENOUS; SUBCUTANEOUS
Status: DISPENSED
Start: 2020-07-27

## (undated) RX ORDER — DEXAMETHASONE SODIUM PHOSPHATE 4 MG/ML
INJECTION, SOLUTION INTRA-ARTICULAR; INTRALESIONAL; INTRAMUSCULAR; INTRAVENOUS; SOFT TISSUE
Status: DISPENSED
Start: 2020-07-27

## (undated) RX ORDER — NALOXONE HYDROCHLORIDE 0.4 MG/ML
INJECTION, SOLUTION INTRAMUSCULAR; INTRAVENOUS; SUBCUTANEOUS
Status: DISPENSED
Start: 2021-12-13

## (undated) RX ORDER — FENTANYL CITRATE 50 UG/ML
INJECTION, SOLUTION INTRAMUSCULAR; INTRAVENOUS
Status: DISPENSED
Start: 2022-01-18

## (undated) RX ORDER — FENTANYL CITRATE 50 UG/ML
INJECTION, SOLUTION INTRAMUSCULAR; INTRAVENOUS
Status: DISPENSED
Start: 2020-06-12

## (undated) RX ORDER — KETOROLAC TROMETHAMINE 30 MG/ML
INJECTION, SOLUTION INTRAMUSCULAR; INTRAVENOUS
Status: DISPENSED
Start: 2020-07-27

## (undated) RX ORDER — NEOSTIGMINE METHYLSULFATE 1 MG/ML
VIAL (ML) INJECTION
Status: DISPENSED
Start: 2020-06-12

## (undated) RX ORDER — LIDOCAINE HYDROCHLORIDE 10 MG/ML
INJECTION, SOLUTION INFILTRATION; PERINEURAL
Status: DISPENSED
Start: 2022-01-18

## (undated) RX ORDER — GLYCOPYRROLATE 0.2 MG/ML
INJECTION, SOLUTION INTRAMUSCULAR; INTRAVENOUS
Status: DISPENSED
Start: 2020-07-27

## (undated) RX ORDER — FENTANYL CITRATE 50 UG/ML
INJECTION, SOLUTION INTRAMUSCULAR; INTRAVENOUS
Status: DISPENSED
Start: 2020-09-02

## (undated) RX ORDER — GLYCOPYRROLATE 0.2 MG/ML
INJECTION, SOLUTION INTRAMUSCULAR; INTRAVENOUS
Status: DISPENSED
Start: 2021-12-13

## (undated) RX ORDER — FLUMAZENIL 0.1 MG/ML
INJECTION, SOLUTION INTRAVENOUS
Status: DISPENSED
Start: 2020-08-01

## (undated) RX ORDER — GLYCOPYRROLATE 0.2 MG/ML
INJECTION, SOLUTION INTRAMUSCULAR; INTRAVENOUS
Status: DISPENSED
Start: 2020-06-12

## (undated) RX ORDER — CEFAZOLIN SODIUM 2 G/100ML
INJECTION, SOLUTION INTRAVENOUS
Status: DISPENSED
Start: 2020-09-02

## (undated) RX ORDER — HYDROMORPHONE HYDROCHLORIDE 1 MG/ML
INJECTION, SOLUTION INTRAMUSCULAR; INTRAVENOUS; SUBCUTANEOUS
Status: DISPENSED
Start: 2020-06-12

## (undated) RX ORDER — NALOXONE HYDROCHLORIDE 0.4 MG/ML
INJECTION, SOLUTION INTRAMUSCULAR; INTRAVENOUS; SUBCUTANEOUS
Status: DISPENSED
Start: 2020-08-01

## (undated) RX ORDER — ACETAMINOPHEN 325 MG/1
TABLET ORAL
Status: DISPENSED
Start: 2020-07-27

## (undated) RX ORDER — ONDANSETRON 2 MG/ML
INJECTION INTRAMUSCULAR; INTRAVENOUS
Status: DISPENSED
Start: 2020-09-02

## (undated) RX ORDER — PROPOFOL 10 MG/ML
INJECTION, EMULSION INTRAVENOUS
Status: DISPENSED
Start: 2020-07-27

## (undated) RX ORDER — LIDOCAINE HYDROCHLORIDE 20 MG/ML
INJECTION, SOLUTION EPIDURAL; INFILTRATION; INTRACAUDAL; PERINEURAL
Status: DISPENSED
Start: 2020-07-27

## (undated) RX ORDER — FENTANYL CITRATE 50 UG/ML
INJECTION, SOLUTION INTRAMUSCULAR; INTRAVENOUS
Status: DISPENSED
Start: 2020-08-01

## (undated) RX ORDER — CEFAZOLIN SODIUM 1 G/3ML
INJECTION, POWDER, FOR SOLUTION INTRAMUSCULAR; INTRAVENOUS
Status: DISPENSED
Start: 2020-07-27

## (undated) RX ORDER — MEPERIDINE HYDROCHLORIDE 25 MG/ML
INJECTION INTRAMUSCULAR; INTRAVENOUS; SUBCUTANEOUS
Status: DISPENSED
Start: 2021-12-13

## (undated) RX ORDER — HEPARIN SODIUM (PORCINE) LOCK FLUSH IV SOLN 100 UNIT/ML 100 UNIT/ML
SOLUTION INTRAVENOUS
Status: DISPENSED
Start: 2020-09-02

## (undated) RX ORDER — CEFAZOLIN SODIUM 2 G/100ML
INJECTION, SOLUTION INTRAVENOUS
Status: DISPENSED
Start: 2020-07-27

## (undated) RX ORDER — FENTANYL CITRATE 50 UG/ML
INJECTION, SOLUTION INTRAMUSCULAR; INTRAVENOUS
Status: DISPENSED
Start: 2020-07-27

## (undated) RX ORDER — DEXAMETHASONE SODIUM PHOSPHATE 4 MG/ML
INJECTION, SOLUTION INTRA-ARTICULAR; INTRALESIONAL; INTRAMUSCULAR; INTRAVENOUS; SOFT TISSUE
Status: DISPENSED
Start: 2020-06-12

## (undated) RX ORDER — LIDOCAINE HYDROCHLORIDE 20 MG/ML
INJECTION, SOLUTION EPIDURAL; INFILTRATION; INTRACAUDAL; PERINEURAL
Status: DISPENSED
Start: 2021-12-12

## (undated) RX ORDER — BUPIVACAINE HYDROCHLORIDE AND EPINEPHRINE 2.5; 5 MG/ML; UG/ML
INJECTION, SOLUTION EPIDURAL; INFILTRATION; INTRACAUDAL; PERINEURAL
Status: DISPENSED
Start: 2020-06-12

## (undated) RX ORDER — ONDANSETRON 2 MG/ML
INJECTION INTRAMUSCULAR; INTRAVENOUS
Status: DISPENSED
Start: 2020-06-12

## (undated) RX ORDER — CEFAZOLIN SODIUM 2 G/100ML
INJECTION, SOLUTION INTRAVENOUS
Status: DISPENSED
Start: 2021-12-13

## (undated) RX ORDER — NALOXONE HYDROCHLORIDE 0.4 MG/ML
INJECTION, SOLUTION INTRAMUSCULAR; INTRAVENOUS; SUBCUTANEOUS
Status: DISPENSED
Start: 2020-06-12

## (undated) RX ORDER — FENTANYL CITRATE 50 UG/ML
INJECTION, SOLUTION INTRAMUSCULAR; INTRAVENOUS
Status: DISPENSED
Start: 2021-12-13

## (undated) RX ORDER — DEXAMETHASONE SODIUM PHOSPHATE 4 MG/ML
INJECTION, SOLUTION INTRA-ARTICULAR; INTRALESIONAL; INTRAMUSCULAR; INTRAVENOUS; SOFT TISSUE
Status: DISPENSED
Start: 2021-12-12

## (undated) RX ORDER — FENTANYL CITRATE 50 UG/ML
INJECTION, SOLUTION INTRAMUSCULAR; INTRAVENOUS
Status: DISPENSED
Start: 2021-12-12

## (undated) RX ORDER — NEOSTIGMINE METHYLSULFATE 1 MG/ML
VIAL (ML) INJECTION
Status: DISPENSED
Start: 2020-07-27

## (undated) RX ORDER — ONDANSETRON 2 MG/ML
INJECTION INTRAMUSCULAR; INTRAVENOUS
Status: DISPENSED
Start: 2021-12-12

## (undated) RX ORDER — PROPOFOL 10 MG/ML
INJECTION, EMULSION INTRAVENOUS
Status: DISPENSED
Start: 2020-06-12

## (undated) RX ORDER — LIDOCAINE HYDROCHLORIDE 10 MG/ML
INJECTION, SOLUTION INFILTRATION; PERINEURAL
Status: DISPENSED
Start: 2020-09-02

## (undated) RX ORDER — BUPIVACAINE HYDROCHLORIDE AND EPINEPHRINE 5; 5 MG/ML; UG/ML
INJECTION, SOLUTION EPIDURAL; INTRACAUDAL; PERINEURAL
Status: DISPENSED
Start: 2020-07-27

## (undated) RX ORDER — NEOSTIGMINE METHYLSULFATE 1 MG/ML
VIAL (ML) INJECTION
Status: DISPENSED
Start: 2021-12-13

## (undated) RX ORDER — ALBUMIN, HUMAN INJ 5% 5 %
SOLUTION INTRAVENOUS
Status: DISPENSED
Start: 2020-07-27

## (undated) RX ORDER — LIDOCAINE HYDROCHLORIDE 20 MG/ML
INJECTION, SOLUTION EPIDURAL; INFILTRATION; INTRACAUDAL; PERINEURAL
Status: DISPENSED
Start: 2020-06-12

## (undated) RX ORDER — PROPOFOL 10 MG/ML
INJECTION, EMULSION INTRAVENOUS
Status: DISPENSED
Start: 2021-12-12